# Patient Record
Sex: FEMALE | Race: WHITE | NOT HISPANIC OR LATINO | Employment: OTHER | URBAN - METROPOLITAN AREA
[De-identification: names, ages, dates, MRNs, and addresses within clinical notes are randomized per-mention and may not be internally consistent; named-entity substitution may affect disease eponyms.]

---

## 2017-05-22 ENCOUNTER — GENERIC CONVERSION - ENCOUNTER (OUTPATIENT)
Dept: OTHER | Facility: OTHER | Age: 67
End: 2017-05-22

## 2017-07-06 ENCOUNTER — ALLSCRIPTS OFFICE VISIT (OUTPATIENT)
Dept: OTHER | Facility: OTHER | Age: 67
End: 2017-07-06

## 2017-07-06 ENCOUNTER — TRANSCRIBE ORDERS (OUTPATIENT)
Dept: ADMINISTRATIVE | Facility: HOSPITAL | Age: 67
End: 2017-07-06

## 2017-07-06 DIAGNOSIS — R41.3 OTHER AMNESIA: ICD-10-CM

## 2017-07-06 DIAGNOSIS — R42 DIZZINESS AND GIDDINESS: ICD-10-CM

## 2017-07-06 DIAGNOSIS — I67.1 CEREBRAL ANEURYSM, NONRUPTURED: ICD-10-CM

## 2017-07-06 DIAGNOSIS — R42 DIZZINESS AND GIDDINESS: Primary | ICD-10-CM

## 2017-07-06 DIAGNOSIS — M54.2 CERVICALGIA: ICD-10-CM

## 2017-07-06 DIAGNOSIS — G43.019 COMMON MIGRAINE WITH INTRACTABLE MIGRAINE: Primary | ICD-10-CM

## 2017-07-06 DIAGNOSIS — R41.3 MEMORY LOSS: ICD-10-CM

## 2017-07-06 DIAGNOSIS — E55.9 VITAMIN D DEFICIENCY: ICD-10-CM

## 2017-07-06 DIAGNOSIS — G43.019 INTRACTABLE MIGRAINE WITHOUT AURA AND WITHOUT STATUS MIGRAINOSUS: ICD-10-CM

## 2017-07-11 ENCOUNTER — LAB CONVERSION - ENCOUNTER (OUTPATIENT)
Dept: OTHER | Facility: OTHER | Age: 67
End: 2017-07-11

## 2017-07-11 LAB
BUN SERPL-MCNC: 21 MG/DL (ref 7–25)
CREAT SERPL-MCNC: 0.82 MG/DL (ref 0.5–0.99)
EGFR AFRICAN AMERICAN (HISTORICAL): 88 ML/MIN/1.73M2
EGFR-AMERICAN CALC (HISTORICAL): 76 ML/MIN/1.73M2

## 2017-07-12 ENCOUNTER — LAB CONVERSION - ENCOUNTER (OUTPATIENT)
Dept: OTHER | Facility: OTHER | Age: 67
End: 2017-07-12

## 2017-07-12 LAB
25(OH)D3 SERPL-MCNC: 31 NG/ML (ref 30–100)
BUN SERPL-MCNC: 21 MG/DL (ref 7–25)
CREAT SERPL-MCNC: 0.82 MG/DL (ref 0.5–0.99)
EGFR AFRICAN AMERICAN (HISTORICAL): 88 ML/MIN/1.73M2
EGFR-AMERICAN CALC (HISTORICAL): 76 ML/MIN/1.73M2
HOMOCYST(E)INE, P/S (HISTORICAL): 10.4 UMOL/L
TSH SERPL DL<=0.05 MIU/L-ACNC: 3.18 MIU/L (ref 0.4–4.5)
VIT B12 SERPL-MCNC: 412 PG/ML (ref 200–1100)

## 2017-07-20 ENCOUNTER — HOSPITAL ENCOUNTER (OUTPATIENT)
Dept: MRI IMAGING | Facility: HOSPITAL | Age: 67
Discharge: HOME | End: 2017-07-20
Attending: PSYCHIATRY & NEUROLOGY
Payer: COMMERCIAL

## 2017-07-20 DIAGNOSIS — R41.3 OTHER AMNESIA: ICD-10-CM

## 2017-07-20 DIAGNOSIS — G43.019 INTRACTABLE MIGRAINE WITHOUT AURA AND WITHOUT STATUS MIGRAINOSUS: ICD-10-CM

## 2017-07-20 DIAGNOSIS — R42 DIZZINESS AND GIDDINESS: ICD-10-CM

## 2017-07-20 PROCEDURE — 70553 MRI BRAIN STEM W/O & W/DYE: CPT

## 2017-07-20 PROCEDURE — A9585 GADOBUTROL INJECTION: HCPCS | Performed by: PSYCHIATRY & NEUROLOGY

## 2017-07-20 RX ADMIN — GADOBUTROL 6 ML: 604.72 INJECTION INTRAVENOUS at 10:52

## 2017-07-31 ENCOUNTER — HOSPITAL ENCOUNTER (OUTPATIENT)
Dept: NEUROLOGY | Facility: AMBULATORY SURGERY CENTER | Age: 67
Discharge: HOME | End: 2017-07-31
Payer: COMMERCIAL

## 2017-07-31 DIAGNOSIS — R42 DIZZINESS AND GIDDINESS: ICD-10-CM

## 2017-07-31 PROCEDURE — 95816 EEG AWAKE AND DROWSY: CPT

## 2017-08-07 ENCOUNTER — HOSPITAL ENCOUNTER (OUTPATIENT)
Dept: CT IMAGING | Facility: HOSPITAL | Age: 67
Discharge: HOME | End: 2017-08-07
Attending: PSYCHIATRY & NEUROLOGY
Payer: COMMERCIAL

## 2017-08-07 DIAGNOSIS — I67.1 NONRUPTURED CEREBRAL ANEURYSM: ICD-10-CM

## 2017-08-07 PROCEDURE — 70496 CT ANGIOGRAPHY HEAD: CPT

## 2017-08-07 RX ADMIN — IODIXANOL 85 ML: 320 INJECTION, SOLUTION INTRAVASCULAR at 13:07

## 2017-09-26 ENCOUNTER — APPOINTMENT (OUTPATIENT)
Dept: PHYSICAL THERAPY | Facility: CLINIC | Age: 67
End: 2017-09-26
Payer: COMMERCIAL

## 2017-09-26 PROCEDURE — G8984 CARRY CURRENT STATUS: HCPCS

## 2017-09-26 PROCEDURE — 97110 THERAPEUTIC EXERCISES: CPT

## 2017-09-26 PROCEDURE — G8985 CARRY GOAL STATUS: HCPCS

## 2017-09-26 PROCEDURE — 97161 PT EVAL LOW COMPLEX 20 MIN: CPT

## 2017-09-28 ENCOUNTER — APPOINTMENT (OUTPATIENT)
Dept: PHYSICAL THERAPY | Facility: CLINIC | Age: 67
End: 2017-09-28
Payer: COMMERCIAL

## 2017-10-12 ENCOUNTER — TRANSCRIBE ORDERS (OUTPATIENT)
Dept: ADMINISTRATIVE | Facility: HOSPITAL | Age: 67
End: 2017-10-12

## 2017-10-12 ENCOUNTER — ALLSCRIPTS OFFICE VISIT (OUTPATIENT)
Dept: OTHER | Facility: OTHER | Age: 67
End: 2017-10-12

## 2017-10-12 DIAGNOSIS — M54.2 CERVICALGIA: ICD-10-CM

## 2017-10-12 DIAGNOSIS — M54.50 LOW BACK PAIN: ICD-10-CM

## 2017-10-12 DIAGNOSIS — M54.5 LOW BACK PAIN, UNSPECIFIED BACK PAIN LATERALITY, UNSPECIFIED CHRONICITY, WITH SCIATICA PRESENCE UNSPECIFIED: Primary | ICD-10-CM

## 2017-10-26 ENCOUNTER — HOSPITAL ENCOUNTER (OUTPATIENT)
Dept: MRI IMAGING | Facility: HOSPITAL | Age: 67
Discharge: HOME | End: 2017-10-26
Attending: PSYCHIATRY & NEUROLOGY
Payer: COMMERCIAL

## 2017-10-26 DIAGNOSIS — M54.2 CERVICALGIA: ICD-10-CM

## 2017-10-26 DIAGNOSIS — M54.5 LOW BACK PAIN, UNSPECIFIED BACK PAIN LATERALITY, UNSPECIFIED CHRONICITY, WITH SCIATICA PRESENCE UNSPECIFIED: ICD-10-CM

## 2017-10-26 PROCEDURE — 72148 MRI LUMBAR SPINE W/O DYE: CPT

## 2017-10-26 PROCEDURE — 72141 MRI NECK SPINE W/O DYE: CPT

## 2017-10-29 NOTE — PROGRESS NOTES
Assessment    1  Common migraine with intractable migraine (346 11) (G43 019)   2  Cervicalgia (723 1) (M54 2)   3  Lumbago (724 2) (M54 5)    Plan  Cervicalgia    · Divalproex Sodium  MG Oral Tablet Extended Release 24 Hour; TAKE 1TABLET AT BEDTIME   Rx By: Sabiha Akers; Dispense: 30 Days ; #:30 Tablet Extended Release 24 Hour; Refill: 1;For: Cervicalgia; MOSES = N; Verified Transmission to [facility] #[id number] Last Updated By: System, SureScripts; 10/12/2017 11:48:17 AM   · TiZANidine HCl - 2 MG Oral Tablet; Take 1 tablet at bedtime daily   Rx By: Sabiha Akers; Dispense: 30 Days ; #:30 Tablet; Refill: 1;Cervicalgia; MOSES = N; Verified Transmission to [facility]ARM[facility]Y #[id number] Last Updated By: System, SureScripts; 10/12/2017 11:48:18 AM   · * MRI CERVICAL SPINE WO CONTRAST; Status:Need Information - FinancialAuthorization; Requested for:12Oct2017;    Perform:[facility] Radiology; Order Comments:has completed pt with no benefit; Due:12Oct2018; Last Updated By:Leia Lema; 10/12/2017 12:04:42 PM;Ordered;Ordered By:Malik, Gerome Moritz;  Cervicalgia, Common migraine with intractable migraine    · Follow-up visit in 5 weeks Evaluation and Treatment  Follow-up with [facility] or blaze Status: Complete  Done: [id number]   Ordered;Cervicalgia, Common migraine with intractable migraine; Ordered By: Sabiha Akers Performed:  Due: 12Oct2018; Last Updated By: Angelyn Severs; 10/12/2017 12:10:17 PM  Common migraine with intractable migraine    · Rizatriptan Benzoate 5 MG Oral Tablet; TAKE 1 TABLET AT ONSET OF HEADACHE  MAY REPEAT EVERY 2 HOURS AS NEEDED  MAXIMUM 3 TABLETS IN 24 HOURS   Rx By: Sabiha Akers; Dispense: 0 Days ; #:9 Tablet; Refill: 1;Common migraine with intractable migraine; MOSES = N; Verified Transmission to [facility]/PHARM[facility]Y #[id number] Last Updated By: System, SureScripts; 10/12/2017 11:48:17 AM  Lumbago    · * MRI LUMBAR SPINE WO CONTRAST; Status:Need Information - FinancialAuthorization;  Requested for:12Oct2017;    Perform:[facility] Radiology; [id number]; Last Updated By:Leia Lema; 10/12/2017 12:09:05 PM;Ordered;Ordered By:Marquita Aguilar;    Discussion/Summary  Discussion Summary:   Preventive: Will have her take Depakote Er 250 mg at bedtime daily for her migraine headaches  consider increasing if needed  For her neck pain she is to take tizanidine 2mg at bedtime as needed and continue her neck exercises  onset she is to take Maxalt 5mg and if that fails then Compazine  is also to take b12 901GSV pre day and Folic acid 233OUE per day  get MRI of neck and lumbar region due to pain and diffuse hyperreflexia  Chief Complaint  Chief Complaint Free Text Note Form: Patient present for follow up for headaches      History of Present Illness  HPI: We had the pleasure of evaluating Sage Desir in neurological follow up today  As you know she is a 61year old right handed female  She is a  and retired in Dec of 2016  She is here today for evaluation of her headaches  She did go the PT and is doing exercises on her own which do not help  She did try the muscle relaxation and that did not help either  Continues to have neck pain and has decrease range of motion to the left  She has noted that when her back pain is severe, she will have more neck pain with this  She states she has not had this for a while but states it is more so when she gets up in the morning  She is able to function with this but it will last a day to half a day  Her main concern is that lately when she was standing, she gets this feeling that she is going to pass out  She has noted she will have tachycardia with this  Will last for seconds and no other symptoms related to this  She is getting this once a week or once every other week now      headaches: medications do you take or have you taken for your headaches?  Inderalfiorinal, Excedrin, Advil, Aspirin  Headache trigger: Fatigue, Stress/Tension, Weather change, red winenone  often do the headaches occur ? one severe migraine in july she took Imitrex and it helped but caused lethargy and palpitation  time of the day do the headaches start ? varieslong do the headaches last - it can last for 48 hoursare they located ? temporal and at time entire one side of her headis the intensity of pain ? 8-9/10your usual headache - Throbbing, pressure, Stabbingsymptoms: Decrease of appetite, nauseaPhotophobia, phonophobia, sensitivity to smell Problem with concentrationstiff or sore neck, prefer to be alone and in a dark room, unable to work      reviewed      Review of Systems  Neurological ROS:  Constitutional: recent weight loss-- and-- fatigue  HEENT: dryness of the eyes,-- hearing loss-- and-- tinnitus  Cardiovascular: palpitations present   Respiratory:  no unusual or persistant cough, no shortness of breath with or without exertion  Gastrointestinal: changes in bowel habits  Genitourinary:  no incontinence, no feelings of urinary urgency, no increase in frequency, no urinary hesitancy, no dysuria, no hematuria  Musculoskeletal: head/neck/back pain  Integumentary  no masses, no rash, no skin lesions, no livedo reticularis  Psychiatric: anxiety  Endocrine  no unusual weight loss or gain, no excessive urination, no excessive thirst, no hair loss or gain, no hot or cold intolerance, no menstrual period change or irregularity, no loss of sexual ability or drive, no erection difficulty, no nipple discharge  Hematologic/Lymphatic:  no unusual bleeding, no tendency for easy bruising, no clotting skin or lumps  Neurological General: headache-- and-- lightheadedness  Neurological Mental Status:  no confusion, no mood swings, no alteration or loss of consciousness, no difficulty expressing/understanding speech, no memory problems  Neurological Cranial Nerves: vertigo or dizziness  Neurological Motor findings include:  no tremor, no twitching, no cramping(pre/post exercise), no atrophy    Neurological Coordination:  no unsteadiness, no vertigo or dizziness, no clumsiness, no problems reaching for objects  Neurological Sensory:  no numbness, no pain, no tingling, does not fall when eyes closed or taking a shower  Neurological Gait:  no difficulty walking, not falling to one side, no sensation of being pushed, has not had falls  ROS Reviewed:   ROS reviewed  Active Problems    1  Acute right-sided low back pain without sciatica (724 2) (M54 5)   2  Aneurysm, cerebral (437 3) (I67 1)   3  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)   4  Cervicalgia (723 1) (M54 2)   5  Common migraine with intractable migraine (346 11) (G43 019)   6  Decreased visual acuity (369 9) (H54 7)   7  Dermatitis (692 9) (L30 9)   8  Dizzinesses (780 4) (R42)   9  Encounter for screening colonoscopy (V76 51) (Z12 11)   10  Encounter for screening mammogram for malignant neoplasm of breast (V76 12)  (Z12 31)   11  Hearing Loss (389 9)   12  Herniated Disc (T8 - T9) Bilateral (722 11)   13  History of allergy (V15 09) (Z88 9)   14  Hyperlipidemia (272 4) (E78 5)   15  Irritable bowel syndrome (564 1) (K58 9)   16  Left knee pain (719 46) (M25 562)   17  Lightheadedness (780 4) (R42)   18  Memory difficulties (780 93) (R41 3)   19  Migraine (346 90) (G43 909)   20  Onychomycosis (110 1) (B35 1)   21  Osteoarthritis of both hands, unspecified osteoarthritis type (715 94) (M19 041,M19 042)   22  Screening for cervical cancer (V76 2) (Z12 4)   23  Skin lesions (709 9) (L98 9)   24  URI, acute (465 9) (J06 9)   25  Well adult on routine health check (V70 0) (Z00 00)    Past Medical History  1  History of Abdominal cramping (789 00) (R10 9)   2  History of Acute bacterial conjunctivitis (372 03) (H10 30)   3  History of Carpal tunnel syndrome, unspecified laterality (354 0) (G56 00)   4  History of Cough (786 2) (R05)   5  Flu vaccine need (V04 81) (Z23)   6  History of acute bronchitis (V12 69) (Z87 09)   7   History of acute sinusitis (V12 69) (Z87 09)   8  History of chest pain (V13 89) (Z87 898)   9  History of hysterectomy (V88 01) (Z90 710)   10  History of upper respiratory infection (V12 09) (Z87 09)   11  History of Joint pain, knee (719 46) (M25 569)   12  History of Leg swelling (729 81) (M79 89)   13  History of Palpitations (785 1) (R00 2)   14  History of Rectal hemorrhage (569 3) (K62 5)   15  History of Sore throat (462) (J02 9)    Surgical History  1  History of Appendectomy   2  History of Complete Colonoscopy   3  History of Total Abdominal Hysterectomy With Removal Of Both Ovaries    Family History  Mother    1  Family history of Heart Disease (V17 49)  Father    2  Family history of Lung Cancer (V16 1)  Sibling    3  Family history of multiple sclerosis (V17 2) (Z82 0)    Social History     · Alcohol Use (History)   · Caffeine use (V49 89) (F15 90)   · Former smoker (D39 25) (T27 915)   ·    · Retired   · Three children    Current Meds   1  Atorvastatin Calcium 40 MG Oral Tablet; TAKE 1 TABLET DAILY; Therapy: 44PAY5736 to (455 14 549)  Requested for: 23PKY5556; Last Rx:65Vin7303 Ordered   2  Butalbital-Aspirin-Caffeine -40 MG Oral Capsule; TAKE 1 CAPSULE Daily PRN migraine; Therapy: 79WLM0571 to (Evaluate:97Hfs1814)  Requested for: 31RDS9066; Last Rx:39Zdy5067 Ordered   3  LORazepam 0 5 MG Oral Tablet; 1 tablet po 1 hour prior to MRI and 1 tablet at time of MRI if needed; Therapy: 94WNR0199 to (Last Rx:30Ljf7085) Ordered   4  Prochlorperazine Maleate 10 MG Oral Tablet; 1 PO Q 6H PRN NAUSEA/HEADACHE (MAX 3 DAYS/WK); Therapy: 48VBW1280 to (Last Rx:32Hrm5067)  Requested for: 04IHG4647 Ordered   5  Propranolol HCl  MG Oral Capsule Extended Release 24 Hour; TAKE 1 CAPSULE EVERY DAY; Therapy: 05HLN4138 to (01 84 17 61 18)  Requested for: 16YMI4428; Last Rx:70Amf5863 Ordered   6  SUMAtriptan Succinate 100 MG Oral Tablet; TAKE 1 TABLET AT ONSET OF MIGRAINE HEADACHE    MAY REPEAT IN 2 HOURS IF NEEDED; Therapy: 47YZY6165 to (Last Rx:76Qyy4619)  Requested for: 98UUU5146 Ordered    Allergies    1  No Known Drug Allergies    Vitals  Signs   Recorded: 87WBF4051 11:17AM   Heart Rate: 65  Systolic: 239  Diastolic: 66  Height: 5 ft 7 in  Weight: 147 lb 8 oz  BMI Calculated: 23 1  BSA Calculated: 1 78    Physical Exam   Constitutional  General appearance: No acute distress, well appearing and well nourished  Eyes  Ophthalmoscopic examination: Vision is grossly normal  Gross visual field testing by confrontation shows no abnormalities  EOMI in both eyes  Conjunctivae clear  Eyelids normal palpebral fissures equal  Orbits exhibit normal position  No discharge from the eyes  PERRL  Musculoskeletal  Gait and station: Normal gait, stance and balance  Muscle strength: Normal strength throughout  Muscle tone: No atrophy, abnormal movements, flaccidity, cogwheeling or spasticity  Neurologic  Orientation to person, place, and time: Normal    2nd cranial nerve: Normal    3rd, 4th, and 6th cranial nerves: Normal    5th cranial nerve: Normal    7th cranial nerve: Normal    8th cranial nerve: Normal    9th cranial nerve: Normal    11th cranial nerve: Normal    12th cranial nerve: Normal    Sensation: Normal    Reflexes: Abnormal   Deep tendon reflexes: 3+ right biceps,-- 3+ left biceps,-- 3+ right triceps,-- 3+ left triceps,-- 3+ right brachioradialis,-- 3+ left brachioradialis,-- 3+ right patella,-- 3+ left patella,-- 2 beats ankle clonus on the right-- and-- 2 beats ankle clonus on the left2+ right ankle jerk-- and-- 2+ left ankle jerk    Coordination: Normal    Mood and affect: Normal        Future Appointments    Date/Time Provider Specialty Site   11/15/2017 10:45 AM [facility] Neurology [facility] [place] [address]       Signatures   Electronically signed by : Valentino Anderson MD; Oct 12 2017  3:35PM EST                       (Author)

## 2017-11-15 ENCOUNTER — TRANSCRIBE ORDERS (OUTPATIENT)
Dept: ADMINISTRATIVE | Facility: HOSPITAL | Age: 67
End: 2017-11-15

## 2017-11-15 ENCOUNTER — HOSPITAL ENCOUNTER (OUTPATIENT)
Dept: NON INVASIVE DIAGNOSTICS | Facility: HOSPITAL | Age: 67
Discharge: HOME | End: 2017-11-15
Payer: COMMERCIAL

## 2017-11-15 ENCOUNTER — GENERIC CONVERSION - ENCOUNTER (OUTPATIENT)
Dept: OTHER | Facility: OTHER | Age: 67
End: 2017-11-15

## 2017-11-15 DIAGNOSIS — R00.2 PALPITATIONS: ICD-10-CM

## 2017-11-15 DIAGNOSIS — R00.2 PALPITATIONS: Primary | ICD-10-CM

## 2017-11-15 PROCEDURE — 93005 ELECTROCARDIOGRAM TRACING: CPT

## 2017-11-19 LAB
ATRIAL RATE: 52 BPM
P AXIS: 40 DEGREES
PR INTERVAL: 152 MS
QRS AXIS: 58 DEGREES
QRSD INTERVAL: 88 MS
QT INTERVAL: 424 MS
QTC INTERVAL: 394 MS
T WAVE AXIS: 38 DEGREES
VENTRICULAR RATE: 52 BPM

## 2017-12-01 ENCOUNTER — ALLSCRIPTS OFFICE VISIT (OUTPATIENT)
Dept: OTHER | Facility: OTHER | Age: 67
End: 2017-12-01

## 2017-12-01 DIAGNOSIS — R00.2 PALPITATIONS: ICD-10-CM

## 2017-12-01 DIAGNOSIS — R42 DIZZINESS AND GIDDINESS: ICD-10-CM

## 2017-12-01 DIAGNOSIS — R94.31 ABNORMAL ELECTROCARDIOGRAM: ICD-10-CM

## 2017-12-01 DIAGNOSIS — S29.019A STRAIN OF MUSCLE AND TENDON OF UNSPECIFIED WALL OF THORAX, INITIAL ENCOUNTER: ICD-10-CM

## 2017-12-05 NOTE — CONSULTS
Assessment  Assessed    1  Thoracic myofascial strain (847 1) (S29 019A)    Plan  Thoracic myofascial strain    · *1 - [facility]  *please eval and treat for thoracic myofascialpain 1-2x/wk for 4-6 weeks and progess to HEP  Status: Active  Requested for:06Dbo0451   Ordered; Thoracic myofascial strain; Ordered By: Sai Zhong Performed:  Due: 03LXE4181  Care Summary provided  : Yes   · Follow-up visit in 8 weeks Evaluation and Treatment  Follow-up with JE  Status: Hold For- Scheduling  Requested for: 58UFO6859   Ordered; Thoracic myofascial strain; Ordered By: Sai Zhong Performed:  Due: 49PYV4375    Discussion/Summary    1  Recommend increasing the dose of her tizanidine, she can take 2 tablets at night for the next 4-5 days to see if that improves her symptoms and then trial this during the daytime as well  She may contact my office for refills if necessary  We will initiate physical therapy for her  She is describing some myofascial pain follow-up standing for long periods of time  I believe she rehabs these muscles she will have some improvement in her symptoms  follow up in 8 weeks  Chief Complaint  Chief Complaints    1  Back Pain  thoracic myofascial pain      History of Present Illness  Ms Ayse Martins this is a very pleasant 79-year-old female sent from her Neurology team for further evaluation and management of her thoracic back pain complaints  She has been experiencing this for number of years which is worsening recently and rated as moderate to severe intensity pain score is a 69/10  This is constant without any typical pattern  She characterizes the pain as shooting, dull, aching, and cutting  factors include prior, standing, exercise  Alleviating factors include sitting and relaxation  She has had MRIs of the cervical and lumbar spine  She does have some significant scoliosis but no nerve root compression    has noted some moderate relief local heat or ice application  No relief with physical therapy for the neck in the past, no exercise   have personally reviewed and/or updated the patient's past medical history, past surgical history, family history, social history, current medications, allergies, and vital signs today  Referring physician is   [facility] physician is  DR Dhara Hassan presents with complaints of constant episodes of bilateral lower and bilateral mid back pain, described as dull and aching  On a scale of 1 to 10, the patient rates the pain as 4  Review of Systems   Constitutional: no fever,-- no recent weight gain-- and-- no recent weight loss  Eyes: no double vision-- and-- no blurry vision  Cardiovascular: palpitations, but-- no chest pain-- and-- no lower extremity edema  Respiratory: no complaints of shortness of breath-- and-- no wheezing  Musculoskeletal: muscle weakness,-- joint stiffness-- and-- decreased range of motion, but-- no difficulty walking,-- no joint swelling,-- no limb swelling-- and-- no pain in extremity  Neurological: dizziness, but-- no difficulty swallowing,-- no memory loss,-- no loss of consciousness-- and-- no seizures  Gastrointestinal: diarrhea, but-- no nausea-- and-- no vomiting  Genitourinary: no difficulty initiating urine stream,-- no genital pain-- and-- no frequent urination  Integumentary: no complaints of skin rash  Psychiatric: no depression  Endocrine: no excessive thirst,-- no adrenal disease,-- no hypothyroidism-- and-- no hyperthyroidism  Hematologic/Lymphatic: no tendency for easy bruising-- and-- no tendency for easy bleeding  Active Problems  Problems    1  Abnormal EKG (794 31) (R94 31)   2  Acute right-sided low back pain without sciatica (724 2) (M54 5)   3  Aneurysm, cerebral (437 3) (I67 1)   4  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)   5  Cervicalgia (723 1) (M54 2)   6  Chronic migraine (346 70) (G43 709)   7   Common migraine with intractable migraine (346 11) (G43 019)   8  Decreased visual acuity (369 9) (H54 7)   9  Dermatitis (692 9) (L30 9)   10  Dizzinesses (780 4) (R42)   11  Encounter for screening colonoscopy (V76 51) (Z12 11)   12  Encounter for screening mammogram for malignant neoplasm of breast (V76 12)  (Z12 31)   13  Hearing Loss (389 9)   14  Herniated Disc (T8 - T9) Bilateral (722 11)   15  History of allergy (V15 09) (Z88 9)   16  Hyperlipidemia (272 4) (E78 5)   17  Irritable bowel syndrome (564 1) (K58 9)   18  Left knee pain (719 46) (M25 562)   19  Levoscoliosis (737 39) (M41 80)   20  Lightheadedness (780 4) (R42)   21  Lumbago (724 2) (M54 5)   22  Memory difficulties (780 93) (R41 3)   23  Onychomycosis (110 1) (B35 1)   24  Osteoarthritis of both hands, unspecified osteoarthritis type (715 94) (M19 041,M19 042)   25  Palpitations (785 1) (R00 2)   26  Screening for cervical cancer (V76 2) (Z12 4)   27  Skin lesions (709 9) (L98 9)   28  URI, acute (465 9) (J06 9)   29  Well adult on routine health check (V70 0) (Z00 00)    Past Medical History  Problems    1  History of Abdominal cramping (789 00) (R10 9)   2  History of Acute bacterial conjunctivitis (372 03) (H10 30)   3  History of Carpal tunnel syndrome, unspecified laterality (354 0) (G56 00)   4  History of Cough (786 2) (R05)   5  Flu vaccine need (V04 81) (Z23)   6  History of acute bronchitis (V12 69) (Z87 09)   7  History of acute sinusitis (V12 69) (Z87 09)   8  History of chest pain (V13 89) (Z87 898)   9  History of hysterectomy (V88 01) (Z90 710)   10  History of upper respiratory infection (V12 09) (Z87 09)   11  History of Joint pain, knee (719 46) (M25 569)   12  History of Leg swelling (729 81) (M79 89)   13  History of Rectal hemorrhage (569 3) (K62 5)   14  History of Sore throat (462) (J02 9)    Surgical History  Problems    1  History of Appendectomy   2  History of Complete Colonoscopy   3   History of Total Abdominal Hysterectomy With Removal Of Both Ovaries    Family History  Mother    1  Family history of Heart Disease (V17 49)  Father    2  Family history of Lung Cancer (V16 1)  Sibling    3  Family history of multiple sclerosis (V17 2) (Z82 0)    Social History  Problems    · Alcohol Use (History)   · Caffeine use (V49 89) (F15 90)   · Former smoker (X53 07) (U88 329)   ·    · Retired   · Three children    Current Meds   1  Aspirin TABS; Therapy: (Recorded:35Xld5480) to Recorded   2  Atorvastatin Calcium 40 MG Oral Tablet; TAKE 1 TABLET DAILY; Therapy: 92XOJ6970 to (22 753677)  Requested for: 51ERA8450; Last Rx:31Ilt0284 Ordered   3  Propranolol HCl  MG Oral Capsule Extended Release 24 Hour; TAKE 1 CAPSULE EVERY DAY; Therapy: 09BOK3092 to (Bharati Curry)  Requested for: 05UUO3961; Last Rx:86Mdd8509 Ordered   4  SUMAtriptan Succinate 100 MG Oral Tablet; TAKE 1 TABLET AT ONSET OF MIGRAINE HEADACHE  MAY REPEAT IN 2 HOURS IF NEEDED; Therapy: 23QWY5006 to (Last Rx:78Evg2173)  Requested for: 56KWJ1372 Ordered   5  TiZANidine HCl - 2 MG Oral Tablet; Take 1 tablet at bedtime daily; Therapy: 97ULE3043 to (Evaluate:81Jbj9683)  Requested for: 60NVF0553; Last Rx:48Gre3288 Ordered    Allergies  Medication    1  No Known Drug Allergies    Vitals  Vital Signs    Recorded: 36IYL4429 10:05AM   Temperature 34 5 F   Systolic 951   Diastolic 60   Weight 196 lb    BMI Calculated 23 49   BSA Calculated 1 79   Pain Scale 4       Physical Exam   Constitutional  General appearance: Well developed, well nourished, alert, in no distress, non-toxic and no overt pain behavior  Eyes  Sclera: anicteric  HEENT  Hearing grossly intact  Pulmonary  Respiratory effort: Even and unlabored  Cardiovascular  Examination of extremities: No edema or pitting edema present  Skin  Skin and subcutaneous tissue: Normal without rashes or lesions, well hydrated  Psychiatric  Mood and affect: Mood and affect appropriate     Neurologic  Cranial nerves: Cranial nerves II-XII grossly intact  Musculoskeletal  Gait and station: Normal    Thoracic Spine examination demonstrates Thoracic Spine:  Tenderness:  thoracic spine tenderness-- and-- right paraspinal tenderness  Results/Data  Procedure Flowsheet 07REI1961 10:07AM      Test Name Result Flag Reference   Oswestry Score 52         Results    I personally reviewed the films/images in the office today  * MRI CERVICAL SPINE WO CONTRAST 26Oct2017 12:21PM Chad Harris     Test Name Result Flag Reference   MRI CERVICAL SPINE [address] (Report)       MRI CERVICAL SPINE WITHOUT CONTRAST   INDICATION: M54 2: Cervicalgia  History taken directly from the electronic ordering system  Worsening neck pain for years worsening over the last 6 months  History of physical therapy which did not alleviate symptoms  COMPARISON: None  TECHNIQUE: Sagittal T1, sagittal T2, sagittal inversion recovery, axial T2, axial 2D merge      IMAGE QUALITY: Diagnostic   FINDINGS:   ALIGNMENT: There is reversal of the cervical lordosis at the C5 segment  No subluxation  MARROW SIGNAL: Scattered degenerative endplate changes  No focally suspicious marrow lesions  No bone marrow edema or compression abnormality  CERVICAL AND VISUALIZED THORACIC CORD: Normal signal within the visualized cord  PREVERTEBRAL AND PARASPINAL SOFT TISSUES:  Normal        VISUALIZED POSTERIOR FOSSA: The visualized posterior fossa demonstrates no abnormal signal    CERVICAL DISC SPACES:      C2-C3: There is no focal disk herniation, central canal or neural foraminal narrowing  Bilateral facet hypertrophy noted  C3-C4: There is a diffuse disk bulge  No significant central canal or neural foraminal narrowing  Bilateral facet hypertrophy noted  C4-C5: There is a diffuse disk bulge  No significant central canal or neural foraminal narrowing  Bilateral facet hypertrophy noted      C5-C6: There is loss of disc height and signal  There is a disc osteophyte complex  There is mild tricompartmental narrowing  C6-C7: There is a disc osteophyte complex  There is mild bilateral neural foraminal narrowing  Central canal patent  C7-T1: Normal    UPPER THORACIC DISC SPACES: T1-T2, T2-T3 and T3-T4: Tiny central/left paracentral disc extrusions with minimal central canal narrowing  Neural foramina bilaterally patent    IMPRESSION:   Mild reversal of the cervical lordosis centered at the C5 level with mild spondylotic changes  No cord compression or cord signal abnormality  Workstation performed: [id number]   Signed by: Raeann Manzano MD  10/27/17     * MRI LUMBAR SPINE WO CONTRAST 26Oct2017 12:20PM Sin Downing     Test Name Result Flag Reference   MRI LUMBAR SPINE WO CONTRAST (Report)       MRI LUMBAR SPINE WITHOUT CONTRAST   INDICATION: M54 5: Low back pain  History taken directly from the electronic ordering system  Pain for years, increasing over the last 6 months  COMPARISON: 8/23/2007   TECHNIQUE: Sagittal T1, sagittal T2, sagittal inversion recovery, axial T1 and axial T2, coronal T2     IMAGE QUALITY: Diagnostic   FINDINGS:   ALIGNMENT: Severe levoscoliosis thoracolumbar junction  No subluxation  Spinal alignment is similar to the prior MRI  MARROW SIGNAL: Scattered degenerative endplate changes  No focally suspicious marrow lesions  No bone marrow edema or compression abnormality  DISTAL CORD AND CONUS: Normal size and signal within the distal cord and conus  The conus ends at the L1 level  PARASPINAL SOFT TISSUES: Paraspinal soft tissues are unremarkable  SACRUM: Normal signal within the sacrum  No evidence of insufficiency or stress fracture  LOWER THORACIC DISC SPACES: T10-T11, T11-T12: There is no focal disk herniation, central canal or neural foraminal narrowing  T12-L1: Small central disc herniation, protrusion type  Minimal central canal narrowing  Neural foramina bilaterally patent     LUMBAR DISC SPACES:      L1-L2: There is no focal disk herniation, central canal or neural foraminal narrowing  Bilateral facet hypertrophy noted  L2-L3: There is a right neural foraminal disc herniation, protrusion type  Moderate right neural foraminal narrowing  Bilateral facet hypertrophy noted  Severe right neural foraminal narrowing  Central canal and left neural foramen patent  L3-L4: There is bilateral facet hypertrophy  There is a diffuse disc bulge  There is mild tricompartmental narrowing  L4-L5: There is a left neural foraminal disc herniation, protrusion type  There is moderate to severe left neural foraminal narrowing  Central canal and right neural foramen patent  L5-S1: There is a diffuse disk bulge  No significant central canal or neural foraminal narrowing  Bilateral facet hypertrophy noted  IMPRESSION:   Severe levoscoliosis and scattered spondylosis most pronounced at L4-L5, eccentrically on the left  Spondylotic changes on the right at L2-3 are also relatively advanced  Workstation performed: [id number]   Signed by: Heather Ramey MD  10/27/17     Future Appointments    Date/Time Provider Specialty Site   12/06/2017 01:00 PM Deven Gaines DO Cardiology  [facility]   01/02/2018 11:30 AM Sabra Hairston [facility] Neurology [facility]  [place]   01/03/2018 01:20 PM LEONOR Chávez   Orthopedic Surgery [facility]       Signatures   Electronically signed by : Gerson Longoria DO; Dec  1 2017 10:25AM EST                       (Author)

## 2017-12-06 ENCOUNTER — ALLSCRIPTS OFFICE VISIT (OUTPATIENT)
Dept: OTHER | Facility: OTHER | Age: 67
End: 2017-12-06

## 2017-12-06 ENCOUNTER — TRANSCRIBE ORDERS (OUTPATIENT)
Dept: ADMINISTRATIVE | Facility: HOSPITAL | Age: 67
End: 2017-12-06

## 2017-12-06 DIAGNOSIS — E78.5 HYPERLIPIDEMIA, UNSPECIFIED HYPERLIPIDEMIA TYPE: ICD-10-CM

## 2017-12-06 DIAGNOSIS — R94.31 NONSPECIFIC ABNORMAL ELECTROCARDIOGRAM (ECG) (EKG): Primary | ICD-10-CM

## 2017-12-07 NOTE — CONSULTS
Assessment    1  Dizzinesses (780 4) (R42)   2  Palpitations (785 1) (R00 2)   3  Abnormal EKG (794 31) (R94 31)    Plan  Abnormal EKG, Dizzinesses, Palpitations    · ECHO COMPLETE WITH CONTRAST IF INDICATED; Status:Need Information - FinancialAuthorization; Requested for:29Apg5485 08:00AM;    Perform:[facility]; Due:26Dec2018; Last Updated By:Dania Nicolas; 12/6/2017 1:43:04 PM;Ordered;EKG, Dizzinesses, Palpitations; Ordered By:Josue Pham 60;   · HOLTER MONITOR - 48 HOUR; Status:Hold For - Scheduling; Requestedfor:06Dec2017;    Perform:[facility]; Due:06Dec2018; Ordered; For:Abnormal EKG, Dizzinesses, Palpitations; Ordered By:Jaron Pham;  Palpitations    · EKG/ECG- POC; Status:Complete;   Done: 60TED6798 01:15PM   Perform: In Office; Last Updated By:Luisa Romero; 12/6/2017 1:20:26 PM;Ordered;Ordered By:Josue Pham 60;    Discussion/Summary    1  palpitations at times worse with imitrex, dizziness fatigue  hyperlipidemia on atorvastatin (, HDL 88,  10/2016)    Will obtain 48h holter and 2d echocardiogram to correlate symptoms with arrhythmia or premature complexes  Will follow up with patient after studies  (has history of normal LVEF/structurally normal heart 2012)  History of Present Illness  Cardiology HPI Free Text Note Form [facility]: Patient presents for consultation regarding palpitations and reported âabnormal EKG  patient has been on chronic therapy for migraine headaches  She has been experiencing off and on palpitations which she thought was a bit worse after taking Imitrex  neurologist obtain an ECG and ultimately referred here due to the above  shows sinus bradycardia as she has been on propanolol now for a few decades for her migraine headaches  has had stress test in 2012 [facility] with the [facility] [address] [facility] [employer] group which showed normal LV function  She had false positive ECG finding for that consultation  has had no sexual chest discomfort  She is mostly bothered by her migraines and her chronic thoracic back pain due to scoliosis  is taking slew of medications for this including muscle relaxants which caused fatigue  denies any lightheadedness  She has had occasional dizziness  She states that she does get dizzy with prolonged standing at 1 point several years ago flush was going to pass out but she did not   has no history of high blood pressure  does not have a history of hyperlipidemia and is on atorvastatin 40 mg daily  No premature coronary artery disease in her family  She states her mother had [place] in her late 66's  Review of Systems     Cardiac: rhythm problems-- and-- palpitations present , but-- no chest pain  Skin: No complaints of nonhealing sores or skin rash  Genitourinary: No complaints of recurrent urinary tract infections, frequent urination at night, difficult urination, blood in urine, kidney stones, loss of bladder control, kidney problems, denies any birth control or hormone replacement, is not post menopausal, not currently pregnant  Psychological: depression,-- anxiety-- and-- palpitations present , but-- no panic attacks  General: lack of energy/fatigue, but-- no trouble sleeping  Respiratory: shortness of breath, but-- no cough/sputum  HEENT: No complaints of serious problems, hearing problems, nose problems, throat problems, or snoring  Gastrointestinal: heartburn-- and-- bloody stools, but-- no nausea,-- no vomiting,-- no diarrhea,-- no abdonimal pain-- and-- no rectal bleeding  Hematologic: No complaints of bleeding disorders, anemia, blood clots, or excessive brusing  Neurological: weakness-- and-- headaches, but-- no dizziness-- migraine headaches  Active Problems    1  Abnormal EKG (794 31) (R94 31)   2  Acute right-sided low back pain without sciatica (724 2) (M54 5)   3  Aneurysm, cerebral (437 3) (I67 1)   4  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)   5   Cervicalgia (723 1) (M54 2) 6  Chronic migraine (346 70) (G43 709)   7  Common migraine with intractable migraine (346 11) (G43 019)   8  Decreased visual acuity (369 9) (H54 7)   9  Dermatitis (692 9) (L30 9)   10  Dizzinesses (780 4) (R42)   11  Encounter for screening colonoscopy (V76 51) (Z12 11)   12  Encounter for screening mammogram for malignant neoplasm of breast (V76 12)  (Z12 31)   13  Hearing Loss (389 9)   14  Herniated Disc (T8 - T9) Bilateral (722 11)   15  History of allergy (V15 09) (Z88 9)   16  Hyperlipidemia (272 4) (E78 5)   17  Irritable bowel syndrome (564 1) (K58 9)   18  Left knee pain (719 46) (M25 562)   19  Levoscoliosis (737 39) (M41 80)   20  Lightheadedness (780 4) (R42)   21  Lumbago (724 2) (M54 5)   22  Memory difficulties (780 93) (R41 3)   23  Onychomycosis (110 1) (B35 1)   24  Osteoarthritis of both hands, unspecified osteoarthritis type (715 94) (M19 041,M19 042)   25  Palpitations (785 1) (R00 2)   26  Screening for cervical cancer (V76 2) (Z12 4)   27  Skin lesions (709 9) (L98 9)   28  Thoracic myofascial strain (847 1) (S29 019A)   29  URI, acute (465 9) (J06 9)   30   Well adult on routine health check (V70 0) (Z00 00)    Past Medical History   · History of Abdominal cramping (789 00) (R10 9)   · History of Acute bacterial conjunctivitis (372 03) (H10 30)   · History of Carpal tunnel syndrome, unspecified laterality (354 0) (G56 00)   · History of Cough (786 2) (R05)   · Flu vaccine need (V04 81) (Z23)   · History of acute bronchitis (V12 69) (Z87 09)   · History of acute sinusitis (V12 69) (Z87 09)   · History of chest pain (V13 89) (G40 966)   · History of hysterectomy (V88 01) (Z90 710)   · History of upper respiratory infection (V12 09) (Z87 09)   · History of Joint pain, knee (719 46) (M25 569)   · History of Leg swelling (729 81) (M79 89)   · History of Migraine (346 90) (G43 909)   · History of Rectal hemorrhage (569 3) (K62 5)   · History of Sore throat (462) (J02 9)    The active problems and past medical history were reviewed and updated today  Surgical History   · History of Appendectomy   · History of Complete Colonoscopy   · History of Total Abdominal Hysterectomy With Removal Of Both Ovaries    The surgical history was reviewed and updated today  Family History  Mother    · Family history of Heart Disease (V17 49)  Father    · Family history of Lung Cancer (V16 1)  Sibling    · Family history of multiple sclerosis (V17 2) (Z82 0)  Family History Reviewed: The family history was reviewed and updated today  Social History     · Alcohol Use (History)   · 2-3 BEERS WEEKLY   · Caffeine use (V49 89) (F15 90)   · Former smoker (O24 97) (T28 729)   · [address] [place] [address] - smoked about 1 ppd for about 20 years intermittently   ·    · Retired   · Three children  The social history was reviewed and updated today  Current Meds   1  Aspirin TABS; Therapy: (Recorded:07Xxr0867) to Recorded   2  Atorvastatin Calcium 40 MG Oral Tablet; TAKE 1 TABLET DAILY; Therapy: 08VHP7990 to (003 4763)  Requested for: 84MXX6163; Last Rx:34Rou5923 Ordered   3  Propranolol HCl  MG Oral Capsule Extended Release 24 Hour; TAKE 1 CAPSULE EVERY DAY; Therapy: 43LRF3992 to (467 20 767)  Requested for: 56UHO6439; Last Rx:69Ivc3877 Ordered   4  SUMAtriptan Succinate 100 MG Oral Tablet; TAKE 1 TABLET AT ONSET OF MIGRAINE HEADACHE  MAY REPEAT IN 2 HOURS IF NEEDED; Therapy: 28VEN9557 to (Last Rx:69Qkf3330)  Requested for: 53NRC2600 Ordered   5  TiZANidine HCl - 2 MG Oral Tablet; Take 1 tablet at bedtime daily; Therapy: 77DNV7205 to (Evaluate:47Udq1417)  Requested for: 11SGZ6080; Last Rx:51Upp4404 Ordered    The medication list was reviewed and updated today  Allergies  1   No Known Drug Allergies    Vitals  Signs     Heart Rate: 56  Respiration: 14  Systolic: 031  Diastolic: 64  Height: 5 ft 7 in  Weight: 146 lb   BMI Calculated: 22 87  BSA Calculated: 1 77    Physical Exam   Constitutional General appearance: No acute distress, well appearing and well nourished  Eyes  Conjunctiva and Sclera examination: Conjunctiva pink, sclera anicteric  Ears, Nose, Mouth, and Throat - Oropharynx: Clear, nares are clear, mucous membranes are moist   Neck  Neck and thyroid: Normal, supple, trachea midline, no thyromegaly  Pulmonary  Respiratory effort: No increased work of breathing or signs of respiratory distress  Auscultation of lungs: Clear to auscultation, no rales, no rhonchi, no wheezing, good air movement  Cardiovascular  Auscultation of heart: Normal rate and rhythm, normal S1 and S2, no murmurs  Carotid pulses: Normal, 2+ bilaterally  Peripheral vascular exam: Normal pulses throughout, no tenderness, erythema or swelling  Pedal pulses: Normal, 2+ bilaterally  Examination of extremities for edema and/or varicosities: Normal    Abdomen  Abdomen: Non-tender and no distention  Liver and spleen: No hepatomegaly or splenomegaly  Musculoskeletal Gait and station: Normal gait  -- Digits and nails: Normal without clubbing or cyanosis  -- Inspection/palpation of joints, bones, and muscles: Normal, ROM normal    Skin - Skin and subcutaneous tissue: Normal without rashes or lesions  Skin is warm and well perfused, normal turgor  Neurologic - Cranial nerves: II - XII intact  -- Speech: Normal    Psychiatric - Orientation to person, place, and time: Normal -- Mood and affect: Normal       Results/Data   Rhythm and rate:  normal sinus rhythm  T waves:   there are nonspecific ST-T wave changes  Future Appointments    Date/Time Provider Specialty Site   01/24/2018 02:15 PM HORACE Krishnamurthy Pain Management [facility] SPINE   01/02/2018 11:30 AM Sabrina Mora [facility]     End of Encounter Meds    1  TiZANidine HCl - 2 MG Oral Tablet; Take 1 tablet at bedtime daily;  Therapy: 98KRJ3429 to (Evaluate:55Hyv4902)  Requested for: 81UWQ8651; Last Rx:12Oct2017 Ordered    2  SUMAtriptan Succinate 100 MG Oral Tablet (Imitrex); TAKE 1 TABLET AT ONSET OF MIGRAINE HEADACHE  MAY REPEAT IN 2 HOURS IF NEEDED; Therapy: 73VDX0603 to (Last Rx:42Sly5378)  Requested for: 93PLU4870 Ordered    3  Atorvastatin Calcium 40 MG Oral Tablet (Lipitor); TAKE 1 TABLET DAILY; Therapy: 36GCG4359 to (22 032426)  Requested for: 77VWD8845; Last Rx:84Nfu8553 Ordered    4  Propranolol HCl  MG Oral Capsule Extended Release 24 Hour; TAKE 1 CAPSULE EVERY DAY; Therapy: 84DMB2587 to ([id number])  Requested for: 35ZXQ3485; Last Rx:56Gam5055 Ordered    5  Aspirin TABS;  Therapy: (Recorded:44Rjf0242) to Recorded    Signatures   Electronically signed by : Riccardo Tian DO; Dec  6 2017  1:59PM EST                       (Author)

## 2018-01-09 ENCOUNTER — ALLSCRIPTS OFFICE VISIT (OUTPATIENT)
Dept: OTHER | Facility: OTHER | Age: 68
End: 2018-01-09

## 2018-01-09 DIAGNOSIS — E78.5 HYPERLIPIDEMIA: ICD-10-CM

## 2018-01-09 DIAGNOSIS — Z78.0 ASYMPTOMATIC MENOPAUSAL STATE: ICD-10-CM

## 2018-01-09 DIAGNOSIS — N64.4 MASTODYNIA: ICD-10-CM

## 2018-01-09 DIAGNOSIS — Z11.59 ENCOUNTER FOR SCREENING FOR OTHER VIRAL DISEASES (CODE): ICD-10-CM

## 2018-01-09 DIAGNOSIS — Z00.00 ENCOUNTER FOR GENERAL ADULT MEDICAL EXAMINATION WITHOUT ABNORMAL FINDINGS: ICD-10-CM

## 2018-01-11 ENCOUNTER — HOSPITAL ENCOUNTER (OUTPATIENT)
Dept: NON INVASIVE DIAGNOSTICS | Facility: CLINIC | Age: 68
Discharge: HOME | End: 2018-01-11
Payer: COMMERCIAL

## 2018-01-11 ENCOUNTER — GENERIC CONVERSION - ENCOUNTER (OUTPATIENT)
Dept: OTHER | Facility: OTHER | Age: 68
End: 2018-01-11

## 2018-01-11 ENCOUNTER — GENERIC CONVERSION - ENCOUNTER (OUTPATIENT)
Dept: CARDIOLOGY CLINIC | Facility: CLINIC | Age: 68
End: 2018-01-11

## 2018-01-11 DIAGNOSIS — E78.5 HYPERLIPIDEMIA, UNSPECIFIED HYPERLIPIDEMIA TYPE: ICD-10-CM

## 2018-01-11 DIAGNOSIS — R42 DIZZINESS AND GIDDINESS: ICD-10-CM

## 2018-01-11 DIAGNOSIS — R00.2 PALPITATIONS: ICD-10-CM

## 2018-01-11 DIAGNOSIS — R94.31 NONSPECIFIC ABNORMAL ELECTROCARDIOGRAM (ECG) (EKG): ICD-10-CM

## 2018-01-11 DIAGNOSIS — R94.31 ABNORMAL ELECTROCARDIOGRAM: ICD-10-CM

## 2018-01-11 PROCEDURE — 93225 XTRNL ECG REC<48 HRS REC: CPT

## 2018-01-11 PROCEDURE — 93226 XTRNL ECG REC<48 HR SCAN A/R: CPT

## 2018-01-11 PROCEDURE — 93306 TTE W/DOPPLER COMPLETE: CPT

## 2018-01-11 NOTE — RESULT NOTES
Verified Results  (1) LIPID PANEL, FASTING 19Oct2016 10:39AM [employer]     Test Name Result Flag Reference   CHOLESTEROL, TOTAL 208 mg/dL H 125-200   HDL CHOLESTEROL 88 mg/dL  > OR = 46   TRIGLICERIDES 66 mg/dL  <157   LDL-CHOLESTEROL 107 mg/dL (calc)  <130   Desirable range <100 mg/dL for patients with CHD or  diabetes and <70 mg/dL for diabetic patients with  known heart disease  CHOL/HDLC RATIO 2 4 (calc)  < OR = 5 0   NON HDL CHOLESTEROL 120 mg/dL (calc)     Target for non-HDL cholesterol is 30 mg/dL higher than   LDL cholesterol target  (1) CBC/PLT/DIFF [id number] 10:39AM [facility]     Test Name Result Flag Reference   WHITE BLOOD CELL COUNT 4 8 Thousand/uL  3 8-10 8   RED BLOOD CELL COUNT 4 19 Million/uL  3 80-5 10   HEMOGLOBIN 12 6 g/dL  11 7-15 5   HEMATOCRIT 37 8 %  35 0-45 0   MCV 90 3 fL  80 0-100 0   MCH 30 0 pg  27 0-33 0   MCHC 33 2 g/dL  32 0-36 0   RDW 13 8 %  11 0-15 0   PLATELET COUNT 770 Thousand/uL  140-400   MPV 10 6 fL  7 5-11 5   ABSOLUTE NEUTROPHILS 2650 cells/uL  1554-6750   ABSOLUTE LYMPHOCYTES 1430 cells/uL  850-3900   ABSOLUTE MONOCYTES 533 cells/uL  200-950   ABSOLUTE EOSINOPHILS 168 cells/uL     ABSOLUTE BASOPHILS 19 cells/uL  0-200   NEUTROPHILS 55 2 %     LYMPHOCYTES 29 8 %     MONOCYTES 11 1 %     EOSINOPHILS 3 5 %     BASOPHILS 0 4 %       (1) COMPREHENSIVE METABOLIC PANEL [id number] 49:89KH [facility]     Test Name Result Flag Reference   GLUCOSE 92 mg/dL  65-99   Fasting reference interval   UREA NITROGEN (BUN) 20 mg/dL  7-25   CREATININE 0 85 mg/dL  0 50-0 99   For patients >52years of age, the reference limit  for Creatinine is approximately 13% higher for people  identified as -American  eGFR NON-AFR   AMERICAN 73 mL/min/1 73m2  > OR = 60   eGFR AFRICAN AMERICAN 85 mL/min/1 73m2  > OR = 60   BUN/CREATININE RATIO   5-17   NOT APPLICABLE (calc)   SODIUM 137 mmol/L  135-146   POTASSIUM 3 6 mmol/L  3 5-5 3   CHLORIDE 102 mmol/L     CARBON DIOXIDE 26 mmol/L  20-31   CALCIUM 9 3 mg/dL  8 6-10 4   PROTEIN, TOTAL 7 2 g/dL  6 1-8 1   ALBUMIN 4 0 g/dL  3 6-5 1   GLOBULIN 3 2 g/dL (calc)  1 9-3 7   ALBUMIN/GLOBULIN RATIO 1 3 (calc)  1 0-2 5   BILIRUBIN, TOTAL 0 6 mg/dL  0 2-1 2   ALKALINE PHOSPHATASE 74 U/L     AST 20 U/L  10-35   ALT 18 U/L  6-29     (Q) URINALYSIS MICROSCOPIC 19Oct2016 10:39AM [facility]   REPORT COMMENT:  PLUS REQ # [id number]  FASTING:YES     Test Name Result Flag Reference   WBC 0-5 /HPF  < OR = 5   RBC NONE SEEN /HPF  < OR = 2   SQUAMOUS EPITHELIAL CELLS 0-5 /HPF  < OR = 5   BACTERIA FEW /HPF A NONE SEEN   HYALINE CAST NONE SEEN /LPF  NONE SEEN

## 2018-01-13 VITALS
BODY MASS INDEX: 23.15 KG/M2 | DIASTOLIC BLOOD PRESSURE: 66 MMHG | HEIGHT: 67 IN | WEIGHT: 147.5 LBS | HEART RATE: 65 BPM | SYSTOLIC BLOOD PRESSURE: 135 MMHG

## 2018-01-13 VITALS
WEIGHT: 147.04 LBS | DIASTOLIC BLOOD PRESSURE: 72 MMHG | HEART RATE: 67 BPM | BODY MASS INDEX: 23.03 KG/M2 | SYSTOLIC BLOOD PRESSURE: 149 MMHG

## 2018-01-13 NOTE — PROGRESS NOTES
Assessment   1  Migraine (346 90) (G43 909)  2  Hyperlipidemia (272 4) (E78 5)  3  Former smoker (V15 82) (U84 223)   · [address] [place] [address] - smoked about 1 ppd for about 20 years intermittently  4  Encounter for preventive health examination (V70 0) (Z00 00)1   5  Well adult on routine health check (V70 0) (Z00 00)1      1 Amended By: Tana Cardona; Nov 19 2016 10:27 PM EST    Plan  Migraine    · Start: Ondansetron 4 MG Oral Tablet Dispersible (Zofran ODT); 1 po tid prn nausea  PMH: Flu vaccine need    · Temporarily Stop: Fluzone Quadrivalent Intramuscular Suspension    Discussion/Summary  health maintenance visit     Patient presents today for follow-up for chronic health issues  Her migraines have been relatively stable since going off of her Fiorinal chronically  She did have a very severe migraine last week which are not complete is surprised about as she had been basically on a suppression dose of Fiorinal chronically  If she has another migraine, I would suggest 2-3 Advil or ibuprofen in addition to a Fiorinal as soon as possible in the process  I also prescribed her Zofran to help prevent nausea and treat her migraine  She has a history of hyperlipidemia and remains on atorvastatin  She has a high HDL which is also helpful  She has a lesion on her left leg, right anterior leg and right forearm all which appear to be warts, but I'm going to remove them as they're bothersome to her  I will send a pathology  We'll schedule an appointment for that in the near future  Chief Complaint  Physical  Holding off on Flu shot      History of Present Illness  HM, Adult Female: The patient is being seen for a health maintenance evaluation  General Health: The patient's health since the last visit is described as good  Lifestyle:  She consumes a diverse and healthy diet  She does not have any weight concerns  Screening:   HPI: Patient presents today for follow-up for her chronic migraines   She has been doing well despite titrating back significant on her Fiorinal intake  She did have a significant headache about one week ago which started started with a severe frontal headache and rest to a migraine  She has history of hyperlipidemia  She tolerates atorvastatin without myalgias  She complains of several skin lesions, including Corey either leg as well as one on her right forearm  These have been present for several years seem to be enlarging gradually  Review of Systems    Constitutional: no fever, not feeling poorly, no recent weight gain, no chills, not feeling tired and no recent weight loss  Cardiovascular: the heart rate was not slow, no chest pain, the heart rate was not fast and no palpitations  Respiratory: no cough  Gastrointestinal: no abdominal pain, no nausea, no constipation and no diarrhea  Integumentary: no rashes  Neurological: headache  Active Problems   1  Acute right-sided low back pain without sciatica (724 2) (M54 5)  2  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)  3  Decreased visual acuity (369 9) (H54 7)  4  Dermatitis (692 9) (L30 9)  5  Encounter for screening colonoscopy (V76 51) (Z12 11)  6  Encounter for screening mammogram for malignant neoplasm of breast (V76 12)   (Z12 31)  7  Hearing Loss (389 9)  8  Herniated Disc (T8 - T9) Bilateral (722 11)  9  History of allergy (V15 09) (Z88 9)  10  Hyperlipidemia (272 4) (E78 5)  11  Irritable bowel syndrome (564 1) (K58 9)  12  Left knee pain (719 46) (M25 562)  13  Lightheadedness (780 4) (R42)  14  Migraine (346 90) (G43 909)  15  Onychomycosis (110 1) (B35 1)  16  Osteoarthritis of both hands, unspecified osteoarthritis type (715 94) (M19 041,M19 042)  17  Screening for cervical cancer (V76 2) (Z12 4)  18   URI, acute (465 9) (J06 9)    Past Medical History    · History of Abdominal cramping (789 00) (R10 9)   · History of Acute bacterial conjunctivitis (372 03) (H10 30)   · History of Carpal tunnel syndrome, unspecified laterality (354 0) (G56 00)   · History of Cough (786 2) (R05)   · History of acute bronchitis (V12 69) (Z87 09)   · History of acute sinusitis (V12 69) (Z87 09)   · History of chest pain (V13 89) (N06 305)   · History of hysterectomy (V88 01) (Z90 710)   · History of upper respiratory infection (V12 09) (Z87 09)   · History of Joint pain, knee (719 46) (M25 569)   · History of Leg swelling (729 81) (M79 89)   · History of Palpitations (785 1) (R00 2)   · History of Rectal hemorrhage (569 3) (K62 5)   · History of Sore throat (462) (J02 9)    Surgical History    · History of Appendectomy   · History of Complete Colonoscopy   · History of Total Abdominal Hysterectomy With Removal Of Both Ovaries    Family History  Mother    · Family history of Heart Disease (V17 49)  Father    · Family history of Lung Cancer (V16 1)    Social History    · Alcohol Use (History)   · 2-3 BEERS WEEKLY   · Former smoker (V15 82) (S13 485)   · [address] [place] [address] - smoked about 1 ppd for about 20 years intermittently    Current Meds  1  Atorvastatin Calcium 40 MG Oral Tablet; TAKE 1 TABLET DAILY; Therapy: 26SDJ3328 to (Evaluate:13Apr2017)  Requested for: 68Zom3697; Last   Rx:18Apr2016 Ordered  2  Butalbital-ASA-Caffeine -40 MG Oral Capsule; Take 1 daily as needed for   migraine; Therapy: 72CSV3962 to (Evaluate:72Vnz9401)  Requested for: 91QBV8567; Last   Rx:10Nov2016 Ordered  3  Propranolol HCl  MG Oral Capsule Extended Release 24 Hour; TAKE 1   CAPSULE EVERY DAY; Therapy: 64PHW8134 to (Evaluate:02Jan2017)  Requested for: 75SKL6915; Last   Rx:60Key0245 Ordered    Allergies   1  No Known Drug Allergies    Vitals   Recorded: 00WPY9547 11:02AM   Heart Rate 60   Respiration 14   Systolic 415   Diastolic 70   Height 5 ft 7 in   Weight 151 lb 8 0 oz   BMI Calculated 23 73   BSA Calculated 1 8     Physical Exam    Constitutional   General appearance: No acute distress, well appearing and well nourished      Head and Face   Head and face: Normal     Palpation of the face and sinuses: No sinus tenderness  Eyes   Conjunctiva and lids: No swelling, erythema or discharge  Ears, Nose, Mouth, and Throat   External inspection of ears and nose: Normal     Otoscopic examination: Tympanic membranes translucent with normal light reflex  Canals patent without erythema  Hearing: Normal     Nasal mucosa, septum, and turbinates: Normal without edema or erythema  Lips, teeth, and gums: Normal, good dentition  Oropharynx: Normal with no erythema, edema, exudate or lesions  Neck   Neck: Supple, symmetric, trachea midline, no masses  Pulmonary   Respiratory effort: No increased work of breathing or signs of respiratory distress  Auscultation of lungs: Clear to auscultation  Cardiovascular   Auscultation of heart: Normal rate and rhythm, normal S1 and S2, no murmurs  Peripheral vascular exam: Normal     Examination of extremities for edema and/or varicosities: Normal     Abdomen   Abdomen: Non-tender, no masses  Lymphatic   Palpation of lymph nodes in neck: Abnormal   bilateral anterior cervical node enlargement (patient states that these are chronic), but no posterior cervical node enlargement and no submandibular node enlargement  Skin   Palpation of skin and subcutaneous tissue: Abnormal   She has warty type lesions on her left posterior leg, right anterior shin as well as right forearm  Neurologic   Cranial nerves: Cranial nerves II-XII intact  Psychiatric   Judgment and insight: Normal     Mood and affect: Normal        Health Management  Encounter for screening mammogram for malignant neoplasm of breast   Digital Bilateral Screening Mammogram With CAD; every 1 year; Last [id number]; Next  Due: 59Pca5506; Overdue  Screening for cervical cancer   (1) THIN PREP PAP WITH IMAGING; every 3 years; Next Permanently Deferred;      Future Appointments    Date/Time Provider Specialty Site   12/09/2016 03:15 PM LEONOR Man  [facility] 876     Signatures   Electronically signed by : LEONOR Mike ; Nov 19 2016 10:27PM EST                       (Author)

## 2018-01-15 NOTE — RESULT NOTES
Verified Results  TISSUE, 3 SPECIMENS [id number] 12:00AM [facility]     Test Name Result Flag Reference   A SOURCE      RIGHT ARM   A PROCEDURE      Biopsy/ies   A GROSS DESCRIPTION      Received in formalin labeled with the patient's   name and R arm is a shave biopsy of skin   measuring 6 x 6 x 1 mm  The specimen is bisected   and entirely submitted in one cassette  A MICRO DESCRIPTION      There is hyperkeratosis above digitated epidermal   hyperplasia and the elongated rete ridges at the   periphery of the lesion curve inward forming a   sort of collarette  There is a moderately dense,   inflammatory infiltrate beneath this lesion and   many lymphocytes are present within the   hyperplastic epidermis associated with   spongiosis  A DIAGNOSIS      VERRUCA VULGARIS, INFLAMED   B SOURCE      RIGHT CHIN   B PROCEDURE      Biopsy/ies   B GROSS DESCRIPTION      Received in formalin labeled with the patient's   name and R chin is a shave biopsy of skin   measuring 7 x 6 x 1 mm  The specimen is bisected   and entirely submitted in one cassette  B MICRO DESCRIPTION      There is hyperkeratosis above digitated epidermal   hyperplasia and the elongated rete ridges at the   periphery of the lesion curve inward forming a   sort of collarette  There is a moderately dense,   inflammatory infiltrate beneath this lesion and   many lymphocytes are present within the   hyperplastic epidermis associated with   spongiosis  B DIAGNOSIS      VERRUCA VULGARIS, INFLAMED   C SOURCE      LEFT CALF   C PROCEDURE      Biopsy/ies   C GROSS DESCRIPTION      Received in formalin labeled with the patient's   name and L calf is a shave biopsy of skin   measuring 7 x 6 x 1 mm  The specimen is bisected   and entirely submitted in one cassette     C MICRO DESCRIPTION      There is hyperkeratosis above digitated epidermal   hyperplasia and the elongated rete ridges at the   periphery of the lesion curve inward forming a   sort of collarette  There is a moderately dense,   inflammatory infiltrate beneath this lesion and   many lymphocytes are present within the   hyperplastic epidermis associated with   spongiosis     C DIAGNOSIS      VERRUCA VULGARIS, INFLAMED     (Q) TISSUE PATHOLOGY [id number] 12:00AM Tay Noel     Test Name Result Flag Reference   CLINICAL INFORMATION      None given   PATHOLOGIST      Jose E Pettit MD (electronic signature)  [facility] in Dermatopathology and Anatomic Pathology  [phone]         12/14/16

## 2018-01-17 ENCOUNTER — GENERIC CONVERSION - ENCOUNTER (OUTPATIENT)
Dept: OTHER | Facility: OTHER | Age: 68
End: 2018-01-17

## 2018-01-17 ENCOUNTER — LAB CONVERSION - ENCOUNTER (OUTPATIENT)
Dept: OTHER | Facility: OTHER | Age: 68
End: 2018-01-17

## 2018-01-17 LAB
A/G RATIO (HISTORICAL): 1.2 (CALC) (ref 1–2.5)
ALBUMIN SERPL BCP-MCNC: 4 G/DL (ref 3.6–5.1)
ALP SERPL-CCNC: 79 U/L (ref 33–130)
ALT SERPL W P-5'-P-CCNC: 18 U/L (ref 6–29)
AST SERPL W P-5'-P-CCNC: 19 U/L (ref 10–35)
BILIRUB SERPL-MCNC: 0.4 MG/DL (ref 0.2–1.2)
BUN SERPL-MCNC: 19 MG/DL (ref 7–25)
BUN/CREA RATIO (HISTORICAL): NORMAL (CALC) (ref 6–22)
CALCIUM SERPL-MCNC: 9.5 MG/DL (ref 8.6–10.4)
CHLORIDE SERPL-SCNC: 106 MMOL/L (ref 98–110)
CHOLEST SERPL-MCNC: 224 MG/DL
CHOLEST/HDLC SERPL: 2.4 (CALC)
CO2 SERPL-SCNC: 29 MMOL/L (ref 20–31)
CREAT SERPL-MCNC: 0.95 MG/DL (ref 0.5–0.99)
EGFR AFRICAN AMERICAN (HISTORICAL): 73 ML/MIN/1.73M2
EGFR-AMERICAN CALC (HISTORICAL): 63 ML/MIN/1.73M2
GAMMA GLOBULIN (HISTORICAL): 3.4 G/DL (CALC) (ref 1.9–3.7)
GLUCOSE (HISTORICAL): 95 MG/DL (ref 65–99)
HDLC SERPL-MCNC: 94 MG/DL
HEPATITIS C ANTIBODY (HISTORICAL): NORMAL
LDL CHOLESTEROL (HISTORICAL): 113 MG/DL (CALC)
NON-HDL-CHOL (CHOL-HDL) (HISTORICAL): 130 MG/DL (CALC)
POTASSIUM SERPL-SCNC: 4.1 MMOL/L (ref 3.5–5.3)
SIGNAL TO CUT-OFF (HISTORICAL): 0.07
SODIUM SERPL-SCNC: 141 MMOL/L (ref 135–146)
TOTAL PROTEIN (HISTORICAL): 7.4 G/DL (ref 6.1–8.1)
TRIGL SERPL-MCNC: 78 MG/DL

## 2018-01-18 ENCOUNTER — GENERIC CONVERSION - ENCOUNTER (OUTPATIENT)
Dept: OTHER | Facility: OTHER | Age: 68
End: 2018-01-18

## 2018-01-22 VITALS
BODY MASS INDEX: 23.49 KG/M2 | WEIGHT: 150 LBS | SYSTOLIC BLOOD PRESSURE: 100 MMHG | TEMPERATURE: 98.5 F | DIASTOLIC BLOOD PRESSURE: 60 MMHG

## 2018-01-22 VITALS
SYSTOLIC BLOOD PRESSURE: 112 MMHG | DIASTOLIC BLOOD PRESSURE: 64 MMHG | WEIGHT: 146.31 LBS | HEART RATE: 60 BPM | BODY MASS INDEX: 22.97 KG/M2 | HEIGHT: 67 IN

## 2018-01-23 VITALS
SYSTOLIC BLOOD PRESSURE: 110 MMHG | BODY MASS INDEX: 22.91 KG/M2 | HEIGHT: 67 IN | DIASTOLIC BLOOD PRESSURE: 64 MMHG | HEART RATE: 56 BPM | WEIGHT: 146 LBS | RESPIRATION RATE: 14 BRPM

## 2018-01-23 NOTE — PROGRESS NOTES
Assessment    1  Breast tenderness in female (611 71) (N64 4)   2  Encounter for preventive health examination (V70 0) (Z00 00)   3  Hyperlipidemia (272 4) (E78 5)   4  Chronic migraine (346 70) (G43 709)   5  Skin lesions (709 9) (L98 9)   6  Thoracic myofascial strain (847 1) (S29 019A)   7  Flu vaccine need (V04 81) (Z23)   8  Former smoker (V15 82) (R45 787)   · [address] [place] [address] - smoked about 1 ppd for about 20 years intermittently   9  Palpitations (785 1) (R00 2)    Plan  Breast tenderness in female    · MAMMO DIAGNOSTIC LEFT W CAD; Status:Hold For - Scheduling; Requested  [id number];    · MAMMO DIAGNOSTIC RIGHT W CAD; Status:Hold For - Scheduling; Requested  LFS:[id number];   Chronic migraine    · Butalbital-APAP-Caffeine -40 MG Oral Capsule; Take 1 tablet daily as  needed for migraines  Flu vaccine need    · Fluzone High-Dose 0 5 ML Intramuscular Suspension Prefilled Syringe  Health Maintenance, Need for hepatitis C screening test    · (1) HEP C ANTIBODY; Status:Active; Requested EUR:[id number];   Hyperlipidemia    · (1) COMPREHENSIVE METABOLIC PANEL; Status:Active; Requested HYH:[id number];    · (1) LIPID PANEL FASTING W DIRECT LDL REFLEX; Status:Active; Requested  VXI:[id number];   Need for vaccination with 13-polyvalent pneumococcal conjugate vaccine    · Prevnar 13 Intramuscular Suspension  Postmenopausal    · * DXA BONE DENSITY SPINE HIP AND PELVIS; Status:Hold For - Scheduling;  Requested VBH:[id number];   Skin lesions    · 2 - [facility]  (Dermatology) Co-Management  *  Status: Hold For -  Scheduling  Requested for: [id number]  Care Summary provided  : Yes    Discussion/Summary  health maintenance visit healthy adult female Currently, she eats an adequate diet and has an adequate exercise regimen  cervical cancer screening is not indicated Breast cancer screening: mammogram has been ordered  Colorectal cancer screening: colorectal cancer screening is current   Osteoporosis screening: bone mineral density testing has been ordered  Screening lab work includes labs ordered as above  The immunizations will be given as outlined in the orders  She was advised to be evaluated by a dentist  Patient discussion: discussed with the patient  Hepatitis C Screening: the patient was counseled on Hepatitis C screening  The patient agrees to Hepatitis C screening  1  Breast tenderness -I reassured the patient that her breasts felt normal on exam  She did not have any masses palpable  She was sent for mammogram as above  2  Chronic migraines -recently improved -she will continue with her current medication including propranolol 120 mg extended release daily  She will continue with Anacin as needed 1st line  She was given a script for Fioricet to replace the Fiorinal that she was previously using to help decrease her aspirin use  She was reminded this is controlled substance and she should limit the use of this as much as possible  She reports that she is currently using it on approximately a once weekly basis only when she gets her migraines  A contract was signed PDMP website was checked/found to be appropriate  She will continue to use Imitrex only if needed due to failure to resolve with the previous medications  3  Palpitations -likely related to Imitrex -she will continue to follow up with Cardiology later this week as already scheduled  4  Hyperlipidemia -she will continue with the atorvastatin 40 mg daily  She will check her lipid panel as above  5  Skin lesion -on the right hand between the thumb and index finger -referred to Dermatology for further assessment as it is a non resolving lesion that she has had for years but does seem to be slowly increasing in size  6  Thoracic myofascial strain - she will try tizanidine 2 tablets at bedtime for at least 4-5 days  If that is helping, then she can trial adding 1 tablet in the daytime if helping and not too sedated   She was encouraged to try this since she will be returning to him for follow-up in 2 weeks  Possible side effects of new medications were reviewed with the patient/guardian today  The treatment plan was reviewed with the patient/guardian  The patient/guardian understands and agrees with the treatment plan      Chief Complaint  Here for med refills and physical      History of Present Illness  HM, Adult Female: The patient is being seen for a health maintenance evaluation  General Health: The patient's health since the last visit is described as fair   concerned about the breasts  She has regular dental visits  The patient no recent visits - but planning  She complains of vision problems  Vision care includes having eye examinations 1 times per year  She denies hearing loss  Immunizations status: not up to date  Lifestyle:  She consumes a diverse and healthy diet  (well-balanced - drinks water and tea)   She does not exercise regularly  She does not use tobacco  She consumes alcohol  She reports a few glasses a week  She typically drinks wine  She denies drug use  Reproductive health: the patient is postmenopausal   s/p hysterectomy around 50 y o    Screening: Breast cancer screening includes a mammogram performed 2016  Colorectal cancer screening includes a colonoscopy performed 2016 - repeat in 3-5 years  Metabolic screening includes lipid profile performed 10/2016, glucose screening performed 10/2016, thyroid function test performed 7/2017 and no previous DEXA  Safety elements used: seat belt, sunscreen, smoke detector and carbon monoxide detector  Risk findings: no domestic violence     HPI: She notes that she has concern about her breasts feeling heavy - has no lumps but has felt it for a few weeks - no discharge - no skin changes - alternates sides and comes and goes - no pain - not enough to take anything     The patient continues with headaches - sees neurology and PM - notes that her Imitrex worked to relieve migraines and then was prescribed Depakote and Effexor but did not take them due to concerns about SEs - notes that her HAs are a few times a month - has Fiorinal that she still takes less than daily - wants the one with Tylenol instead because trying to cut back on ASA - no Botox since getting her migraines weekly on average - notes this is a recent improvement though - takes Fiorinal first line since Imitrex gives her palpitations - wants to stop all specialists and just be seen here    went to PM and wanted her to triple her tizanidine - not helpful at single dose and too sedated when tried to triple it - notes that she is not doing PT yet since waiting for cardio clearance (seeing them Thursday for ECHO and Holter due to palpitations thought to be from Imitrex)      Review of Systems    Constitutional: no fever and no chills  Cardiovascular: no chest pain    The patient presents with complaints of palpitations (with Imitrex)  Respiratory: no shortness of breath, no cough and no wheezing  Gastrointestinal: no nausea and no vomiting    The patient presents with complaints of diarrhea (possibly after the Imitrex)  Genitourinary: no dysuria  The patient presents with complaints of arthralgias (mainly in the hands and back )  Integumentary: no rashes and no skin lesions  Neurological: headache, but no fainting    no dizziness  Psychiatric: no anxiety and no depression  Hematologic/Lymphatic: no tendency for easy bleeding and no tendency for easy bruising  Active Problems    1  Abnormal EKG (794 31) (R94 31)   2  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)   3  Cervicalgia (723 1) (M54 2)   4  Chronic migraine (346 70) (G43 709)   5  Common migraine with intractable migraine (346 11) (G43 019)   6  Decreased visual acuity (369 9) (H54 7)   7  Encounter for screening colonoscopy (V76 51) (Z12 11)   8  Encounter for screening mammogram for malignant neoplasm of breast (V76 12)   (Z12 31)   9   Hearing Loss (389 9)   10  Herniated Disc (T8 - T9) Bilateral (722 11)   11  History of allergy (V15 09) (Z88 9)   12  Hyperlipidemia (272 4) (E78 5)   13  Irritable bowel syndrome (564 1) (K58 9)   14  Levoscoliosis (737 39) (M41 80)   15  Lumbago (724 2) (M54 5)   16  Memory difficulties (780 93) (R41 3)   17  Osteoarthritis of both hands, unspecified osteoarthritis type (715 94) (M19 041,M19 042)   18  Palpitations (785 1) (R00 2)   19  Screening for cervical cancer (V76 2) (Z12 4)   20  Skin lesions (709 9) (L98 9)   21  Thoracic myofascial strain (847 1) (S29 019A)   22   Well adult on routine health check (V70 0) (Z00 00)    Past Medical History    · History of Abdominal cramping (789 00) (R10 9)   · History of Acute bacterial conjunctivitis (372 03) (H10 30)   · History of Carpal tunnel syndrome, unspecified laterality (354 0) (G56 00)   · History of Cough (786 2) (R05)   · Flu vaccine need (V04 81) (Z23)   · History of acute bronchitis (V12 69) (Z87 09)   · History of acute sinusitis (V12 69) (Z87 09)   · History of chest pain (V13 89) (V00 025)   · History of dermatitis (V13 3) (Z87 2)   · History of hysterectomy (V88 01) (Z98 890,Z90 710)   · History of upper respiratory infection (V12 09) (Z87 09)   · History of Joint pain, knee (719 46) (M25 569)   · History of Leg swelling (729 81) (M79 89)   · History of Migraine (346 90) (G43 909)   · History of Rectal hemorrhage (569 3) (K62 5)   · History of Sore throat (462) (J02 9)    Surgical History    · History of Appendectomy   · History of Complete Colonoscopy   · History of Total Abdominal Hysterectomy With Removal Of Both Ovaries    Family History  Mother    · Family history of Heart Disease (V17 49)  Father    · Family history of Lung Cancer (V16 1)  Sibling    · Family history of multiple sclerosis (V17 2) (Z82 0)    Social History    · Alcohol Use (History)   · 2-3 BEERS WEEKLY   · Caffeine use (V49 89) (F15 90)   · Former smoker (P67 80) (S22 019)   · [address] [place] [address] - smoked about 1 ppd for about 20 years intermittently   ·    · Retired   · Three children    Current Meds   1  Aspirin TABS; Therapy: (Recorded:34Xle5989) to Recorded   2  Atorvastatin Calcium 40 MG Oral Tablet; TAKE 1 TABLET DAILY; Therapy: 76PKZ3046 to (467 20 767)  Requested for: 00WXA1410; Last   Rx:02Jan2018 Ordered   3  Propranolol HCl  MG Oral Capsule Extended Release 24 Hour; TAKE 1   CAPSULE EVERY DAY; Therapy: 76VHQ9861 to (467 20 767)  Requested for: 38SSZ3327; Last   Rx:[id number] Ordered   4  SUMAtriptan Succinate 100 MG Oral Tablet; TAKE 1 TABLET AT ONSET OF MIGRAINE   HEADACHE  MAY REPEAT IN 2 HOURS IF NEEDED; Therapy: 17JOA5895 to (Last Rx:82Avs3163)  Requested for: 21LDX3084 Ordered   5  TiZANidine HCl - 2 MG Oral Tablet; Take 1 tablet at bedtime daily; Therapy: 93KAB3674 to (Evaluate:25Bkt2587)  Requested for: 12KSW9324; Last   Rx:[id number] Ordered    Allergies    1  No Known Drug Allergies    Vitals   Recorded: 70BWZ4799 03:45PM   Heart Rate 60   Systolic 776   Diastolic 78   Height 5 ft 6 9 in   Weight 145 lb 2 oz   BMI Calculated 22 8   BSA Calculated 1 76     Physical Exam    Constitutional   General appearance: No acute distress, well appearing and well nourished  Head and Face   Head and face: Normal     Eyes   Conjunctiva and lids: No swelling, erythema or discharge  Pupils and irises: Equal, round, reactive to light  Ears, Nose, Mouth, and Throat   External inspection of ears and nose: Normal     Otoscopic examination: Tympanic membranes translucent with normal light reflex  Canals patent without erythema  Hearing: Normal     Nasal mucosa, septum, and turbinates: Abnormal   mild crusting in nose  Lips, teeth, and gums: Normal, good dentition  Oropharynx: Normal with no erythema, edema, exudate or lesions  Neck   Neck: Supple, symmetric, trachea midline, no masses  Thyroid: Normal, no thyromegaly      Pulmonary   Respiratory effort: No increased work of breathing or signs of respiratory distress  Auscultation of lungs: Clear to auscultation  Cardiovascular   Auscultation of heart: Normal rate and rhythm, normal S1 and S2, no murmurs  Peripheral vascular exam: Normal   Carotid: no bruit on the right and no bruit on the left  Radial: right 2+ and left 2+  Posterior tibialis: right 2+ and left 2+  Examination of extremities for edema and/or varicosities: Normal   no edema  Chest   Breasts: Normal, no dimpling or skin changes appreciated  Palpation of breasts and axillae: Normal, no masses palpated  Abdomen   Abdomen: Non-tender, no masses  Liver and spleen: No hepatomegaly or splenomegaly  Lymphatic   Palpation of lymph nodes in neck: No lymphadenopathy  Musculoskeletal   Gait and station: Normal     Muscle strength/tone: Normal   Motor Strength Findings: normal upper extremity strength and normal lower extremity strength  Skin   Examination of the skin for lesions: Abnormal   1 5 cm x 1 2 cm erythematous macule with slight overlying scaling noted along the radial aspect of the right hand just proximal to the 2nd MCP joint  Neurologic   Sensation: No sensory loss  Sensory exam: intact to light touch  Psychiatric   Mood and affect: Normal        Health Management  Encounter for screening mammogram for malignant neoplasm of breast   Digital Bilateral Screening Mammogram With CAD; every 1 year; Last 22QXT0211; Next  Due: 87Whj5775; Overdue  Screening for cervical cancer   (1) THIN PREP PAP WITH IMAGING; every 3 years; Next Permanently Deferred;      Future Appointments    Date/Time Provider Specialty Site   01/24/2018 02:15 PM [facility] [place] [address]     Signatures   Electronically signed by : Kvng Wong, [facility]; Jan 9 2018  5:42PM EST                       (Author)    Electronically signed by : LEONOR Ledezma ; Cezar 10 2018  5:20PM EST

## 2018-01-23 NOTE — RESULT NOTES
Verified Results  HOLTER MONITOR - Gay Kruger 115 [id number] 09:05AM Van Clarke Order Number: [id number]    - Patient Instructions: To schedule this appointment, please contact Central Scheduling at [phone]  Test Name Result Flag Reference   HOLTER MONITOR - 48 HOUR (Report)     PT NAME: Yovana Desir   : 1950 AGE: 74 y o  GENDER: female   MRN: [id number]  PROCEDURE: Holter monitor - 48 hour         INDICATIONS: Palpitations       FINDINGS:     Holter monitoring revealed predominant sinus rhythm with an average heart rate of 64 BPM, a minimum heart rate of 49 BPM, and a maximum heart rate of 111 BPM      There were about 2 ventricular ectopic beats, occurring as single PVC's with no evidence of ventricular tachycardia  There were about 287 supraventricular ectopic beats, mostly occurring as single PAC's with several short atrial runs (longest= 11 beats, no symptoms reported), with no evidence of sustained supraventricular tachycardia, or any atrial fibrillation, or    atrial flutter  There was no evidence of significant bradyarrhythmia or advanced heart block  The longest R-R interval was 1 4 seconds  The patient's symptom diary reported multiple symptoms of fluttering and palpitations correlating with sinus rhythm without ectopy or dysrhythmia

## 2018-01-23 NOTE — RESULT NOTES
Verified Results  (1) COMPREHENSIVE METABOLIC PANEL 67ANB9583 74:90DH Ary Thakur     Test Name Result Flag Reference   GLUCOSE 95 mg/dL  65-99   Fasting reference interval   UREA NITROGEN (BUN) 19 mg/dL  7-25   CREATININE 0 95 mg/dL  0 50-0 99   For patients >52years of age, the reference limit  for Creatinine is approximately 13% higher for people  identified as -American  eGFR NON-AFR  AMERICAN 63 mL/min/1 73m2  > OR = 60   eGFR AFRICAN AMERICAN 73 mL/min/1 73m2  > OR = 60   BUN/CREATININE RATIO   8-60   NOT APPLICABLE (calc)   SODIUM 141 mmol/L  135-146   POTASSIUM 4 1 mmol/L  3 5-5 3   CHLORIDE 106 mmol/L     CARBON DIOXIDE 29 mmol/L  20-31   CALCIUM 9 5 mg/dL  8 6-10 4   PROTEIN, TOTAL 7 4 g/dL  6 1-8 1   ALBUMIN 4 0 g/dL  3 6-5 1   GLOBULIN 3 4 g/dL (calc)  1 9-3 7   ALBUMIN/GLOBULIN RATIO 1 2 (calc)  1 0-2 5   BILIRUBIN, TOTAL 0 4 mg/dL  0 2-1 2   ALKALINE PHOSPHATASE 79 U/L     AST 19 U/L  10-35   ALT 18 U/L  6-29     (Q) LIPID PANEL WITH REFLEX TO DIRECT LDL [id number] 11:35AM Ary Thakur     Test Name Result Flag Reference   CHOLESTEROL, TOTAL 224 mg/dL H <200   HDL CHOLESTEROL 94 mg/dL  >40   TRIGLICERIDES 78 mg/dL  <119   LDL-CHOLESTEROL 113 mg/dL (calc) H    Reference range: <100     Desirable range <100 mg/dL for patients with CHD or  diabetes and <70 mg/dL for diabetic patients with  known heart disease  LDL-C is now calculated using the Andrew-Irwin   calculation, which is a validated novel method providing   better accuracy than the Friedewald equation in the   estimation of LDL-C  Vielka Moseley al  [facility] Batch  9540;640(14): 4246-6134   (http://Viki/faq/XDD987)   CHOL/HDLC RATIO 2 4 (calc)  <5 0   NON HDL CHOLESTEROL 130 mg/dL (calc) H <130   For patients with diabetes plus 1 major ASCVD risk   factor, treating to a non-HDL-C goal of <100 mg/dL   (LDL-C of <70 mg/dL) is considered a therapeutic   option       (Q) HEPATITIS C ANTIBODY 68AZB4552 11: 35AM Jj Bradley   REPORT COMMENT:  FASTING:YES     Test Name Result Flag Reference   HEPATITIS C ANTIBODY NON-REACTIVE  NON-REACTIVE   SIGNAL TO CUT-OFF 0 07  <1 00

## 2018-01-24 VITALS
BODY MASS INDEX: 22.78 KG/M2 | HEIGHT: 67 IN | SYSTOLIC BLOOD PRESSURE: 122 MMHG | DIASTOLIC BLOOD PRESSURE: 78 MMHG | HEART RATE: 60 BPM | WEIGHT: 145.13 LBS

## 2018-02-26 NOTE — RESULT NOTES
Verified Results  ECHO COMPLETE WITH CONTRAST IF INDICATED [id number] 09:04AM August Ray     Test Name Result Flag Reference   ECHO COMPLETE WITH CONTRAST IF INDICATED (Report)     [address] [facility]   [facility]  [facility], [address]   [phone]     Transthoracic Echocardiogram   2D, M-mode, Doppler, and Color Doppler     Study date: 2018     Patient: Madhav Durham   MR number: [id number]   Account number: [de-identified]   : 1950   Age: 74 years   Gender: Female   Status: Outpatient   Location: [address] [place] [address] [facility] [place]   Height: 67 in   Weight: 145 6 lb   BP: 110/ 64 mmHg     Indications: Palpitations  Diagnoses: R94 31 - Abnormal electrocardiogram [ECG] [EKG]     Sonographer: MATILDA Rm   Primary Physician: Myles Nichols MD   Referring Physician: Isabella Bacon DO   Group: [facility]   Interpreting Physician: Tang Gaxiola DO     SUMMARY     LEFT VENTRICLE:   Systolic function was normal  Ejection fraction was estimated to be 65 %  There were no regional wall motion abnormalities  Wall thickness was mildly increased  Doppler parameters were consistent with abnormal left ventricular relaxation (grade 1 diastolic dysfunction)  VENTRICULAR SEPTUM:   There was mild turbulence adjacent to the aortic valve (LVOT side), with a possible small perimembraneous septal aneurysm  No shunt visualized, but cannot be completely ruled out  AORTA:   The root exhibited mild dilatation, measuring upto 3 7 cm (2 1 cm/m2) at the proximal ascending aorta  HISTORY: PRIOR HISTORY: Abnormal EKG; Hyperlipidemia; Former smoker     PROCEDURE: The study was performed in the [address] [place] [address]  This was a routine study  The transthoracic approach was used  The study included complete 2D imaging, M-mode, complete spectral Doppler, and color Doppler  The   heart rate was 64 bpm, at the start of the study  Images were obtained from the parasternal, apical, subcostal, and suprasternal notch acoustic windows  Image quality was adequate  LEFT VENTRICLE: Size was normal  Systolic function was normal  Ejection fraction was estimated to be 65 %  There were no regional wall motion abnormalities  Wall thickness was mildly increased  DOPPLER: Doppler parameters were consistent   with abnormal left ventricular relaxation (grade 1 diastolic dysfunction)  VENTRICULAR SEPTUM: There was mild turbulence adjacent to the aortic valve (LVOT side), with a possible small perimembraneous septal aneurysm  No shunt visualized, but cannot be completely ruled out  RIGHT VENTRICLE: The size was normal  Systolic function was normal  Wall thickness was normal      LEFT ATRIUM: Size was normal      RIGHT ATRIUM: Size was normal      MITRAL VALVE: Valve structure was normal  There was normal leaflet separation  DOPPLER: The transmitral velocity was within the normal range  There was no evidence for stenosis  There was no regurgitation  AORTIC VALVE: The valve was trileaflet  Leaflets exhibited normal thickness and normal cuspal separation  DOPPLER: Transaortic velocity was within the normal range  There was no evidence for stenosis  There was no regurgitation  TRICUSPID VALVE: The valve structure was normal  There was normal leaflet separation  DOPPLER: The transtricuspid velocity was within the normal range  There was no evidence for stenosis  There was no regurgitation  PULMONIC VALVE: Leaflets exhibited normal thickness, no calcification, and normal cuspal separation  DOPPLER: The transpulmonic velocity was within the normal range  There was no regurgitation  PERICARDIUM: There was no pericardial effusion  The pericardium was normal in appearance  AORTA: The root exhibited mild dilatation, measuring upto 3 7 cm (2 1 cm/m2) at the proximal ascending aorta       SYSTEMIC VEINS: IVC: The inferior vena cava was normal in size and course  Respirophasic changes were normal      PULMONARY ARTERY: DOPPLER: Systolic pressure was within the normal range  Estimated peak pressure was 20 mmHg       SYSTEM MEASUREMENT TABLES     2D   %FS: 31 8 %   Ao Diam: 3 4 cm   EDV(Teich): 97 6 ml   EF(Teich): 59 9 %   ESV(Teich): 39 1 ml   IVSd: 1 1 cm   LA Area: 14 cm2   LA Diam: 3 2 cm   LVEDV MOD A4C: 88 8 ml   LVEF MOD A4C: 59 2 %   LVESV MOD A4C: 36 2 ml   LVIDd: 4 6 cm   LVIDs: 3 1 cm   LVLd A4C: 6 5 cm   LVLs A4C: 5 2 cm   LVPWd: 0 8 cm   RA Area: 14 cm2   RVIDd: 3 4 cm   SV MOD A4C: 52 6 ml   SV(Teich): 58 5 ml     CW   TR Vmax: 1 7 m/s   TR maxP 3 mmHg     MM   TAPSE: 1 8 cm     PW   E': 0 1 m/s   E/E': 9 5   MV A Sebas: 0 5 m/s   MV Dec Weston: 2 1 m/s2   MV DecT: 271 8 ms   MV E Sebas: 0 6 m/s   MV E/A Ratio: 1 2   MV PHT: 78 8 ms   MVA By PHT: 2 8 cm2     [employer] Accredited Echocardiography Laboratory     Prepared and electronically signed by     Zac Black DO   Signed 2018 11:52:02

## 2018-05-29 ENCOUNTER — OFFICE VISIT (OUTPATIENT)
Dept: FAMILY MEDICINE CLINIC | Facility: CLINIC | Age: 68
End: 2018-05-29
Payer: COMMERCIAL

## 2018-05-29 VITALS
HEART RATE: 66 BPM | TEMPERATURE: 98 F | HEIGHT: 67 IN | SYSTOLIC BLOOD PRESSURE: 110 MMHG | OXYGEN SATURATION: 98 % | BODY MASS INDEX: 22.96 KG/M2 | WEIGHT: 146.25 LBS | DIASTOLIC BLOOD PRESSURE: 68 MMHG

## 2018-05-29 DIAGNOSIS — R10.11 RIGHT UPPER QUADRANT PAIN: Primary | ICD-10-CM

## 2018-05-29 PROBLEM — I67.1 ANEURYSM, CEREBRAL: Status: ACTIVE | Noted: 2017-07-21

## 2018-05-29 PROBLEM — G43.019 COMMON MIGRAINE WITH INTRACTABLE MIGRAINE: Status: ACTIVE | Noted: 2017-07-06

## 2018-05-29 PROBLEM — N64.4 BREAST TENDERNESS IN FEMALE: Status: ACTIVE | Noted: 2018-01-09

## 2018-05-29 PROBLEM — S29.019A THORACIC MYOFASCIAL STRAIN: Status: ACTIVE | Noted: 2017-12-01

## 2018-05-29 PROBLEM — R42 DIZZINESSES: Status: ACTIVE | Noted: 2017-07-06

## 2018-05-29 PROBLEM — M41.80 LEVOSCOLIOSIS: Status: ACTIVE | Noted: 2017-11-15

## 2018-05-29 PROBLEM — R94.31 ABNORMAL EKG: Status: ACTIVE | Noted: 2017-11-20

## 2018-05-29 PROBLEM — M54.50 LOW BACK PAIN: Status: ACTIVE | Noted: 2017-10-12

## 2018-05-29 PROBLEM — M54.2 NECK PAIN: Status: ACTIVE | Noted: 2017-07-06

## 2018-05-29 PROBLEM — R41.3 MEMORY DIFFICULTIES: Status: ACTIVE | Noted: 2017-07-06

## 2018-05-29 PROBLEM — IMO0002 CHRONIC MIGRAINE: Status: ACTIVE | Noted: 2017-11-15

## 2018-05-29 PROCEDURE — 1160F RVW MEDS BY RX/DR IN RCRD: CPT | Performed by: PHYSICIAN ASSISTANT

## 2018-05-29 PROCEDURE — 1036F TOBACCO NON-USER: CPT | Performed by: PHYSICIAN ASSISTANT

## 2018-05-29 PROCEDURE — 3008F BODY MASS INDEX DOCD: CPT | Performed by: PHYSICIAN ASSISTANT

## 2018-05-29 PROCEDURE — 99213 OFFICE O/P EST LOW 20 MIN: CPT | Performed by: PHYSICIAN ASSISTANT

## 2018-05-29 RX ORDER — RIZATRIPTAN BENZOATE 5 MG/1
1 TABLET ORAL
COMMUNITY
Start: 2017-10-12 | End: 2018-05-29

## 2018-05-29 RX ORDER — SUMATRIPTAN 100 MG/1
100 TABLET, FILM COATED ORAL AS NEEDED
COMMUNITY
Start: 2017-07-06 | End: 2019-03-25 | Stop reason: SDUPTHER

## 2018-05-29 RX ORDER — PROPRANOLOL HYDROCHLORIDE 120 MG/1
1 CAPSULE, EXTENDED RELEASE ORAL DAILY
COMMUNITY
Start: 2011-07-25 | End: 2018-07-13 | Stop reason: SDUPTHER

## 2018-05-29 RX ORDER — ATORVASTATIN CALCIUM 40 MG/1
1 TABLET, FILM COATED ORAL DAILY
COMMUNITY
Start: 2011-07-25 | End: 2018-06-25 | Stop reason: SDUPTHER

## 2018-05-29 RX ORDER — BUTALBITAL, ACETAMINOPHEN AND CAFFEINE 50; 325; 40 MG/1; MG/1; MG/1
CAPSULE ORAL
COMMUNITY
Start: 2018-01-09 | End: 2018-05-29

## 2018-05-29 RX ORDER — ASPIRIN 325 MG
750 TABLET ORAL DAILY
COMMUNITY
Start: 2010-09-24 | End: 2019-07-30

## 2018-05-29 RX ORDER — VENLAFAXINE 37.5 MG/1
TABLET ORAL
COMMUNITY
Start: 2017-11-15 | End: 2019-01-29

## 2018-05-29 RX ORDER — ESTRADIOL 1 MG/1
1 TABLET ORAL
COMMUNITY
Start: 2011-12-30 | End: 2018-05-29

## 2018-05-29 RX ORDER — TIZANIDINE 2 MG/1
1 TABLET ORAL
COMMUNITY
Start: 2017-10-12 | End: 2019-03-25

## 2018-05-29 RX ORDER — VENLAFAXINE 75 MG/1
TABLET ORAL DAILY
COMMUNITY
Start: 2017-11-15 | End: 2019-01-29

## 2018-05-29 NOTE — PATIENT INSTRUCTIONS
The patient was sent for an ultrasound of her right upper quadrant as well as for a CMP to assess her RUQ discomfort  We discussed some possible causes such as gallbladder dysfunction  She was encouraged to start monitoring if foods were affecting her discomfort  We reviewed that normally gallbladder issues will be triggered by eating fatty food whereas GERD will be triggered more by tomato products, spicy foods, and citrus  She will continue for now with her Zantac and IB Guard  She does also have a follow-up appointment with Dr Candice Joyce for her chronic GI problems in about 2 weeks  She should call with any worsening symptoms or failure to improve

## 2018-05-29 NOTE — PROGRESS NOTES
[place] [facility] [place] [facility]  FAMILY PRACTICE ACUTE OFFICE VISIT       NAME: You Desir  AGE: 74 y o  SEX: female       : 1950        MRN: [id number]    DATE: 2018  TIME: 3:48 PM    Assessment and Plan     Problem List Items Addressed This Visit     None      Visit Diagnoses     Right upper quadrant pain    -  Primary    Relevant Orders    US abdomen limited    Comprehensive metabolic panel        Patient Instructions   The patient was sent for an ultrasound of her right upper quadrant as well as for a CMP to assess her RUQ discomfort  We discussed some possible causes such as gallbladder dysfunction  She was encouraged to start monitoring if foods were affecting her discomfort  We reviewed that normally gallbladder issues will be triggered by eating fatty food whereas GERD will be triggered more by tomato products, spicy foods, and citrus  She will continue for now with her Zantac and IB Guard  She does also have a follow-up appointment with Dr Burgess Soler for her chronic GI problems in about 2 weeks  She should call with any worsening symptoms or failure to improve  Chief Complaint     Chief Complaint   Patient presents with    Abdominal Pain     right lower abdominal pain for almost 2 weeks        History of Present Illness   You Desir is a 74y o -year-old female who presents for RUQ pain  Abdominal Pain   This is a new problem  Episode onset: 2 weeks ago  The onset quality is gradual  The problem occurs intermittently (has more often than not - not waking from sleep)  The pain is located in the RUQ  The pain is at a severity of 4/10  The quality of the pain is aching  Associated symptoms include constipation and diarrhea  Pertinent negatives include no anorexia, belching, dysuria, fever, frequency, headaches, hematochezia, hematuria, melena, nausea or vomiting  Associated symptoms comments: No rash           Review of Systems   Review of Systems   Constitutional: Negative for fever  Gastrointestinal: Positive for abdominal pain, constipation and diarrhea  Negative for anorexia, hematochezia, melena, nausea and vomiting  Heartburn - chronic - and IBS symptoms - better with Zantac and IBGuard   Genitourinary: Negative for dysuria, frequency and hematuria  Skin: Negative for rash  Neurological: Negative for dizziness and headaches  Active Problem List     Patient Active Problem List   Diagnosis    Abnormal EKG    Acute right-sided low back pain without sciatica    Aneurysm, cerebral    Benign neoplasm of colon    Breast tenderness in female    Neck pain    Chronic migraine    Common migraine with intractable migraine    Decreased visual acuity    Dermatitis    Dizzinesses    Hearing loss    Displacement of thoracic intervertebral disc without myelopathy    Hyperlipidemia    Irritable bowel syndrome    Left knee pain    Levoscoliosis    Lightheadedness    Low back pain    Memory difficulties    Migraine    Onychomycosis    Osteoarthritis of both hands    Palpitations    Skin lesions    Thoracic myofascial strain         Social History:  Social History     Social History    Marital status: /Civil Union     Spouse name: N/A    Number of children: N/A    Years of education: N/A     Occupational History    Not on file  Social History Main Topics    Smoking status: Former Smoker     Quit date: 1998    Smokeless tobacco: Never Used    Alcohol use Yes    Drug use: No    Sexual activity: Not on file     Other Topics Concern    Not on file     Social History Narrative    No narrative on file       Objective     Vitals:    05/29/18 1506   BP: 110/68   Pulse: 66   Temp: 98 °F (36 7 °C)   SpO2: 98%     Wt Readings from Last 3 Encounters:   05/29/18 66 3 kg (146 lb 4 oz)   01/09/18 65 8 kg (145 lb 2 1 oz)   12/06/17 66 2 kg (146 lb)       Physical Exam   Constitutional: She appears well-developed and well-nourished   No distress  HENT:   Head: Normocephalic and atraumatic  Right Ear: External ear normal    Left Ear: External ear normal    Nose: Nose normal    Mouth/Throat: Oropharynx is clear and moist    Neck: Neck supple  No thyromegaly present  Cardiovascular: Normal rate, regular rhythm, normal heart sounds and intact distal pulses  No murmur heard  Pulmonary/Chest: Effort normal and breath sounds normal  She has no wheezes  She has no rales  She exhibits no tenderness (no tenderness over the ribs)  Abdominal: Soft  Bowel sounds are normal  She exhibits no distension and no mass  There is no tenderness  There is no rebound  Lymphadenopathy:     She has no cervical adenopathy  Skin: Skin is warm and dry  No rash noted  Psychiatric: She has a normal mood and affect  Her behavior is normal    Vitals reviewed        Pertinent Laboratory/Diagnostic Studies:  Results for orders placed or performed in visit on 01/17/18   HEPATITIS C ANTIBODY (HISTORICAL)   Result Value Ref Range    HEPATITIS C ANTIBODY NON-REACTIVE NON-REACTIVE    SIGNAL TO CUT-OFF (HISTORICAL) 0 07 <1 00   LIPID PANEL WITH DIRECT LDL REFLEX (HISTORICAL)   Result Value Ref Range    Cholesterol 224 (H) <200 mg/dL    HDL 94 >50 mg/dL    Triglycerides 78 <150 mg/dL    LDL CHOLESTEROL 113 (H) mg/dL (calc)    Chol/HDL Ratio 2 4 <5 0 (calc)    NON-HDL-CHOL (CHOL-HDL) 130 (H) <130 mg/dL (calc)       Orders Placed This Encounter   Procedures    Hm Mammography    US abdomen limited    Comprehensive metabolic panel    Hm Colonoscopy       ALLERGIES:  No Known Allergies    Current Medications     Current Outpatient Prescriptions   Medication Sig Dispense Refill    aspirin 325 mg tablet Take 325 mg by mouth daily        atorvastatin (LIPITOR) 40 mg tablet Take 1 tablet by mouth daily      propranolol (INDERAL LA) 120 mg 24 hr capsule Take 1 capsule by mouth daily      SUMAtriptan (IMITREX) 100 mg tablet Take 100 mg by mouth as needed        venlafaxine [facility]) 37 5 mg tablet Take by mouth      venlafaxine (EFFEXOR) 75 mg tablet Take by mouth Daily      tiZANidine (ZANAFLEX) 2 mg tablet Take 1 tablet by mouth       No current facility-administered medications for this visit            Maddy Hill PA-C  5/29/2018 3:48 PM  [facility]AND[facility]AL [facility]

## 2018-05-31 LAB
ALBUMIN SERPL-MCNC: 3.9 G/DL (ref 3.6–5.1)
ALBUMIN/GLOB SERPL: 1.2 (CALC) (ref 1–2.5)
ALP SERPL-CCNC: 64 U/L (ref 33–130)
ALT SERPL-CCNC: 15 U/L (ref 6–29)
AST SERPL-CCNC: 18 U/L (ref 10–35)
BILIRUB SERPL-MCNC: 0.5 MG/DL (ref 0.2–1.2)
BUN SERPL-MCNC: 21 MG/DL (ref 7–25)
BUN/CREAT SERPL: NORMAL (CALC) (ref 6–22)
CALCIUM SERPL-MCNC: 9.3 MG/DL (ref 8.6–10.4)
CHLORIDE SERPL-SCNC: 108 MMOL/L (ref 98–110)
CO2 SERPL-SCNC: 26 MMOL/L (ref 20–31)
CREAT SERPL-MCNC: 0.88 MG/DL (ref 0.5–0.99)
GLOBULIN SER CALC-MCNC: 3.2 G/DL (CALC) (ref 1.9–3.7)
GLUCOSE SERPL-MCNC: 95 MG/DL (ref 65–99)
POTASSIUM SERPL-SCNC: 4.2 MMOL/L (ref 3.5–5.3)
PROT SERPL-MCNC: 7.1 G/DL (ref 6.1–8.1)
SL AMB EGFR AFRICAN AMERICAN: 80 ML/MIN/1.73M2
SL AMB EGFR NON AFRICAN AMERICAN: 69 ML/MIN/1.73M2
SODIUM SERPL-SCNC: 141 MMOL/L (ref 135–146)

## 2018-06-06 ENCOUNTER — HOSPITAL ENCOUNTER (OUTPATIENT)
Dept: ULTRASOUND IMAGING | Facility: MEDICAL CENTER | Age: 68
Discharge: HOME | End: 2018-06-06
Payer: COMMERCIAL

## 2018-06-06 DIAGNOSIS — R10.11 RIGHT UPPER QUADRANT PAIN: ICD-10-CM

## 2018-06-06 PROCEDURE — 76705 ECHO EXAM OF ABDOMEN: CPT

## 2018-06-25 DIAGNOSIS — E78.5 HYPERLIPIDEMIA, UNSPECIFIED HYPERLIPIDEMIA TYPE: Primary | ICD-10-CM

## 2018-06-25 RX ORDER — ATORVASTATIN CALCIUM 40 MG/1
TABLET, FILM COATED ORAL
Qty: 90 TABLET | Refills: 1 | Status: SHIPPED | OUTPATIENT
Start: 2018-06-25 | End: 2018-12-21 | Stop reason: SDUPTHER

## 2018-07-13 DIAGNOSIS — I10 ESSENTIAL HYPERTENSION: Primary | ICD-10-CM

## 2018-07-13 RX ORDER — PROPRANOLOL HYDROCHLORIDE 120 MG/1
CAPSULE, EXTENDED RELEASE ORAL
Qty: 90 CAPSULE | Refills: 3 | Status: SHIPPED | OUTPATIENT
Start: 2018-07-13 | End: 2019-07-10 | Stop reason: SDUPTHER

## 2018-09-11 DIAGNOSIS — IMO0002 CHRONIC MIGRAINE: Primary | ICD-10-CM

## 2018-09-11 RX ORDER — BUTALBITAL, ACETAMINOPHEN AND CAFFEINE 50; 325; 40 MG/1; MG/1; MG/1
1 TABLET ORAL EVERY 4 HOURS PRN
Qty: 90 TABLET | Refills: 0 | Status: SHIPPED | OUTPATIENT
Start: 2018-09-11 | End: 2019-01-29 | Stop reason: ALTCHOICE

## 2018-12-21 DIAGNOSIS — E78.5 HYPERLIPIDEMIA, UNSPECIFIED HYPERLIPIDEMIA TYPE: ICD-10-CM

## 2018-12-21 RX ORDER — ATORVASTATIN CALCIUM 40 MG/1
TABLET, FILM COATED ORAL
Qty: 90 TABLET | Refills: 1 | Status: SHIPPED | OUTPATIENT
Start: 2018-12-21 | End: 2019-06-19 | Stop reason: SDUPTHER

## 2019-01-29 ENCOUNTER — OFFICE VISIT (OUTPATIENT)
Dept: FAMILY MEDICINE CLINIC | Facility: CLINIC | Age: 69
End: 2019-01-29
Payer: COMMERCIAL

## 2019-01-29 VITALS
BODY MASS INDEX: 22.6 KG/M2 | HEART RATE: 63 BPM | WEIGHT: 144 LBS | OXYGEN SATURATION: 98 % | HEIGHT: 67 IN | TEMPERATURE: 97.5 F | DIASTOLIC BLOOD PRESSURE: 66 MMHG | SYSTOLIC BLOOD PRESSURE: 120 MMHG

## 2019-01-29 DIAGNOSIS — Z79.82 ASPIRIN LONG-TERM USE: ICD-10-CM

## 2019-01-29 DIAGNOSIS — E78.5 HYPERLIPIDEMIA, UNSPECIFIED HYPERLIPIDEMIA TYPE: ICD-10-CM

## 2019-01-29 DIAGNOSIS — L30.9 HAND DERMATITIS: ICD-10-CM

## 2019-01-29 DIAGNOSIS — R10.13 ABDOMINAL DISCOMFORT, EPIGASTRIC: Primary | ICD-10-CM

## 2019-01-29 DIAGNOSIS — K29.00 ACUTE GASTRITIS WITHOUT HEMORRHAGE, UNSPECIFIED GASTRITIS TYPE: ICD-10-CM

## 2019-01-29 PROBLEM — R42 DIZZINESSES: Status: RESOLVED | Noted: 2017-07-06 | Resolved: 2019-01-29

## 2019-01-29 PROBLEM — G89.29 CHRONIC LOW BACK PAIN: Status: ACTIVE | Noted: 2017-10-12

## 2019-01-29 PROBLEM — G43.019 COMMON MIGRAINE WITH INTRACTABLE MIGRAINE: Status: RESOLVED | Noted: 2017-07-06 | Resolved: 2019-01-29

## 2019-01-29 PROBLEM — S29.019A THORACIC MYOFASCIAL STRAIN: Status: RESOLVED | Noted: 2017-12-01 | Resolved: 2019-01-29

## 2019-01-29 PROCEDURE — 99214 OFFICE O/P EST MOD 30 MIN: CPT | Performed by: FAMILY MEDICINE

## 2019-01-29 PROCEDURE — 1036F TOBACCO NON-USER: CPT | Performed by: FAMILY MEDICINE

## 2019-01-29 PROCEDURE — 1160F RVW MEDS BY RX/DR IN RCRD: CPT | Performed by: FAMILY MEDICINE

## 2019-01-29 PROCEDURE — 3008F BODY MASS INDEX DOCD: CPT | Performed by: FAMILY MEDICINE

## 2019-01-29 RX ORDER — TRIAMCINOLONE ACETONIDE 1 MG/G
CREAM TOPICAL 2 TIMES DAILY
Qty: 15 G | Refills: 0 | Status: SHIPPED | OUTPATIENT
Start: 2019-01-29 | End: 2020-10-21

## 2019-01-29 RX ORDER — PANTOPRAZOLE SODIUM 40 MG/1
40 TABLET, DELAYED RELEASE ORAL DAILY
Qty: 30 TABLET | Refills: 0 | Status: SHIPPED | OUTPATIENT
Start: 2019-01-29 | End: 2019-02-24 | Stop reason: SDUPTHER

## 2019-01-29 NOTE — PATIENT INSTRUCTIONS
Clinically has acute gastritis, does have chronic aspirin use  I would like her to avoid aspirin, use pantoprazole 40 mg daily, do blood work along with urinalysis in re-evaluate with us/full physical in 3 weeks  Call sooner if needed  If problem worsens or persists we will plan EGD  Back in June ultrasound right upper quadrant was negative  Include lipid panel with blood work as she is on statin  Also has inflamed hyperkeratotic area left lateral middle finger which may be related to reaction with ring  Can use triamcinolone twice daily x2 weeks

## 2019-01-29 NOTE — PROGRESS NOTES
FAMILY PRACTICE OFFICE VISIT  [id number]   [address]  [place], [place], [facility]      NAME: Ayush Currie  AGE: 79 y o  SEX: female  : 1950   MRN: [id number]    DATE: 2019  TIME: 12:29 PM    Assessment and Plan     Problem List Items Addressed This Visit        Unprioritized    Hyperlipidemia    Relevant Orders    Comprehensive metabolic panel    Lipid panel      Other Visit Diagnoses     Abdominal discomfort, epigastric    -  Primary    Relevant Orders    CBC    Comprehensive metabolic panel    Urinalysis with microscopic    Acute gastritis without hemorrhage, unspecified gastritis type        Relevant Medications    pantoprazole (PROTONIX) 40 mg tablet    Other Relevant Orders    CBC    Comprehensive metabolic panel    Aspirin long-term use        Hand dermatitis        Relevant Medications    triamcinolone (KENALOG) 0 1 % cream          Patient Instructions   Clinically has acute gastritis, does have chronic aspirin use  I would like her to avoid aspirin, use pantoprazole 40 mg daily, do blood work along with urinalysis in re-evaluate with us/full physical in 3 weeks  Call sooner if needed  If problem worsens or persists we will plan EGD  Back in  ultrasound right upper quadrant was negative  Include lipid panel with blood work as she is on statin  Also has inflamed hyperkeratotic area left lateral middle finger which may be related to reaction with ring  Can use triamcinolone twice daily x2 weeks  Chief Complaint     Chief Complaint   Patient presents with    abdominal discomfort     x 4 days        History of Present Illness   You Kirby is a 79y o -year-old female who I had met once back in 2016, we reviewed medical history  She has been noting 4 days discomfort epigastric, is improved to a degree today    Does note increased symptoms postprandial, some nausea with decreased appetite  Fortunately her weight is only down 2 lb versus last visit in May  At that time she had lateral right upper quadrant pain, that resolved  Ultrasound was negative right upper quadrant then  She does use aspirin daily, 2 to 3 pills ( back pain/ headaches)  No prior history gastritis, no melena  Review of Systems   Review of Systems   Constitutional: Positive for appetite change (dec few days)  Negative for fatigue, fever and unexpected weight change  HENT: Negative for sore throat and trouble swallowing  Respiratory: Negative for cough, chest tightness and shortness of breath  Cardiovascular: Negative for chest pain, palpitations and leg swelling  Gastrointestinal: Positive for nausea  Negative for abdominal pain, blood in stool and vomiting  No acid reflux   sees Dr Ernesto Olmstead, up-to-date with colonoscopy  No change in bowel   Genitourinary: Negative for dysuria and hematuria  Musculoskeletal: Positive for back pain  Neurological: Positive for headaches (Has not been requiring Imitrex  Is off Fioricet  Never used Effexor  Past evaluation with Neurology  )  Negative for dizziness and light-headedness  Hematological: Does not bruise/bleed easily  Psychiatric/Behavioral: Positive for behavioral problems (increased stress recently -   pet  ) and sleep disturbance (long term eber due to back pain)          Minimal social wine       Active Problem List     Patient Active Problem List   Diagnosis    Abnormal EKG    Aneurysm, cerebral    Benign neoplasm of colon    Breast tenderness in female    Neck pain    Chronic migraine    Hearing loss    Displacement of thoracic intervertebral disc without myelopathy    Hyperlipidemia    Irritable bowel syndrome    Levoscoliosis    Lightheadedness    Chronic low back pain    Memory difficulties    Migraine    Onychomycosis    Osteoarthritis of both hands    Palpitations    Skin lesions       Past Medical History:  No past medical history on file  Past Surgical History:  No past surgical history on file  Family History:  No family history on file  Social History:  Social History     Social History    Marital status: /Civil Union     Spouse name: N/A    Number of children: N/A    Years of education: N/A     Occupational History    Not on file  Social History Main Topics    Smoking status: Former Smoker     Quit date: 1998    Smokeless tobacco: Never Used    Alcohol use Yes    Drug use: No    Sexual activity: Not on file     Other Topics Concern    Not on file     Social History Narrative    No narrative on file       Objective     Vitals:    01/29/19 1149   BP: 120/66   BP Location: Left arm   Patient Position: Sitting   Cuff Size: Standard   Pulse: 63   Temp: 97 5 °F (36 4 °C)   SpO2: 98%   Weight: 65 3 kg (144 lb)   Height: 5' 7" (1 702 m)     Body mass index is 22 55 kg/m²  BP Readings from Last 3 Encounters:   01/29/19 120/66   05/29/18 110/68   01/09/18 122/78       Wt Readings from Last 3 Encounters:   01/29/19 65 3 kg (144 lb)   05/29/18 66 3 kg (146 lb 4 oz)   01/09/18 65 8 kg (145 lb 2 1 oz)       Physical Exam   Constitutional: She is oriented to person, place, and time  She appears well-developed and well-nourished  No distress  Eyes: Conjunctivae are normal  No scleral icterus  Cardiovascular: Normal rate, regular rhythm and normal heart sounds  No murmur heard  Pulmonary/Chest: Effort normal and breath sounds normal  No respiratory distress  She has no wheezes  She has no rales  Abdominal:   Localizes area discomfort epigastric, very minimal tenderness upon exam, no right upper quadrant tenderness, no guarding or rebound, bowel sounds present x4  No mass  No enlarged liver spleen   Musculoskeletal: She exhibits no edema  Lymphadenopathy:     She has no cervical adenopathy  Neurological: She is alert and oriented to person, place, and time     Psychiatric: She has a normal mood and affect  ALLERGIES:  No Known Allergies    Current Medications     Current Outpatient Prescriptions   Medication Sig Dispense Refill    atorvastatin (LIPITOR) 40 mg tablet TAKE 1 TABLET BY MOUTH EVERY DAY 90 tablet 1    propranolol (INDERAL LA) 120 mg 24 hr capsule TAKE 1 CAPSULE EVERY DAY 90 capsule 3    aspirin 325 mg tablet Take 325 mg by mouth daily        pantoprazole (PROTONIX) 40 mg tablet Take 1 tablet (40 mg total) by mouth daily 30 tablet 0    SUMAtriptan (IMITREX) 100 mg tablet Take 100 mg by mouth as needed        tiZANidine (ZANAFLEX) 2 mg tablet Take 1 tablet by mouth      triamcinolone (KENALOG) 0 1 % cream Apply topically 2 (two) times a day X 14 days 15 g 0     No current facility-administered medications for this visit                Most recent labs available from [address] [place] [address]   ( others may be available in [facility] / Media sections)  Lab Results   Component Value Date    WBC 0-5 10/19/2016    WBC 4 8 10/19/2016    HGB 12 6 10/19/2016    HCT 37 8 10/19/2016     10/19/2016    CHOL 224 (H) 01/15/2018    TRIG 78 01/15/2018    HDL 94 01/15/2018    ALT 18 01/15/2018    AST 19 01/15/2018     01/15/2018    K 4 2 05/30/2018     05/30/2018    CREATININE 0 95 01/15/2018    BUN 21 05/30/2018    CO2 26 05/30/2018     No results found for: [address]  Lab Results   Component Value Date    GFX3HBCBMHXZ 3 18 07/11/2017         Orders Placed This Encounter   Procedures    CBC    Comprehensive metabolic panel    Urinalysis with microscopic    Lipid panel         Abhianv Schilling DO

## 2019-01-31 LAB
ALBUMIN SERPL-MCNC: 4.1 G/DL (ref 3.6–5.1)
ALBUMIN/GLOB SERPL: 1.3 (CALC) (ref 1–2.5)
ALP SERPL-CCNC: 69 U/L (ref 33–130)
ALT SERPL-CCNC: 17 U/L (ref 6–29)
APPEARANCE UR: CLEAR
AST SERPL-CCNC: 20 U/L (ref 10–35)
BACTERIA UR QL AUTO: ABNORMAL /HPF
BILIRUB SERPL-MCNC: 0.6 MG/DL (ref 0.2–1.2)
BILIRUB UR QL STRIP: NEGATIVE
BUN SERPL-MCNC: 21 MG/DL (ref 7–25)
BUN/CREAT SERPL: NORMAL (CALC) (ref 6–22)
CALCIUM SERPL-MCNC: 9.6 MG/DL (ref 8.6–10.4)
CHLORIDE SERPL-SCNC: 106 MMOL/L (ref 98–110)
CHOLEST SERPL-MCNC: 207 MG/DL
CHOLEST/HDLC SERPL: 2.4 (CALC)
CO2 SERPL-SCNC: 29 MMOL/L (ref 20–32)
COLOR UR: YELLOW
CREAT SERPL-MCNC: 0.87 MG/DL (ref 0.5–0.99)
ERYTHROCYTE [DISTWIDTH] IN BLOOD BY AUTOMATED COUNT: 12.4 % (ref 11–15)
GLOBULIN SER CALC-MCNC: 3.1 G/DL (CALC) (ref 1.9–3.7)
GLUCOSE SERPL-MCNC: 94 MG/DL (ref 65–99)
GLUCOSE UR QL STRIP: NEGATIVE
HCT VFR BLD AUTO: 38.4 % (ref 35–45)
HDLC SERPL-MCNC: 86 MG/DL
HGB BLD-MCNC: 12.9 G/DL (ref 11.7–15.5)
HGB UR QL STRIP: NEGATIVE
HYALINE CASTS #/AREA URNS LPF: ABNORMAL /LPF
KETONES UR QL STRIP: NEGATIVE
LDLC SERPL CALC-MCNC: 105 MG/DL (CALC)
LEUKOCYTE ESTERASE UR QL STRIP: ABNORMAL
MCH RBC QN AUTO: 30.4 PG (ref 27–33)
MCHC RBC AUTO-ENTMCNC: 33.6 G/DL (ref 32–36)
MCV RBC AUTO: 90.6 FL (ref 80–100)
NITRITE UR QL STRIP: NEGATIVE
NONHDLC SERPL-MCNC: 121 MG/DL (CALC)
PH UR STRIP: ABNORMAL [PH] (ref 5–8)
PLATELET # BLD AUTO: 190 THOUSAND/UL (ref 140–400)
PMV BLD REES-ECKER: 13.3 FL (ref 7.5–12.5)
POTASSIUM SERPL-SCNC: 4.1 MMOL/L (ref 3.5–5.3)
PROT SERPL-MCNC: 7.2 G/DL (ref 6.1–8.1)
PROT UR QL STRIP: NEGATIVE
RBC # BLD AUTO: 4.24 MILLION/UL (ref 3.8–5.1)
RBC #/AREA URNS HPF: ABNORMAL /HPF
SL AMB EGFR AFRICAN AMERICAN: 80 ML/MIN/1.73M2
SL AMB EGFR NON AFRICAN AMERICAN: 69 ML/MIN/1.73M2
SODIUM SERPL-SCNC: 140 MMOL/L (ref 135–146)
SP GR UR STRIP: 1.02 (ref 1–1.03)
SQUAMOUS #/AREA URNS HPF: ABNORMAL /HPF
TRIGL SERPL-MCNC: 75 MG/DL
WBC # BLD AUTO: 4 THOUSAND/UL (ref 3.8–10.8)
WBC #/AREA URNS HPF: ABNORMAL /HPF

## 2019-02-24 DIAGNOSIS — K29.00 ACUTE GASTRITIS WITHOUT HEMORRHAGE, UNSPECIFIED GASTRITIS TYPE: ICD-10-CM

## 2019-02-25 RX ORDER — PANTOPRAZOLE SODIUM 40 MG/1
40 TABLET, DELAYED RELEASE ORAL DAILY PRN
Qty: 30 TABLET | Refills: 3 | Status: SHIPPED | OUTPATIENT
Start: 2019-02-25 | End: 2019-04-29 | Stop reason: SDUPTHER

## 2019-02-26 ENCOUNTER — TELEPHONE (OUTPATIENT)
Dept: INTERNAL MEDICINE CLINIC | Facility: CLINIC | Age: 69
End: 2019-02-26

## 2019-03-23 NOTE — PROGRESS NOTES
[facility]  FAMILY PRACTICE OFFICE VISIT       NAME: You Desir  AGE: 79 y o  SEX: female       : 1950        MRN: [id number]    DATE: 3/25/2019  TIME: 8:10 PM    Assessment and Plan     Problem List Items Addressed This Visit        Digestive    Gastritis     Patient has recurrence of symptoms of gastritis, but she is having some difficulty with anxiety recently as well as is still taking 2 tablets of aspirin on a daily basis  She was directed to stop taking the aspirin and instead use to extra Strength Tylenol up to 3 times daily as needed for her back pain  Cardiovascular and Mediastinum    Migraine     Improved  Continue with propranolol 120 mg daily and Imitrex 25 mg as needed  Relevant Medications    escitalopram (LEXAPRO) 5 mg tablet    SUMAtriptan (IMITREX) 25 mg tablet       Nervous and Auditory    Hearing loss     Referred to ENT  Relevant Orders    Ambulatory Referral to Otolaryngology       Other    Hyperlipidemia     Controlled besides minimal LDL elevation of 105  Continue Lipitor 40 mg daily  Encouraged to decrease intake of fatty food such as red meat, fried food, butter and cheese  Will continue to monitor  Anxiety     Appears to be having a lot of anxiety and possibly mild depression over the last few months  Could be contributing to her gastritis symptoms  Will start Lexapro 5 mg daily and follow-up in 1 month  Call if any concerns in the interim            Relevant Medications    escitalopram (LEXAPRO) 5 mg tablet      Other Visit Diagnoses     Well adult exam    -  Primary    Need for pneumococcal vaccine        Relevant Orders    PNEUMOCOCCAL POLYSACCHARIDE VACCINE 23-VALENT =>3YO SQ IM (Pneumovax) (Completed)    Screening for osteoporosis        Relevant Orders    DXA bone density spine hip and pelvis    Encounter for immunization              Gastritis  Patient has recurrence of symptoms of gastritis, but she is having some difficulty with anxiety recently as well as is still taking 2 tablets of aspirin on a daily basis  She was directed to stop taking the aspirin and instead use to extra Strength Tylenol up to 3 times daily as needed for her back pain  Migraine  Improved  Continue with propranolol 120 mg daily and Imitrex 25 mg as needed  Hearing loss  Referred to ENT  Anxiety  Appears to be having a lot of anxiety and possibly mild depression over the last few months  Could be contributing to her gastritis symptoms  Will start Lexapro 5 mg daily and follow-up in 1 month  Call if any concerns in the interim  Hyperlipidemia  Controlled besides minimal LDL elevation of 105  Continue Lipitor 40 mg daily  Encouraged to decrease intake of fatty food such as red meat, fried food, butter and cheese  Will continue to monitor  Chief Complaint     Chief Complaint   Patient presents with    Annual Exam    Anxiety       History of Present Illness   You Ness is a 79y o -year-old female who presents for her annual physical and   Patient presented for evaluation about 2 months ago with Dr Theodore Omer was felt to have evidence of gastritis due to chronic aspirin use  She was directed stop using aspirin and to start Protonix 40 mg daily  She was to follow up after about 3 weeks to reassess and to do physical   She notes that over the last 2 months, her pain has been better until about 2 weeks ago  She did not stop the aspirin but did decrease from 3 pills a day to 2 a day for her back  She notes that she has been anxious the last few months - not sad  She has had some things happen like her dog , etc  Little things bother her like going to a birthday party  The patient reports that migraines have been improved  Migraines occur rarely - improved since stopped Fioricet  Propranolol 120 mg daily is used daily for prophylaxis and Imitrex 100 mg is used for treatment       The patient continues with the Lipitor 40 milligrams daily  Last LDL was 105 in June 2019  The patient denies myalgias  She also notes trouble sleeping  She had trouble falling asleep and then trouble waking up  Diet - lots of fruits and veggies  Exercise - none  Sees ophtho regularly  Has trouble with hearing - not worsening - wants testing in the future  Last dentist visit was a long time ago - notes that she had a traumatizing event in her 25s - notes that she was to get 1 tooth removed but all upper teeth were removed while asleep            Review of Systems   Review of Systems   Constitutional: Negative for chills and fever  HENT: Negative for rhinorrhea (allergies starting) and sore throat  Eyes: Positive for discharge (from dry eye)  Negative for visual disturbance  Respiratory: Negative for cough, shortness of breath and wheezing  Cardiovascular: Positive for palpitations (not more than usual)  Negative for chest pain and leg swelling  Gastrointestinal: Positive for abdominal pain (upper abdomen)  Negative for constipation, diarrhea, nausea and vomiting  Endocrine: Negative for polydipsia and polyuria  Genitourinary: Negative for dysuria and frequency  Musculoskeletal: Positive for back pain  Negative for arthralgias and myalgias  Skin: Negative for rash  Neurological: Negative for dizziness, syncope and headaches  Hematological: Does not bruise/bleed easily  Psychiatric/Behavioral: Negative for dysphoric mood  The patient is not nervous/anxious          Active Problem List     Patient Active Problem List   Diagnosis    Abnormal EKG    Aneurysm, cerebral    Benign neoplasm of colon    Breast tenderness in female    Neck pain    Chronic migraine    Hearing loss    Displacement of thoracic intervertebral disc without myelopathy    Hyperlipidemia    Irritable bowel syndrome    Levoscoliosis    Lightheadedness    Chronic low back pain    Memory difficulties    Migraine    Onychomycosis    Osteoarthritis of both hands    Palpitations    Skin lesions    Anxiety    Gastritis         Past Medical History:  History reviewed  No pertinent past medical history  Past Surgical History:  History reviewed  No pertinent surgical history  Family History:  History reviewed  No pertinent family history      Social History:  Social History     Socioeconomic History    Marital status: /Civil Union     Spouse name: Not on file    Number of children: Not on file    Years of education: Not on file    Highest education level: Not on file   Occupational History    Not on file   Social Needs    Financial resource strain: Not on file    Food insecurity:     Worry: Not on file     Inability: Not on file    Transportation needs:     Medical: Not on file     Non-medical: Not on file   Tobacco Use    Smoking status: Former Smoker     Last attempt to quit:      Years since quittin 2    Smokeless tobacco: Never Used   Substance and Sexual Activity    Alcohol use: Yes     Frequency: Monthly or less     Drinks per session: 1 or 2    Drug use: No    Sexual activity: Not on file   Lifestyle    Physical activity:     Days per week: Not on file     Minutes per session: Not on file    Stress: Not on file   Relationships    Social connections:     Talks on phone: Not on file     Gets together: Not on file     Attends [employer] service: Not on file     Active member of club or organization: Not on file     Attends meetings of clubs or organizations: Not on file     Relationship status: Not on file    Intimate partner violence:     Fear of current or ex partner: Not on file     Emotionally abused: Not on file     Physically abused: Not on file     Forced sexual activity: Not on file   Other Topics Concern    Not on file   Social History Narrative    Not on file       Objective     Vitals:    19 1049   BP: 116/80   BP Location: Left arm   Patient Position: Sitting   Cuff Size: Large   Pulse: 60   SpO2: 99%   Weight: 66 3 kg (146 lb 2 oz)   Height: 5' 6 9" (1 699 m)     Wt Readings from Last 3 Encounters:   03/25/19 66 3 kg (146 lb 2 oz)   01/29/19 65 3 kg (144 lb)   05/29/18 66 3 kg (146 lb 4 oz)       Physical Exam   Constitutional: She appears well-developed and well-nourished  No distress  HENT:   Head: Normocephalic and atraumatic  Right Ear: Hearing, tympanic membrane, external ear and ear canal normal    Left Ear: Hearing, tympanic membrane, external ear and ear canal normal    Nose: Nose normal    Mouth/Throat: Oropharynx is clear and moist and mucous membranes are normal    Eyes: Pupils are equal, round, and reactive to light  Conjunctivae and lids are normal    Neck: Trachea normal and normal range of motion  Neck supple  Carotid bruit is not present  No thyroid mass and no thyromegaly present  Cardiovascular: Normal rate, regular rhythm, S1 normal, S2 normal, normal heart sounds and intact distal pulses  No murmur heard  Pulses:       Radial pulses are 2+ on the right side, and 2+ on the left side  Posterior tibial pulses are 2+ on the right side, and 2+ on the left side  Pulmonary/Chest: Effort normal and breath sounds normal  She has no decreased breath sounds  She has no wheezes  She has no rhonchi  She has no rales  Abdominal: Soft  Normal appearance and bowel sounds are normal  She exhibits no distension and no mass  There is no hepatosplenomegaly  There is no tenderness  No hernia  Musculoskeletal: Normal range of motion  Lymphadenopathy:     She has no cervical adenopathy  Neurological: She is alert  She has normal strength  No sensory deficit (light touch sensation intact and equal in UE and LE bilaterally)  Skin: Skin is warm and dry  No rash noted  Psychiatric: She has a normal mood and affect  Her behavior is normal    Vitals reviewed        Pertinent Laboratory/Diagnostic Studies:  Lab Results   Component Value Date    BUN 21 01/30/2019    CREATININE 0 95 01/15/2018    CALCIUM 9 6 01/30/2019     01/15/2018    K 4 1 01/30/2019    CO2 29 01/30/2019     01/30/2019     Lab Results   Component Value Date    ALT 17 01/30/2019    AST 20 01/30/2019    ALKPHOS 69 01/30/2019    BILITOT 0 4 01/15/2018       Lab Results   Component Value Date    WBC 4 0 01/30/2019    HGB 12 9 01/30/2019    HCT 38 4 01/30/2019    MCV 90 6 01/30/2019     01/30/2019     Lab Results   Component Value Date    CHOL 224 (H) 01/15/2018     Lab Results   Component Value Date    TRIG 75 01/30/2019     Lab Results   Component Value Date    HDL 86 01/30/2019     Results for orders placed or performed in visit on 01/29/19   CBC   Result Value Ref Range    White Blood Cell Count 4 0 3 8 - 10 8 Thousand/uL    Red Blood Cell Count 4 24 3 80 - 5 10 Million/uL    Hemoglobin 12 9 11 7 - 15 5 g/dL    HCT 38 4 35 0 - 45 0 %    MCV 90 6 80 0 - 100 0 fL    MCH 30 4 27 0 - 33 0 pg    MCHC 33 6 32 0 - 36 0 g/dL    RDW 12 4 11 0 - 15 0 %    Platelet Count 288 728 - 400 Thousand/uL    SL AMB MPV 13 3 (H) 7 5 - 12 5 fL   Comprehensive metabolic panel   Result Value Ref Range    Glucose, Random 94 65 - 99 mg/dL    BUN 21 7 - 25 mg/dL    Creatinine 0 87 0 50 - 0 99 mg/dL    eGFR Non  69 > OR = 60 mL/min/1 73m2    eGFR  80 > OR = 60 mL/min/1 73m2    SL AMB BUN/CREATININE RATIO NOT APPLICABLE 6 - 22 (calc)    Sodium 140 135 - 146 mmol/L    Potassium 4 1 3 5 - 5 3 mmol/L    Chloride 106 98 - 110 mmol/L    CO2 29 20 - 32 mmol/L    SL AMB CALCIUM 9 6 8 6 - 10 4 mg/dL    Protein, Total 7 2 6 1 - 8 1 g/dL    Albumin 4 1 3 6 - 5 1 g/dL    Globulin 3 1 1 9 - 3 7 g/dL (calc)    Albumin/Globulin Ratio 1 3 1 0 - 2 5 (calc)    TOTAL BILIRUBIN 0 6 0 2 - 1 2 mg/dL    Alkaline Phosphatase 69 33 - 130 U/L    AST 20 10 - 35 U/L    ALT 17 6 - 29 U/L   Urinalysis with microscopic   Result Value Ref Range    Color UA YELLOW YELLOW    Urine Appearance CLEAR CLEAR Specific Gravity 1 021 1 001 - 1 035    Ph < OR = 5 0 5 0 - 8 0    Glucose, Urine NEGATIVE NEGATIVE    Bilirubin, Urine NEGATIVE NEGATIVE    Ketone, Urine NEGATIVE NEGATIVE    Blood, Urine NEGATIVE NEGATIVE    Protein, Urine NEGATIVE NEGATIVE    SL AMB NITRITES URINE, QUAL  NEGATIVE NEGATIVE    Leukocyte Esterase 2+ (A) NEGATIVE    SL AMB WBC, URINE 20-40 (A) < OR = 5 /HPF    RBC, Urine 0-2 < OR = 2 /HPF    Squamous Epithelial Cells 0-5 < OR = 5 /HPF    Bacteria, UA FEW (A) NONE SEEN /HPF    Hyaline Casts NONE SEEN NONE SEEN /LPF   Lipid panel   Result Value Ref Range    Total Cholesterol 207 (H) <200 mg/dL    HDL 86 >50 mg/dL    Triglycerides 75 <150 mg/dL    LDL Direct 105 (H) mg/dL (calc)    Chol HDLC Ratio 2 4 <5 0 (calc)    Non-HDL Cholesterol 121 <130 mg/dL (calc)       Orders Placed This Encounter   Procedures    DXA bone density spine hip and pelvis    PNEUMOCOCCAL POLYSACCHARIDE VACCINE 23-VALENT =>1YO SQ IM (Pneumovax)    Ambulatory Referral to Otolaryngology       ALLERGIES:  No Known Allergies    Current Medications     Current Outpatient Medications   Medication Sig Dispense Refill    aspirin 325 mg tablet Take 750 mg by mouth daily       atorvastatin (LIPITOR) 40 mg tablet TAKE 1 TABLET BY MOUTH EVERY DAY 90 tablet 1    pantoprazole (PROTONIX) 40 mg tablet Take 1 tablet (40 mg total) by mouth daily as needed (heartburn) 30 tablet 3    propranolol (INDERAL LA) 120 mg 24 hr capsule TAKE 1 CAPSULE EVERY DAY 90 capsule 3    SUMAtriptan (IMITREX) 25 mg tablet Take 1 tablet (25 mg total) by mouth once as needed for migraine for up to 1 dose 9 tablet 3    triamcinolone (KENALOG) 0 1 % cream Apply topically 2 (two) times a day X 14 days 15 g 0    vitamin B-12 (CYANOCOBALAMIN) 100 MCG tablet Take by mouth daily      escitalopram (LEXAPRO) 5 mg tablet Take 1 tablet (5 mg total) by mouth daily 30 tablet 5     No current facility-administered medications for this visit            Health Maintenance Health Maintenance   Topic Date Due    DXA SCAN  1950    Pneumococcal PPSV23/PCV13 65+ Years / Low and Medium Risk (2 of 2 - PPSV23) 01/09/2019    INFLUENZA VACCINE  03/25/2020 (Originally 7/1/2018)    CRC Screening: Colonoscopy  10/07/2019    MAMMOGRAM  01/30/2020    Fall Risk  03/25/2020    Depression Screening PHQ  03/25/2020    Urinary Incontinence Screening  03/25/2020    BMI: Adult  03/25/2020    DTaP,Tdap,and Td Vaccines (2 - Td) 01/31/2024    Hepatitis C Screening  Completed    HEPATITIS B VACCINES  Aged Out     Immunization History   Administered Date(s) Administered    INFLUENZA 01/01/2006    Influenza Quadrivalent Preservative Free 3 years and older IM 10/17/2014, 10/01/2015    Influenza Split High Dose Preservative Free IM 01/09/2018    Influenza TIV (IM) 10/18/2011, 12/06/2013    Pneumococcal Conjugate 13-Valent 01/09/2018    Pneumococcal Polysaccharide PPV23 03/25/2019    Tdap 01/31/2014       Shawna Arzola PA-C  3/25/2019 8:10 PM  [facility]

## 2019-03-25 ENCOUNTER — OFFICE VISIT (OUTPATIENT)
Dept: FAMILY MEDICINE CLINIC | Facility: CLINIC | Age: 69
End: 2019-03-25
Payer: COMMERCIAL

## 2019-03-25 VITALS
WEIGHT: 146.13 LBS | BODY MASS INDEX: 22.93 KG/M2 | HEART RATE: 60 BPM | DIASTOLIC BLOOD PRESSURE: 80 MMHG | OXYGEN SATURATION: 99 % | SYSTOLIC BLOOD PRESSURE: 116 MMHG | HEIGHT: 67 IN

## 2019-03-25 DIAGNOSIS — Z23 NEED FOR PNEUMOCOCCAL VACCINE: ICD-10-CM

## 2019-03-25 DIAGNOSIS — Z13.820 SCREENING FOR OSTEOPOROSIS: ICD-10-CM

## 2019-03-25 DIAGNOSIS — K29.70 GASTRITIS, PRESENCE OF BLEEDING UNSPECIFIED, UNSPECIFIED CHRONICITY, UNSPECIFIED GASTRITIS TYPE: ICD-10-CM

## 2019-03-25 DIAGNOSIS — G43.701 CHRONIC MIGRAINE WITHOUT AURA WITH STATUS MIGRAINOSUS, NOT INTRACTABLE: ICD-10-CM

## 2019-03-25 DIAGNOSIS — Z00.00 WELL ADULT EXAM: Primary | ICD-10-CM

## 2019-03-25 DIAGNOSIS — F41.9 ANXIETY: ICD-10-CM

## 2019-03-25 DIAGNOSIS — H91.93 BILATERAL HEARING LOSS, UNSPECIFIED HEARING LOSS TYPE: ICD-10-CM

## 2019-03-25 DIAGNOSIS — E78.5 HYPERLIPIDEMIA, UNSPECIFIED HYPERLIPIDEMIA TYPE: ICD-10-CM

## 2019-03-25 DIAGNOSIS — Z23 ENCOUNTER FOR IMMUNIZATION: ICD-10-CM

## 2019-03-25 PROCEDURE — 99397 PER PM REEVAL EST PAT 65+ YR: CPT | Performed by: PHYSICIAN ASSISTANT

## 2019-03-25 PROCEDURE — 1160F RVW MEDS BY RX/DR IN RCRD: CPT

## 2019-03-25 PROCEDURE — 90732 PPSV23 VACC 2 YRS+ SUBQ/IM: CPT

## 2019-03-25 PROCEDURE — 90471 IMMUNIZATION ADMIN: CPT

## 2019-03-25 RX ORDER — ESCITALOPRAM OXALATE 5 MG/1
5 TABLET ORAL DAILY
Qty: 30 TABLET | Refills: 5 | Status: SHIPPED | OUTPATIENT
Start: 2019-03-25 | End: 2019-07-30 | Stop reason: SDUPTHER

## 2019-03-25 RX ORDER — SUMATRIPTAN 25 MG/1
25 TABLET, FILM COATED ORAL ONCE AS NEEDED
Qty: 9 TABLET | Refills: 3 | Status: SHIPPED | OUTPATIENT
Start: 2019-03-25 | End: 2020-06-03 | Stop reason: SDUPTHER

## 2019-03-25 RX ORDER — UREA 10 %
LOTION (ML) TOPICAL DAILY
COMMUNITY
End: 2019-07-30

## 2019-03-25 NOTE — PATIENT INSTRUCTIONS
Take 2 Extra Strength Tylenol up to 3 times a day in place of aspirin      Wellness Visit for Adults   AMBULATORY CARE:   A wellness visit  is when you see your healthcare provider to get screened for health problems  You can also get advice on how to stay healthy  Write down your questions so you remember to ask them  Ask your healthcare provider how often you should have a wellness visit  What happens at a wellness visit:  Your healthcare provider will ask about your health, and your family history of health problems  This includes high blood pressure, heart disease, and cancer  He or she will ask if you have symptoms that concern you, if you smoke, and about your mood  You may also be asked about your intake of medicines, supplements, food, and alcohol  Any of the following may be done:  · Your weight  will be checked  Your height may also be checked so your body mass index (BMI) can be calculated  Your BMI shows if you are at a healthy weight  · Your blood pressure  and heart rate will be checked  Your temperature may also be checked  · Blood and urine tests  may be done  Blood tests may be done to check your cholesterol levels  Abnormal cholesterol levels increase your risk for heart disease and stroke  You may also need a blood or urine test to check for diabetes if you are at increased risk  Urine tests may be done to look for signs of an infection or kidney disease  · A physical exam  includes checking your heartbeat and lungs with a stethoscope  Your healthcare provider may also check your skin to look for sun damage  · Screening tests  may be recommended  A screening test is done to check for diseases that may not cause symptoms  The screening tests you may need depend on your age, gender, family history, and lifestyle habits  For example, colorectal screening may be recommended if you are 48years old or older    Screening tests you need if you are a woman:   · A Pap smear  is used to screen for cervical cancer  Pap smears are usually done every 3 to 5 years depending on your age  You may need them more often if you have had abnormal Pap smear test results in the past  Ask your healthcare provider how often you should have a Pap smear  · A mammogram  is an x-ray of your breasts to screen for breast cancer  Experts recommend mammograms every 2 years starting at age 48 years  You may need a mammogram at age 52 years or younger if you have an increased risk for breast cancer  Talk to your healthcare provider about when you should start having mammograms and how often you need them  Vaccines you may need:   · Get an influenza vaccine  every year  The influenza vaccine protects you from the flu  Several types of viruses cause the flu  The viruses change over time, so new vaccines are made each year  · Get a tetanus-diphtheria (Td) booster vaccine  every 10 years  This vaccine protects you against tetanus and diphtheria  Tetanus is a severe infection that may cause painful muscle spasms and lockjaw  Diphtheria is a severe bacterial infection that causes a thick covering in the back of your mouth and throat  · Get a human papillomavirus (HPV) vaccine  if you are female and aged 23 to 32 or male 23 to 24 and never received it  This vaccine protects you from HPV infection  HPV is the most common infection spread by sexual contact  HPV may also cause vaginal, penile, and anal cancers  · Get a pneumococcal vaccine  if you are aged 72 years or older  The pneumococcal vaccine is an injection given to protect you from pneumococcal disease  Pneumococcal disease is an infection caused by pneumococcal bacteria  The infection may cause pneumonia, meningitis, or an ear infection  · Get a shingles vaccine  if you are aged 61 or older, even if you have had shingles before  The shingles vaccine is an injection to protect you from the varicella-zoster virus   This is the same virus that causes chickenpox  Shingles is a painful rash that develops in people who had chickenpox or have been exposed to the virus  How to eat healthy:  My Plate is a model for planning healthy meals  It shows the types and amounts of foods that should go on your plate  Fruits and vegetables make up about half of your plate, and grains and protein make up the other half  A serving of dairy is included on the side of your plate  The amount of calories and serving sizes you need depends on your age, gender, weight, and height  Examples of healthy foods are listed below:  · Eat a variety of vegetables  such as dark green, red, and orange vegetables  You can also include canned vegetables low in sodium (salt) and frozen vegetables without added butter or sauces  · Eat a variety of fresh fruits , canned fruit in 100% juice, frozen fruit, and dried fruit  · Include whole grains  At least half of the grains you eat should be whole grains  Examples include whole-wheat bread, wheat pasta, brown rice, and whole-grain cereals such as oatmeal     · Eat a variety of protein foods such as seafood (fish and shellfish), lean meat, and poultry without skin (turkey and chicken)  Examples of lean meats include pork leg, shoulder, or tenderloin, and beef round, sirloin, tenderloin, and extra lean ground beef  Other protein foods include eggs and egg substitutes, beans, peas, soy products, nuts, and seeds  · Choose low-fat dairy products such as skim or 1% milk or low-fat yogurt, cheese, and cottage cheese  · Limit unhealthy fats  such as butter, hard margarine, and shortening  Exercise:  Exercise at least 30 minutes per day on most days of the week  Some examples of exercise include walking, biking, dancing, and swimming  You can also fit in more physical activity by taking the stairs instead of the elevator or parking farther away from stores  Include muscle strengthening activities 2 days each week   Regular exercise provides many health benefits  It helps you manage your weight, and decreases your risk for type 2 diabetes, heart disease, stroke, and high blood pressure  Exercise can also help improve your mood  Ask your healthcare provider about the best exercise plan for you  General health and safety guidelines:   · Do not smoke  Nicotine and other chemicals in cigarettes and cigars can cause lung damage  Ask your healthcare provider for information if you currently smoke and need help to quit  E-cigarettes or smokeless tobacco still contain nicotine  Talk to your healthcare provider before you use these products  · Limit alcohol  A drink of alcohol is 12 ounces of beer, 5 ounces of wine, or 1½ ounces of liquor  · Lose weight, if needed  Being overweight increases your risk of certain health conditions  These include heart disease, high blood pressure, type 2 diabetes, and certain types of cancer  · Protect your skin  Do not sunbathe or use tanning beds  Use sunscreen with a SPF 15 or higher  Apply sunscreen at least 15 minutes before you go outside  Reapply sunscreen every 2 hours  Wear protective clothing, hats, and sunglasses when you are outside  · Drive safely  Always wear your seatbelt  Make sure everyone in your car wears a seatbelt  A seatbelt can save your life if you are in an accident  Do not use your cell phone when you are driving  This could distract you and cause an accident  Pull over if you need to make a call or send a text message  · Practice safe sex  Use latex condoms if are sexually active and have more than one partner  Your healthcare provider may recommend screening tests for sexually transmitted infections (STIs)  · Wear helmets, lifejackets, and protective gear  Always wear a helmet when you ride a bike or motorcycle, go skiing, or play sports that could cause a head injury  Wear protective equipment when you play sports   Wear a lifejacket when you are on a boat or doing water sports  © 2017 [employer] Information is for End User's use only and may not be sold, redistributed or otherwise used for commercial purposes  All illustrations and images included in CareNotes® are the copyrighted property of A D [employer] M [employer] Inc  or [employer]  The above information is an  only  It is not intended as medical advice for individual conditions or treatments  Talk to your doctor, nurse or pharmacist before following any medical regimen to see if it is safe and effective for you

## 2019-03-25 NOTE — ASSESSMENT & PLAN NOTE
Patient has recurrence of symptoms of gastritis, but she is having some difficulty with anxiety recently as well as is still taking 2 tablets of aspirin on a daily basis  She was directed to stop taking the aspirin and instead use to extra Strength Tylenol up to 3 times daily as needed for her back pain

## 2019-03-26 NOTE — ASSESSMENT & PLAN NOTE
Controlled besides minimal LDL elevation of 105  Continue Lipitor 40 mg daily  Encouraged to decrease intake of fatty food such as red meat, fried food, butter and cheese  Will continue to monitor

## 2019-03-26 NOTE — ASSESSMENT & PLAN NOTE
Appears to be having a lot of anxiety and possibly mild depression over the last few months  Could be contributing to her gastritis symptoms  Will start Lexapro 5 mg daily and follow-up in 1 month  Call if any concerns in the interim

## 2019-04-29 ENCOUNTER — OFFICE VISIT (OUTPATIENT)
Dept: FAMILY MEDICINE CLINIC | Facility: CLINIC | Age: 69
End: 2019-04-29
Payer: COMMERCIAL

## 2019-04-29 VITALS
OXYGEN SATURATION: 96 % | HEIGHT: 67 IN | BODY MASS INDEX: 22.91 KG/M2 | DIASTOLIC BLOOD PRESSURE: 70 MMHG | HEART RATE: 56 BPM | WEIGHT: 146 LBS | SYSTOLIC BLOOD PRESSURE: 102 MMHG

## 2019-04-29 DIAGNOSIS — K29.70 GASTRITIS, PRESENCE OF BLEEDING UNSPECIFIED, UNSPECIFIED CHRONICITY, UNSPECIFIED GASTRITIS TYPE: ICD-10-CM

## 2019-04-29 DIAGNOSIS — M54.2 NECK PAIN: ICD-10-CM

## 2019-04-29 DIAGNOSIS — F41.9 ANXIETY: Primary | ICD-10-CM

## 2019-04-29 DIAGNOSIS — K29.00 ACUTE GASTRITIS WITHOUT HEMORRHAGE, UNSPECIFIED GASTRITIS TYPE: ICD-10-CM

## 2019-04-29 PROCEDURE — 99213 OFFICE O/P EST LOW 20 MIN: CPT | Performed by: PHYSICIAN ASSISTANT

## 2019-04-29 PROCEDURE — 1036F TOBACCO NON-USER: CPT | Performed by: PHYSICIAN ASSISTANT

## 2019-04-29 PROCEDURE — 1160F RVW MEDS BY RX/DR IN RCRD: CPT | Performed by: PHYSICIAN ASSISTANT

## 2019-04-29 PROCEDURE — 3008F BODY MASS INDEX DOCD: CPT | Performed by: PHYSICIAN ASSISTANT

## 2019-04-29 RX ORDER — PANTOPRAZOLE SODIUM 20 MG/1
20 TABLET, DELAYED RELEASE ORAL DAILY
Qty: 30 TABLET | Refills: 1 | Status: SHIPPED | OUTPATIENT
Start: 2019-04-29 | End: 2019-05-25 | Stop reason: SDUPTHER

## 2019-05-18 DIAGNOSIS — K29.00 ACUTE GASTRITIS WITHOUT HEMORRHAGE, UNSPECIFIED GASTRITIS TYPE: ICD-10-CM

## 2019-05-19 RX ORDER — PANTOPRAZOLE SODIUM 40 MG/1
40 TABLET, DELAYED RELEASE ORAL DAILY PRN
Qty: 30 TABLET | Refills: 2 | OUTPATIENT
Start: 2019-05-19

## 2019-05-25 DIAGNOSIS — K29.00 ACUTE GASTRITIS WITHOUT HEMORRHAGE, UNSPECIFIED GASTRITIS TYPE: ICD-10-CM

## 2019-05-30 RX ORDER — PANTOPRAZOLE SODIUM 40 MG/1
40 TABLET, DELAYED RELEASE ORAL DAILY
Qty: 30 TABLET | Refills: 5 | Status: SHIPPED | OUTPATIENT
Start: 2019-05-30 | End: 2019-10-03 | Stop reason: SDUPTHER

## 2019-06-19 DIAGNOSIS — E78.5 HYPERLIPIDEMIA, UNSPECIFIED HYPERLIPIDEMIA TYPE: ICD-10-CM

## 2019-06-20 RX ORDER — ATORVASTATIN CALCIUM 40 MG/1
TABLET, FILM COATED ORAL
Qty: 90 TABLET | Refills: 1 | Status: SHIPPED | OUTPATIENT
Start: 2019-06-20 | End: 2019-12-10 | Stop reason: SDUPTHER

## 2019-07-10 DIAGNOSIS — I10 ESSENTIAL HYPERTENSION: ICD-10-CM

## 2019-07-10 RX ORDER — PROPRANOLOL HYDROCHLORIDE 120 MG/1
CAPSULE, EXTENDED RELEASE ORAL
Qty: 90 CAPSULE | Refills: 3 | Status: SHIPPED | OUTPATIENT
Start: 2019-07-10 | End: 2020-07-06

## 2019-07-28 NOTE — PROGRESS NOTES
[place] [facility]  FAMILY PRACTICE OFFICE VISIT       NAME: You Desir  AGE: 79 y o  SEX: female       : 1950        MRN: [id number]    DATE: 2019  TIME: 1:45 PM    Assessment and Plan     Problem List Items Addressed This Visit        Digestive    Irritable bowel syndrome     Patient will continue with IB guard as well as Metamucil which she finds to be helpful in regulating her bowel movements  Gastritis     Improved  Will plan on trying to wean off of pantoprazole after her next visit when she returns following her daughter's wedding  She will continue for pantoprazole 40 mg daily  Cardiovascular and Mediastinum    Migraine - Primary     Recently worsened with weekly migraines  She was given a script for Fioricet to restart using occasionally as needed for migraines  She feels that the Imitrex causes her to have some palpitations even when cutting it into half pill  Tylenol and ibuprofen have not been helpful with her migraines  Contract was signed and PDMP was checked/appropriate  Relevant Medications    escitalopram (LEXAPRO) 10 mg tablet    butalbital-acetaminophen-caffeine (FIORICET,ESGIC) -40 mg per tablet       Other    Anxiety     No fully controlled  Will increase Lexapro to 10 mg daily  Call if any problems or concerns  Otherwise will follow-up in 2 months  Relevant Medications    escitalopram (LEXAPRO) 10 mg tablet          Irritable bowel syndrome  Patient will continue with IB guard as well as Metamucil which she finds to be helpful in regulating her bowel movements  Gastritis  Improved  Will plan on trying to wean off of pantoprazole after her next visit when she returns following her daughter's wedding  She will continue for pantoprazole 40 mg daily  Migraine  Recently worsened with weekly migraines  She was given a script for Fioricet to restart using occasionally as needed for migraines    She feels that the Imitrex causes her to have some palpitations even when cutting it into half pill  Tylenol and ibuprofen have not been helpful with her migraines  Contract was signed and PDMP was checked/appropriate  Anxiety  No fully controlled  Will increase Lexapro to 10 mg daily  Call if any problems or concerns  Otherwise will follow-up in 2 months  Chief Complaint     Chief Complaint   Patient presents with    Follow-up     Anxiety       History of Present Illness   You Jeffries is a 79y o -year-old female who presents for 3 month follow-up on anxiety and gastritis  The patient reports that recently anxiety level has been improved since starting Lexapro but feels like the significance of the improvement is somewhat less profound lately  Daily medication includes Lexapro 5 mg daily  The patient denies panic attacks  The patient denies SI/HI  The patient reports that recently her gastritis has been significantly improved  We discussed weaning her Protonix to 20 mg daily and then trying to wean further  She is currently taking Protonix 40 mg - no attempt to wean was made  We had also discussed her neck pain at her last visit  I recommended increasing Tylenol to to extra Strength Tylenol 3 times daily as needed  Since her last visit, the neck pain has been stable but she does stretches rather than meds  The patient reports that migraines have been worse  Migraines occur about 1 times per week  Propranolol 120 mg daily is used daily for prophylaxis and Imitrex 25 mg is used for treatment (but notes that it causes her to have palpitations)  She wants to restart Fioricet instead  Tylenol and Advil do nothing  Review of Systems   Review of Systems   Respiratory: Negative for shortness of breath  Cardiovascular: Positive for palpitations (when takes Imitrex)  Negative for chest pain  Musculoskeletal: Positive for neck pain  Neurological: Positive for headaches  Psychiatric/Behavioral: Negative for dysphoric mood  The patient is nervous/anxious  Active Problem List     Patient Active Problem List   Diagnosis    Abnormal EKG    Aneurysm, cerebral    Benign neoplasm of colon    Breast tenderness in female    Neck pain    Chronic migraine    Hearing loss    Displacement of thoracic intervertebral disc without myelopathy    Hyperlipidemia    Irritable bowel syndrome    Levoscoliosis    Lightheadedness    Chronic low back pain    Memory difficulties    Migraine    Onychomycosis    Osteoarthritis of both hands    Palpitations    Skin lesions    Anxiety    Gastritis         Past Medical History:  History reviewed  No pertinent past medical history  Past Surgical History:  History reviewed  No pertinent surgical history  Family History:  History reviewed  No pertinent family history      Social History:  Social History     Socioeconomic History    Marital status: /Civil Union     Spouse name: Not on file    Number of children: Not on file    Years of education: Not on file    Highest education level: Not on file   Occupational History    Not on file   Social Needs    Financial resource strain: Not on file    Food insecurity:     Worry: Not on file     Inability: Not on file    Transportation needs:     Medical: Not on file     Non-medical: Not on file   Tobacco Use    Smoking status: Former Smoker     Last attempt to quit:      Years since quittin 5    Smokeless tobacco: Never Used   Substance and Sexual Activity    Alcohol use: Yes     Frequency: Monthly or less     Drinks per session: 1 or 2    Drug use: No    Sexual activity: Not on file   Lifestyle    Physical activity:     Days per week: Not on file     Minutes per session: Not on file    Stress: Not on file   Relationships    Social connections:     Talks on phone: Not on file     Gets together: Not on file     Attends Denominational service: Not on file     Active member of club or organization: Not on file     Attends meetings of clubs or organizations: Not on file     Relationship status: Not on file    Intimate partner violence:     Fear of current or ex partner: Not on file     Emotionally abused: Not on file     Physically abused: Not on file     Forced sexual activity: Not on file   Other Topics Concern    Not on file   Social History Narrative    Not on file       Objective     Vitals:    07/30/19 1300   BP: 104/74   BP Location: Left arm   Patient Position: Sitting   Cuff Size: Standard   Pulse: 56   SpO2: 99%   Weight: 64 6 kg (142 lb 8 oz)   Height: 5' 6 9" (1 699 m)     Wt Readings from Last 3 Encounters:   07/30/19 64 6 kg (142 lb 8 oz)   04/29/19 66 2 kg (146 lb)   03/25/19 66 3 kg (146 lb 2 oz)       Physical Exam   Constitutional: She appears well-developed and well-nourished  No distress  Neck: Normal range of motion  Neck supple  No thyromegaly present  Cardiovascular: Normal rate, regular rhythm, normal heart sounds and intact distal pulses  No murmur heard  Pulmonary/Chest: Effort normal and breath sounds normal  She has no wheezes  She has no rales  Abdominal: Soft  Bowel sounds are normal  She exhibits no distension and no mass  There is no tenderness  There is no guarding  Musculoskeletal: She exhibits no edema  Lymphadenopathy:     She has no cervical adenopathy  Skin: Skin is warm and dry  Psychiatric:   Mildly anxious   Vitals reviewed        Pertinent Laboratory/Diagnostic Studies:  Lab Results   Component Value Date    BUN 21 01/30/2019    CREATININE 0 87 01/30/2019    CALCIUM 9 6 01/30/2019     01/15/2018    K 4 1 01/30/2019    CO2 29 01/30/2019     01/30/2019     Lab Results   Component Value Date    ALT 17 01/30/2019    AST 20 01/30/2019    ALKPHOS 69 01/30/2019    BILITOT 0 4 01/15/2018       Lab Results   Component Value Date    WBC 4 0 01/30/2019    HGB 12 9 01/30/2019    HCT 38 4 01/30/2019    MCV 90 6 01/30/2019     01/30/2019     Lab Results   Component Value Date    CHOL 224 (H) 01/15/2018     Lab Results   Component Value Date    TRIG 75 01/30/2019     Lab Results   Component Value Date    HDL 86 01/30/2019     Results for orders placed or performed in visit on 01/29/19   CBC   Result Value Ref Range    White Blood Cell Count 4 0 3 8 - 10 8 Thousand/uL    Red Blood Cell Count 4 24 3 80 - 5 10 Million/uL    Hemoglobin 12 9 11 7 - 15 5 g/dL    HCT 38 4 35 0 - 45 0 %    MCV 90 6 80 0 - 100 0 fL    MCH 30 4 27 0 - 33 0 pg    MCHC 33 6 32 0 - 36 0 g/dL    RDW 12 4 11 0 - 15 0 %    Platelet Count 582 419 - 400 Thousand/uL    SL AMB MPV 13 3 (H) 7 5 - 12 5 fL   Comprehensive metabolic panel   Result Value Ref Range    Glucose, Random 94 65 - 99 mg/dL    BUN 21 7 - 25 mg/dL    Creatinine 0 87 0 50 - 0 99 mg/dL    eGFR Non  69 > OR = 60 mL/min/1 73m2    eGFR  80 > OR = 60 mL/min/1 73m2    SL AMB BUN/CREATININE RATIO NOT APPLICABLE 6 - 22 (calc)    Sodium 140 135 - 146 mmol/L    Potassium 4 1 3 5 - 5 3 mmol/L    Chloride 106 98 - 110 mmol/L    CO2 29 20 - 32 mmol/L    SL AMB CALCIUM 9 6 8 6 - 10 4 mg/dL    Protein, Total 7 2 6 1 - 8 1 g/dL    Albumin 4 1 3 6 - 5 1 g/dL    Globulin 3 1 1 9 - 3 7 g/dL (calc)    Albumin/Globulin Ratio 1 3 1 0 - 2 5 (calc)    TOTAL BILIRUBIN 0 6 0 2 - 1 2 mg/dL    Alkaline Phosphatase 69 33 - 130 U/L    AST 20 10 - 35 U/L    ALT 17 6 - 29 U/L   Urinalysis with microscopic   Result Value Ref Range    Color UA YELLOW YELLOW    Urine Appearance CLEAR CLEAR    Specific Gravity 1 021 1 001 - 1 035    Ph < OR = 5 0 5 0 - 8 0    Glucose, Urine NEGATIVE NEGATIVE    Bilirubin, Urine NEGATIVE NEGATIVE    Ketone, Urine NEGATIVE NEGATIVE    Blood, Urine NEGATIVE NEGATIVE    Protein, Urine NEGATIVE NEGATIVE    SL AMB NITRITES URINE, QUAL   NEGATIVE NEGATIVE    Leukocyte Esterase 2+ (A) NEGATIVE    SL AMB WBC, URINE 20-40 (A) < OR = 5 /HPF    RBC, Urine 0-2 < OR = 2 /HPF    Squamous Epithelial Cells 0-5 < OR = 5 /HPF    Bacteria, UA FEW (A) NONE SEEN /HPF    Hyaline Casts NONE SEEN NONE SEEN /LPF   Lipid panel   Result Value Ref Range    Total Cholesterol 207 (H) <200 mg/dL    HDL 86 >50 mg/dL    Triglycerides 75 <150 mg/dL    LDL Direct 105 (H) mg/dL (calc)    Chol HDLC Ratio 2 4 <5 0 (calc)    Non-HDL Cholesterol 121 <130 mg/dL (calc)         ALLERGIES:  No Known Allergies    Current Medications     Current Outpatient Medications   Medication Sig Dispense Refill    atorvastatin (LIPITOR) 40 mg tablet TAKE 1 TABLET BY MOUTH EVERY DAY 90 tablet 1    escitalopram (LEXAPRO) 10 mg tablet Take 1 tablet (10 mg total) by mouth daily 30 tablet 1    Multiple Vitamin (MULTI VITAMIN PO) Take by mouth daily      Multiple Vitamins-Minerals (MULTIVITAL PO) Take 1 tablet by mouth daily      pantoprazole (PROTONIX) 40 mg tablet Take 1 tablet (40 mg total) by mouth daily 30 tablet 5    propranolol (INDERAL LA) 120 mg 24 hr capsule TAKE 1 CAPSULE BY MOUTH EVERY DAY 90 capsule 3    SUMAtriptan (IMITREX) 25 mg tablet Take 1 tablet (25 mg total) by mouth once as needed for migraine for up to 1 dose 9 tablet 3    butalbital-acetaminophen-caffeine (FIORICET,ESGIC) -40 mg per tablet Take 1 tablet by mouth every 4 (four) hours as needed for headaches 20 tablet 0    triamcinolone (KENALOG) 0 1 % cream Apply topically 2 (two) times a day X 14 days (Patient not taking: Reported on 7/30/2019) 15 g 0     No current facility-administered medications for this visit            Health Maintenance     Health Maintenance   Topic Date Due    DXA SCAN  1950    CRC Screening: Colonoscopy  10/07/2019    INFLUENZA VACCINE  03/25/2020 (Originally 7/1/2019)    MAMMOGRAM  01/30/2020    Fall Risk  03/25/2020    Depression Screening PHQ  03/25/2020    Urinary Incontinence Screening  03/25/2020    BMI: Adult  04/29/2020    DTaP,Tdap,and Td Vaccines (2 - Td) 01/31/2024    Hepatitis C Screening Completed    Pneumococcal Vaccine: 65+ Years  Completed    Pneumococcal Vaccine: Pediatrics (0 to 5 Years) and At-Risk Patients (6 to 59 Years)  Aged Out    HEPATITIS B VACCINES  Aged [employer] History   Administered Date(s) Administered    INFLUENZA 01/01/2006    Influenza Quadrivalent Preservative Free 3 years and older IM 10/17/2014, 10/01/2015    Influenza Split High Dose Preservative Free IM 01/09/2018    Influenza TIV (IM) 10/18/2011, 12/06/2013    Pneumococcal Conjugate 13-Valent 01/09/2018    Pneumococcal Polysaccharide PPV23 03/25/2019    Tdap 01/31/2014       Mariana Davalos PA-C  7/30/2019 1:44 PM  [facility]

## 2019-07-30 ENCOUNTER — OFFICE VISIT (OUTPATIENT)
Dept: FAMILY MEDICINE CLINIC | Facility: CLINIC | Age: 69
End: 2019-07-30
Payer: COMMERCIAL

## 2019-07-30 VITALS
OXYGEN SATURATION: 99 % | DIASTOLIC BLOOD PRESSURE: 74 MMHG | HEART RATE: 56 BPM | HEIGHT: 67 IN | BODY MASS INDEX: 22.37 KG/M2 | SYSTOLIC BLOOD PRESSURE: 104 MMHG | WEIGHT: 142.5 LBS

## 2019-07-30 DIAGNOSIS — G43.909 MIGRAINE WITHOUT STATUS MIGRAINOSUS, NOT INTRACTABLE, UNSPECIFIED MIGRAINE TYPE: Primary | ICD-10-CM

## 2019-07-30 DIAGNOSIS — K29.70 GASTRITIS, PRESENCE OF BLEEDING UNSPECIFIED, UNSPECIFIED CHRONICITY, UNSPECIFIED GASTRITIS TYPE: ICD-10-CM

## 2019-07-30 DIAGNOSIS — K58.2 IRRITABLE BOWEL SYNDROME WITH BOTH CONSTIPATION AND DIARRHEA: ICD-10-CM

## 2019-07-30 DIAGNOSIS — F41.9 ANXIETY: ICD-10-CM

## 2019-07-30 PROCEDURE — 3008F BODY MASS INDEX DOCD: CPT | Performed by: PHYSICIAN ASSISTANT

## 2019-07-30 PROCEDURE — 99214 OFFICE O/P EST MOD 30 MIN: CPT | Performed by: PHYSICIAN ASSISTANT

## 2019-07-30 PROCEDURE — 1036F TOBACCO NON-USER: CPT | Performed by: PHYSICIAN ASSISTANT

## 2019-07-30 PROCEDURE — 1160F RVW MEDS BY RX/DR IN RCRD: CPT | Performed by: PHYSICIAN ASSISTANT

## 2019-07-30 RX ORDER — BUTALBITAL, ACETAMINOPHEN AND CAFFEINE 50; 325; 40 MG/1; MG/1; MG/1
1 TABLET ORAL EVERY 4 HOURS PRN
Qty: 20 TABLET | Refills: 0 | Status: SHIPPED | OUTPATIENT
Start: 2019-07-30 | End: 2019-11-15 | Stop reason: SDUPTHER

## 2019-07-30 RX ORDER — ESCITALOPRAM OXALATE 10 MG/1
10 TABLET ORAL DAILY
Qty: 30 TABLET | Refills: 1 | Status: SHIPPED | OUTPATIENT
Start: 2019-07-30 | End: 2019-08-21 | Stop reason: SDUPTHER

## 2019-07-30 NOTE — ASSESSMENT & PLAN NOTE
Improved  Will plan on trying to wean off of pantoprazole after her next visit when she returns following her daughter's wedding  She will continue for pantoprazole 40 mg daily

## 2019-07-30 NOTE — ASSESSMENT & PLAN NOTE
Recently worsened with weekly migraines  She was given a script for Fioricet to restart using occasionally as needed for migraines  She feels that the Imitrex causes her to have some palpitations even when cutting it into half pill  Tylenol and ibuprofen have not been helpful with her migraines  Contract was signed and PDMP was checked/appropriate

## 2019-07-30 NOTE — ASSESSMENT & PLAN NOTE
No fully controlled  Will increase Lexapro to 10 mg daily  Call if any problems or concerns  Otherwise will follow-up in 2 months

## 2019-07-30 NOTE — ASSESSMENT & PLAN NOTE
Patient will continue with IB guard as well as Metamucil which she finds to be helpful in regulating her bowel movements

## 2019-08-21 DIAGNOSIS — F41.9 ANXIETY: ICD-10-CM

## 2019-08-22 RX ORDER — ESCITALOPRAM OXALATE 10 MG/1
TABLET ORAL
Qty: 30 TABLET | Refills: 1 | Status: SHIPPED | OUTPATIENT
Start: 2019-08-22 | End: 2019-09-13 | Stop reason: SDUPTHER

## 2019-09-13 DIAGNOSIS — F41.9 ANXIETY: ICD-10-CM

## 2019-09-13 RX ORDER — ESCITALOPRAM OXALATE 10 MG/1
TABLET ORAL
Qty: 30 TABLET | Refills: 1 | Status: SHIPPED | OUTPATIENT
Start: 2019-09-13 | End: 2019-10-03 | Stop reason: SDUPTHER

## 2019-09-14 DIAGNOSIS — F41.9 ANXIETY: ICD-10-CM

## 2019-09-15 RX ORDER — ESCITALOPRAM OXALATE 5 MG/1
TABLET ORAL
Qty: 90 TABLET | Refills: 1 | OUTPATIENT
Start: 2019-09-15

## 2019-10-03 ENCOUNTER — OFFICE VISIT (OUTPATIENT)
Dept: FAMILY MEDICINE CLINIC | Facility: CLINIC | Age: 69
End: 2019-10-03
Payer: COMMERCIAL

## 2019-10-03 VITALS
SYSTOLIC BLOOD PRESSURE: 112 MMHG | HEART RATE: 60 BPM | DIASTOLIC BLOOD PRESSURE: 80 MMHG | WEIGHT: 143 LBS | RESPIRATION RATE: 16 BRPM | HEIGHT: 67 IN | OXYGEN SATURATION: 94 % | BODY MASS INDEX: 22.44 KG/M2

## 2019-10-03 DIAGNOSIS — G43.909 MIGRAINE WITHOUT STATUS MIGRAINOSUS, NOT INTRACTABLE, UNSPECIFIED MIGRAINE TYPE: ICD-10-CM

## 2019-10-03 DIAGNOSIS — K29.00 ACUTE GASTRITIS WITHOUT HEMORRHAGE, UNSPECIFIED GASTRITIS TYPE: ICD-10-CM

## 2019-10-03 DIAGNOSIS — K58.2 IRRITABLE BOWEL SYNDROME WITH BOTH CONSTIPATION AND DIARRHEA: ICD-10-CM

## 2019-10-03 DIAGNOSIS — F41.9 ANXIETY: Primary | ICD-10-CM

## 2019-10-03 PROCEDURE — 3008F BODY MASS INDEX DOCD: CPT | Performed by: PHYSICIAN ASSISTANT

## 2019-10-03 PROCEDURE — 99214 OFFICE O/P EST MOD 30 MIN: CPT | Performed by: PHYSICIAN ASSISTANT

## 2019-10-03 RX ORDER — ESCITALOPRAM OXALATE 10 MG/1
10 TABLET ORAL DAILY
Qty: 30 TABLET | Refills: 3 | Status: SHIPPED | OUTPATIENT
Start: 2019-10-03 | End: 2020-01-24 | Stop reason: SDUPTHER

## 2019-10-03 RX ORDER — PANTOPRAZOLE SODIUM 20 MG/1
20 TABLET, DELAYED RELEASE ORAL DAILY
Qty: 30 TABLET | Refills: 3 | Status: SHIPPED | OUTPATIENT
Start: 2019-10-03 | End: 2019-11-22 | Stop reason: SDUPTHER

## 2019-10-03 NOTE — ASSESSMENT & PLAN NOTE
Improved  Continue Lexapro 10 mg daily  She will continue with this dose for the next few months until she reaches at least 6 months duration and then will try to wean if desired still at that point  Call with any problems or concerns in the interim

## 2019-10-03 NOTE — PROGRESS NOTES
FAMILY PRACTICE OFFICE VISIT  [facility]IMARY [facility]       NAME: You Desir  AGE: 79 y o  SEX: female       : 1950        MRN: [id number]    DATE: 10/3/2019  TIME: 6:34 PM    Assessment and Plan     Problem List Items Addressed This Visit        Digestive    Irritable bowel syndrome     Controlled with IB Guard and Metamucil  Will monitor  Gastritis     Improved  Try to decrease to Protonix 20 mg daily  Call with any problems or concerns  Relevant Medications    pantoprazole (PROTONIX) 20 mg tablet       Cardiovascular and Mediastinum    Migraine     Improved with Lexapro  Continue with Fioricet or Imitrex occasionally a few times a month as needed  Relevant Medications    escitalopram (LEXAPRO) 10 mg tablet       Other    Anxiety - Primary     Improved  Continue Lexapro 10 mg daily  She will continue with this dose for the next few months until she reaches at least 6 months duration and then will try to wean if desired still at that point  Call with any problems or concerns in the interim  Relevant Medications    escitalopram (LEXAPRO) 10 mg tablet          Gastritis  Improved  Try to decrease to Protonix 20 mg daily  Call with any problems or concerns  Anxiety  Improved  Continue Lexapro 10 mg daily  She will continue with this dose for the next few months until she reaches at least 6 months duration and then will try to wean if desired still at that point  Call with any problems or concerns in the interim  Migraine  Improved with Lexapro  Continue with Fioricet or Imitrex occasionally a few times a month as needed  Irritable bowel syndrome  Controlled with IB Guard and Metamucil  Will monitor            Chief Complaint     Chief Complaint   Patient presents with    Follow-up     2month    Decline flu shot       History of Present Illness   You Cardenas is a 79y o -year-old female who presents for 2 month chronic problem follow-up  The patient reports that recently anxiety level has been well-controlled and improved  Daily medication includes Lexapro 10 mg daily (increased last visit)  Today's PHQ2 score was 0  The patient reports that migraines have been stable  Migraines occur about 3-4 times per month  Nothing is used daily for prophylaxis and Fioricet (given last visit since Imitrex caused palpitations) is used for treatment  Still reports that she uses Imitrex if Fioricet not helpful  The patient reports that GERD has been well-controlled with Protonix 40 mg  daily  She no longer has the stomach pain that she had when started on it and thought to have possible ulcer  IBS has been controlled with IB Guard and Metamucil  Review of Systems   Review of Systems   Respiratory: Negative for shortness of breath  Cardiovascular: Positive for palpitations (with Imitrex)  Negative for chest pain  Gastrointestinal: Negative for abdominal pain  Neurological: Positive for headaches  Psychiatric/Behavioral: Negative for dysphoric mood  The patient is not nervous/anxious  Active Problem List     Patient Active Problem List   Diagnosis    Abnormal EKG    Aneurysm, cerebral    Benign neoplasm of colon    Breast tenderness in female    Neck pain    Chronic migraine    Hearing loss    Displacement of thoracic intervertebral disc without myelopathy    Hyperlipidemia    Irritable bowel syndrome    Levoscoliosis    Lightheadedness    Chronic low back pain    Memory difficulties    Migraine    Onychomycosis    Osteoarthritis of both hands    Palpitations    Skin lesions    Anxiety    Gastritis         Past Medical History:  History reviewed  No pertinent past medical history  Past Surgical History:  History reviewed  No pertinent surgical history  Family History:  History reviewed  No pertinent family history      Social History:  Social History Socioeconomic History    Marital status: /Civil Union     Spouse name: Not on file    Number of children: Not on file    Years of education: Not on file    Highest education level: Not on file   Occupational History    Not on file   Social Needs    Financial resource strain: Not on file    Food insecurity:     Worry: Not on file     Inability: Not on file    Transportation needs:     Medical: Not on file     Non-medical: Not on file   Tobacco Use    Smoking status: Former Smoker     Last attempt to quit:      Years since quittin 7    Smokeless tobacco: Never Used   Substance and Sexual Activity    Alcohol use: Yes     Frequency: Monthly or less     Drinks per session: 1 or 2    Drug use: No    Sexual activity: Not on file   Lifestyle    Physical activity:     Days per week: Not on file     Minutes per session: Not on file    Stress: Not on file   Relationships    Social connections:     Talks on phone: Not on file     Gets together: Not on file     Attends [employer] service: Not on file     Active member of club or organization: Not on file     Attends meetings of clubs or organizations: Not on file     Relationship status: Not on file    Intimate partner violence:     Fear of current or ex partner: Not on file     Emotionally abused: Not on file     Physically abused: Not on file     Forced sexual activity: Not on file   Other Topics Concern    Not on file   Social History Narrative    Not on file       Objective     Vitals:    10/03/19 1109   BP: 112/80   Pulse: 60   Resp: 16   SpO2: 94%   Weight: 64 9 kg (143 lb)   Height: 5' 6 9" (1 699 m)     Wt Readings from Last 3 Encounters:   10/03/19 64 9 kg (143 lb)   19 64 6 kg (142 lb 8 oz)   19 66 2 kg (146 lb)       Physical Exam   Constitutional: She appears well-developed and well-nourished  No distress  HENT:   Head: Normocephalic and atraumatic  Neck: Neck supple  No thyromegaly present     Cardiovascular: Normal rate, regular rhythm, normal heart sounds and intact distal pulses  No murmur heard  No carotid bruits noted   Pulmonary/Chest: Effort normal and breath sounds normal  She has no wheezes  She has no rales  Musculoskeletal: She exhibits no edema  Lymphadenopathy:     She has cervical adenopathy (right anterior large adenopathy - chronic per patient since teenager and not changing)  Neurological: She is alert  Psychiatric: She has a normal mood and affect  Vitals reviewed        Pertinent Laboratory/Diagnostic Studies:  Lab Results   Component Value Date    BUN 21 01/30/2019    CREATININE 0 87 01/30/2019    CALCIUM 9 6 01/30/2019     01/15/2018    K 4 1 01/30/2019    CO2 29 01/30/2019     01/30/2019     Lab Results   Component Value Date    ALT 17 01/30/2019    AST 20 01/30/2019    ALKPHOS 69 01/30/2019    BILITOT 0 4 01/15/2018       Lab Results   Component Value Date    WBC 4 0 01/30/2019    HGB 12 9 01/30/2019    HCT 38 4 01/30/2019    MCV 90 6 01/30/2019     01/30/2019     Lab Results   Component Value Date    CHOL 224 (H) 01/15/2018     Lab Results   Component Value Date    TRIG 75 01/30/2019     Lab Results   Component Value Date    HDL 86 01/30/2019     Results for orders placed or performed in visit on 01/29/19   CBC   Result Value Ref Range    White Blood Cell Count 4 0 3 8 - 10 8 Thousand/uL    Red Blood Cell Count 4 24 3 80 - 5 10 Million/uL    Hemoglobin 12 9 11 7 - 15 5 g/dL    HCT 38 4 35 0 - 45 0 %    MCV 90 6 80 0 - 100 0 fL    MCH 30 4 27 0 - 33 0 pg    MCHC 33 6 32 0 - 36 0 g/dL    RDW 12 4 11 0 - 15 0 %    Platelet Count 450 709 - 400 Thousand/uL    SL AMB MPV 13 3 (H) 7 5 - 12 5 fL   Comprehensive metabolic panel   Result Value Ref Range    Glucose, Random 94 65 - 99 mg/dL    BUN 21 7 - 25 mg/dL    Creatinine 0 87 0 50 - 0 99 mg/dL    eGFR Non  69 > OR = 60 mL/min/1 73m2    eGFR  80 > OR = 60 mL/min/1 73m2    SL AMB BUN/CREATININE RATIO NOT APPLICABLE 6 - 22 (calc)    Sodium 140 135 - 146 mmol/L    Potassium 4 1 3 5 - 5 3 mmol/L    Chloride 106 98 - 110 mmol/L    CO2 29 20 - 32 mmol/L    SL AMB CALCIUM 9 6 8 6 - 10 4 mg/dL    Protein, Total 7 2 6 1 - 8 1 g/dL    Albumin 4 1 3 6 - 5 1 g/dL    Globulin 3 1 1 9 - 3 7 g/dL (calc)    Albumin/Globulin Ratio 1 3 1 0 - 2 5 (calc)    TOTAL BILIRUBIN 0 6 0 2 - 1 2 mg/dL    Alkaline Phosphatase 69 33 - 130 U/L    AST 20 10 - 35 U/L    ALT 17 6 - 29 U/L   Urinalysis with microscopic   Result Value Ref Range    Color UA YELLOW YELLOW    Urine Appearance CLEAR CLEAR    Specific Gravity 1 021 1 001 - 1 035    Ph < OR = 5 0 5 0 - 8 0    Glucose, Urine NEGATIVE NEGATIVE    Bilirubin, Urine NEGATIVE NEGATIVE    Ketone, Urine NEGATIVE NEGATIVE    Blood, Urine NEGATIVE NEGATIVE    Protein, Urine NEGATIVE NEGATIVE    SL AMB NITRITES URINE, QUAL   NEGATIVE NEGATIVE    Leukocyte Esterase 2+ (A) NEGATIVE    SL AMB WBC, URINE 20-40 (A) < OR = 5 /HPF    RBC, Urine 0-2 < OR = 2 /HPF    Squamous Epithelial Cells 0-5 < OR = 5 /HPF    Bacteria, UA FEW (A) NONE SEEN /HPF    Hyaline Casts NONE SEEN NONE SEEN /LPF   Lipid panel   Result Value Ref Range    Total Cholesterol 207 (H) <200 mg/dL    HDL 86 >50 mg/dL    Triglycerides 75 <150 mg/dL    LDL Direct 105 (H) mg/dL (calc)    Chol HDLC Ratio 2 4 <5 0 (calc)    Non-HDL Cholesterol 121 <130 mg/dL (calc)         ALLERGIES:  No Known Allergies    Current Medications     Current Outpatient Medications   Medication Sig Dispense Refill    atorvastatin (LIPITOR) 40 mg tablet TAKE 1 TABLET BY MOUTH EVERY DAY 90 tablet 1    butalbital-acetaminophen-caffeine (FIORICET,ESGIC) -40 mg per tablet Take 1 tablet by mouth every 4 (four) hours as needed for headaches 20 tablet 0    escitalopram (LEXAPRO) 10 mg tablet Take 1 tablet (10 mg total) by mouth daily 30 tablet 3    Multiple Vitamins-Minerals (MULTIVITAL PO) Take 1 tablet by mouth daily      pantoprazole (PROTONIX) 20 mg tablet Take 1 tablet (20 mg total) by mouth daily 30 tablet 3    propranolol (INDERAL LA) 120 mg 24 hr capsule TAKE 1 CAPSULE BY MOUTH EVERY DAY 90 capsule 3    SUMAtriptan (IMITREX) 25 mg tablet Take 1 tablet (25 mg total) by mouth once as needed for migraine for up to 1 dose 9 tablet 3    triamcinolone (KENALOG) 0 1 % cream Apply topically 2 (two) times a day X 14 days 15 g 0     No current facility-administered medications for this visit            Health Maintenance     Health Maintenance   Topic Date Due    DXA SCAN  1950    CRC Screening: Colonoscopy  10/07/2019    INFLUENZA VACCINE  03/25/2020 (Originally 7/1/2019)    MAMMOGRAM  01/30/2020    Fall Risk  03/25/2020    Urinary Incontinence Screening  03/25/2020    Depression Screening PHQ  10/03/2020    BMI: Adult  10/03/2020    DTaP,Tdap,and Td Vaccines (2 - Td) 01/31/2024    Hepatitis C Screening  Completed    Pneumococcal Vaccine: 65+ Years  Completed    Pneumococcal Vaccine: Pediatrics (0 to 5 Years) and At-Risk Patients (6 to 59 Years)  Aged Out    HEPATITIS B VACCINES  Aged [employer] History   Administered Date(s) Administered    INFLUENZA 01/01/2006    Influenza Quadrivalent Preservative Free 3 years and older IM 10/17/2014, 10/01/2015    Influenza Split High Dose Preservative Free IM 01/09/2018    Influenza TIV (IM) 10/18/2011, 12/06/2013    Pneumococcal Conjugate 13-Valent 01/09/2018    Pneumococcal Polysaccharide PPV23 03/25/2019    Tdap 01/31/2014       Margarete Barthel, PA-C  10/3/2019 6:34 PM  [facility]

## 2019-10-03 NOTE — ASSESSMENT & PLAN NOTE
Improved with Lexapro  Continue with Fioricet or Imitrex occasionally a few times a month as needed

## 2019-10-23 ENCOUNTER — IMMUNIZATIONS (OUTPATIENT)
Dept: FAMILY MEDICINE CLINIC | Facility: CLINIC | Age: 69
End: 2019-10-23
Payer: COMMERCIAL

## 2019-10-23 DIAGNOSIS — Z23 FLU VACCINE NEED: Primary | ICD-10-CM

## 2019-10-23 PROCEDURE — 90662 IIV NO PRSV INCREASED AG IM: CPT

## 2019-10-23 PROCEDURE — 90471 IMMUNIZATION ADMIN: CPT

## 2019-11-15 DIAGNOSIS — G43.909 MIGRAINE WITHOUT STATUS MIGRAINOSUS, NOT INTRACTABLE, UNSPECIFIED MIGRAINE TYPE: ICD-10-CM

## 2019-11-15 RX ORDER — BUTALBITAL, ACETAMINOPHEN AND CAFFEINE 50; 325; 40 MG/1; MG/1; MG/1
1 TABLET ORAL EVERY 4 HOURS PRN
Qty: 20 TABLET | Refills: 0 | Status: SHIPPED | OUTPATIENT
Start: 2019-11-15 | End: 2020-03-06 | Stop reason: SDUPTHER

## 2019-11-20 DIAGNOSIS — K29.00 ACUTE GASTRITIS WITHOUT HEMORRHAGE, UNSPECIFIED GASTRITIS TYPE: ICD-10-CM

## 2019-11-20 NOTE — TELEPHONE ENCOUNTER
Patient was decreased to 20 mg daily last month  Please confirm that is working and she does not need the 40 mg that was requested by the pharmacy

## 2019-11-21 NOTE — TELEPHONE ENCOUNTER
Pt claims to be taking pantoprazole 40 mg 1-2x/week, and 20 mg the rest of the days  Pt will take 40 mg if she knows she's going out to dinner as well

## 2019-11-21 NOTE — TELEPHONE ENCOUNTER
Insurance is not going to pay for her to get both strengths  She could try taking a Pepcid prior to going out to eat if she thinks she needs a little extra help on those days and then stick with the 20 mg daily  Please see if she is okay with this

## 2019-11-22 RX ORDER — PANTOPRAZOLE SODIUM 40 MG/1
TABLET, DELAYED RELEASE ORAL
Qty: 90 TABLET | Refills: 1 | OUTPATIENT
Start: 2019-11-22

## 2019-11-22 RX ORDER — PANTOPRAZOLE SODIUM 20 MG/1
20 TABLET, DELAYED RELEASE ORAL DAILY
Qty: 90 TABLET | Refills: 3 | Status: SHIPPED | OUTPATIENT
Start: 2019-11-22 | End: 2021-02-03

## 2019-11-22 NOTE — TELEPHONE ENCOUNTER
Pt informed of recommendations and will try this until upcoming appt   Can you please discard protonix 40 mg?

## 2019-12-10 DIAGNOSIS — E78.5 HYPERLIPIDEMIA, UNSPECIFIED HYPERLIPIDEMIA TYPE: ICD-10-CM

## 2019-12-10 RX ORDER — ATORVASTATIN CALCIUM 40 MG/1
TABLET, FILM COATED ORAL
Qty: 90 TABLET | Refills: 1 | Status: SHIPPED | OUTPATIENT
Start: 2019-12-10 | End: 2020-06-12

## 2020-01-24 DIAGNOSIS — F41.9 ANXIETY: ICD-10-CM

## 2020-01-25 RX ORDER — ESCITALOPRAM OXALATE 10 MG/1
10 TABLET ORAL DAILY
Qty: 30 TABLET | Refills: 5 | Status: SHIPPED | OUTPATIENT
Start: 2020-01-25 | End: 2020-07-10

## 2020-02-06 ENCOUNTER — APPOINTMENT (OUTPATIENT)
Dept: RADIOLOGY | Facility: MEDICAL CENTER | Age: 70
End: 2020-02-06
Payer: COMMERCIAL

## 2020-02-06 ENCOUNTER — OFFICE VISIT (OUTPATIENT)
Dept: FAMILY MEDICINE CLINIC | Facility: CLINIC | Age: 70
End: 2020-02-06
Payer: COMMERCIAL

## 2020-02-06 VITALS
HEIGHT: 67 IN | SYSTOLIC BLOOD PRESSURE: 108 MMHG | WEIGHT: 149 LBS | OXYGEN SATURATION: 99 % | HEART RATE: 60 BPM | DIASTOLIC BLOOD PRESSURE: 70 MMHG | BODY MASS INDEX: 23.39 KG/M2

## 2020-02-06 DIAGNOSIS — M25.562 ACUTE PAIN OF LEFT KNEE: Primary | ICD-10-CM

## 2020-02-06 DIAGNOSIS — Z86.010 HX OF COLONIC POLYP: ICD-10-CM

## 2020-02-06 DIAGNOSIS — M25.562 ACUTE PAIN OF LEFT KNEE: ICD-10-CM

## 2020-02-06 PROCEDURE — 3078F DIAST BP <80 MM HG: CPT | Performed by: PHYSICIAN ASSISTANT

## 2020-02-06 PROCEDURE — 4040F PNEUMOC VAC/ADMIN/RCVD: CPT | Performed by: PHYSICIAN ASSISTANT

## 2020-02-06 PROCEDURE — 1160F RVW MEDS BY RX/DR IN RCRD: CPT | Performed by: PHYSICIAN ASSISTANT

## 2020-02-06 PROCEDURE — 1036F TOBACCO NON-USER: CPT | Performed by: PHYSICIAN ASSISTANT

## 2020-02-06 PROCEDURE — 3008F BODY MASS INDEX DOCD: CPT | Performed by: PHYSICIAN ASSISTANT

## 2020-02-06 PROCEDURE — 99213 OFFICE O/P EST LOW 20 MIN: CPT | Performed by: PHYSICIAN ASSISTANT

## 2020-02-06 PROCEDURE — 73564 X-RAY EXAM KNEE 4 OR MORE: CPT

## 2020-02-06 PROCEDURE — 3074F SYST BP LT 130 MM HG: CPT | Performed by: PHYSICIAN ASSISTANT

## 2020-02-06 NOTE — PATIENT INSTRUCTIONS
RICE (rest, ice 15 min 3 times a day, compression/ACE wrap, elevation - recliner or pillows) - use Tylenol as needed   Check xray of the knee, physical therapy if xray okay

## 2020-02-06 NOTE — PROGRESS NOTES
FAMILY PRACTICE ACUTE OFFICE VISIT  [facility]       NAME: You Desir  AGE: 81 y o  SEX: female       : 1950        MRN: [id number]    DATE: 2020  TIME: 3:37 PM    Assessment and Plan     Problem List Items Addressed This Visit     None      Visit Diagnoses     Acute pain of left knee    -  Primary    Likely ligament strain  Try RICE  Check x-ray  ACE wrap given - should help mild effusion  Tylenol for pain as needed  PT if x-ray okay and pain persists  Relevant Orders    XR knee 4+ vw left injury    Ambulatory referral to Physical Therapy    Hx of colonic polyp        Relevant Orders    Ambulatory referral to Gastroenterology        Patient Instructions   RICE (rest, ice 15 min 3 times a day, compression/ACE wrap, elevation - recliner or pillows) - use Tylenol as needed  Check xray of the knee, physical therapy if xray okay          Chief Complaint     Chief Complaint   Patient presents with    Knee Pain     left knee pain for a week       History of Present Illness   You Lay is a 81y o -year-old female who presents for left knee pain  Knee Pain    Injury mechanism: coming out of the car  The pain is present in the left knee (medial and now sometimes superior to patella)  The quality of the pain is described as aching (and feels like a tight band around the knee area - then with movement is sharp)  Associated symptoms include muscle weakness (slight)  Pertinent negatives include no numbness or tingling  She reports no foreign bodies present  She has tried acetaminophen and NSAIDs for the symptoms  The treatment provided no relief  Review of Systems   Review of Systems   Musculoskeletal: Positive for arthralgias (left knee) and joint swelling (left knee)  Skin: Negative for color change  Neurological: Positive for weakness (slight in the left thigh where pain radiates to at times)   Negative for tingling and numbness         Active Problem List     Patient Active Problem List   Diagnosis    Abnormal EKG    Aneurysm, cerebral    Benign neoplasm of colon    Breast tenderness in female    Neck pain    Chronic migraine    Hearing loss    Displacement of thoracic intervertebral disc without myelopathy    Hyperlipidemia    Irritable bowel syndrome    Levoscoliosis    Lightheadedness    Chronic low back pain    Memory difficulties    Migraine    Onychomycosis    Osteoarthritis of both hands    Palpitations    Skin lesions    Anxiety    Gastritis         Social History:  Social History     Socioeconomic History    Marital status: /Civil Union     Spouse name: Not on file    Number of children: Not on file    Years of education: Not on file    Highest education level: Not on file   Occupational History    Not on file   Social Needs    Financial resource strain: Not on file    Food insecurity:     Worry: Not on file     Inability: Not on file    Transportation needs:     Medical: Not on file     Non-medical: Not on file   Tobacco Use    Smoking status: Former Smoker     Last attempt to quit:      Years since quittin 1    Smokeless tobacco: Never Used   Substance and Sexual Activity    Alcohol use: Yes     Frequency: Monthly or less     Drinks per session: 1 or 2    Drug use: No    Sexual activity: Not on file   Lifestyle    Physical activity:     Days per week: Not on file     Minutes per session: Not on file    Stress: Not on file   Relationships    Social connections:     Talks on phone: Not on file     Gets together: Not on file     Attends [employer] service: Not on file     Active member of club or organization: Not on file     Attends meetings of clubs or organizations: Not on file     Relationship status: Not on file    Intimate partner violence:     Fear of current or ex partner: Not on file     Emotionally abused: Not on file     Physically abused: Not on file Forced sexual activity: Not on file   Other Topics Concern    Not on file   Social History Narrative    Not on file       Objective     Vitals:    02/06/20 1500   BP: 108/70   BP Location: Left arm   Patient Position: Sitting   Cuff Size: Large   Pulse: 60   SpO2: 99%   Weight: 67 6 kg (149 lb)   Height: 5' 6 9" (1 699 m)     Wt Readings from Last 3 Encounters:   02/06/20 67 6 kg (149 lb)   10/03/19 64 9 kg (143 lb)   07/30/19 64 6 kg (142 lb 8 oz)       Physical Exam   Constitutional: She appears well-developed and well-nourished  No distress  Pulmonary/Chest: Effort normal    Musculoskeletal:   No erythema noted of either knee but does has mild swelling/effusion of the left medial knee, negative Sherita's bilaterally, negative anterior and posterior drawer bilaterally, negative medial and lateral distraction bilaterally, negative Apley compression and distraction bilaterally     Neurological: No sensory deficit  5/5 strength in LE bilaterally except with left hip flexion being about 4+/5   Psychiatric: She has a normal mood and affect  Vitals reviewed         Pertinent Laboratory/Diagnostic Studies:  Results for orders placed or performed in visit on 01/29/19   CBC   Result Value Ref Range    White Blood Cell Count 4 0 3 8 - 10 8 Thousand/uL    Red Blood Cell Count 4 24 3 80 - 5 10 Million/uL    Hemoglobin 12 9 11 7 - 15 5 g/dL    HCT 38 4 35 0 - 45 0 %    MCV 90 6 80 0 - 100 0 fL    MCH 30 4 27 0 - 33 0 pg    MCHC 33 6 32 0 - 36 0 g/dL    RDW 12 4 11 0 - 15 0 %    Platelet Count 085 921 - 400 Thousand/uL    SL AMB MPV 13 3 (H) 7 5 - 12 5 fL   Comprehensive metabolic panel   Result Value Ref Range    Glucose, Random 94 65 - 99 mg/dL    BUN 21 7 - 25 mg/dL    Creatinine 0 87 0 50 - 0 99 mg/dL    eGFR Non  69 > OR = 60 mL/min/1 73m2    eGFR  80 > OR = 60 mL/min/1 73m2    SL AMB BUN/CREATININE RATIO NOT APPLICABLE 6 - 22 (calc)    Sodium 140 135 - 146 mmol/L    Potassium 4 1 3 5 - 5 3 mmol/L    Chloride 106 98 - 110 mmol/L    CO2 29 20 - 32 mmol/L    SL AMB CALCIUM 9 6 8 6 - 10 4 mg/dL    Protein, Total 7 2 6 1 - 8 1 g/dL    Albumin 4 1 3 6 - 5 1 g/dL    Globulin 3 1 1 9 - 3 7 g/dL (calc)    Albumin/Globulin Ratio 1 3 1 0 - 2 5 (calc)    TOTAL BILIRUBIN 0 6 0 2 - 1 2 mg/dL    Alkaline Phosphatase 69 33 - 130 U/L    AST 20 10 - 35 U/L    ALT 17 6 - 29 U/L   Urinalysis with microscopic   Result Value Ref Range    Color UA YELLOW YELLOW    Urine Appearance CLEAR CLEAR    Specific Gravity 1 021 1 001 - 1 035    Ph < OR = 5 0 5 0 - 8 0    Glucose, Urine NEGATIVE NEGATIVE    Bilirubin, Urine NEGATIVE NEGATIVE    Ketone, Urine NEGATIVE NEGATIVE    Blood, Urine NEGATIVE NEGATIVE    Protein, Urine NEGATIVE NEGATIVE    SL AMB NITRITES URINE, QUAL   NEGATIVE NEGATIVE    Leukocyte Esterase 2+ (A) NEGATIVE    SL AMB WBC, URINE 20-40 (A) < OR = 5 /HPF    RBC, Urine 0-2 < OR = 2 /HPF    Squamous Epithelial Cells 0-5 < OR = 5 /HPF    Bacteria, UA FEW (A) NONE SEEN /HPF    Hyaline Casts NONE SEEN NONE SEEN /LPF   Lipid panel   Result Value Ref Range    Total Cholesterol 207 (H) <200 mg/dL    HDL 86 >50 mg/dL    Triglycerides 75 <150 mg/dL    LDL Direct 105 (H) mg/dL (calc)    Chol HDLC Ratio 2 4 <5 0 (calc)    Non-HDL Cholesterol 121 <130 mg/dL (calc)       Orders Placed This Encounter   Procedures    XR knee 4+ vw left injury    Ambulatory referral to Gastroenterology    Ambulatory referral to Physical Therapy       ALLERGIES:  No Known Allergies    Current Medications     Current Outpatient Medications   Medication Sig Dispense Refill    atorvastatin (LIPITOR) 40 mg tablet TAKE 1 TABLET BY MOUTH EVERY DAY 90 tablet 1    escitalopram (LEXAPRO) 10 mg tablet Take 1 tablet (10 mg total) by mouth daily 30 tablet 5    Multiple Vitamins-Minerals (MULTIVITAL PO) Take 1 tablet by mouth daily      pantoprazole (PROTONIX) 20 mg tablet Take 1 tablet (20 mg total) by mouth daily 90 tablet 3    propranolol (INDERAL LA) 120 mg 24 hr capsule TAKE 1 CAPSULE BY MOUTH EVERY DAY 90 capsule 3    SUMAtriptan (IMITREX) 25 mg tablet Take 1 tablet (25 mg total) by mouth once as needed for migraine for up to 1 dose 9 tablet 3    butalbital-acetaminophen-caffeine (FIORICET,ESGIC) -40 mg per tablet Take 1 tablet by mouth every 4 (four) hours as needed for headaches 20 tablet 0    triamcinolone (KENALOG) 0 1 % cream Apply topically 2 (two) times a day X 14 days (Patient not taking: Reported on 2/6/2020) 15 g 0     No current facility-administered medications for this visit            Armida De Dios PA-C  2/6/2020 3:37 PM  [facility]

## 2020-03-06 DIAGNOSIS — G43.909 MIGRAINE WITHOUT STATUS MIGRAINOSUS, NOT INTRACTABLE, UNSPECIFIED MIGRAINE TYPE: ICD-10-CM

## 2020-03-07 RX ORDER — BUTALBITAL, ACETAMINOPHEN AND CAFFEINE 50; 325; 40 MG/1; MG/1; MG/1
1 TABLET ORAL EVERY 4 HOURS PRN
Qty: 20 TABLET | Refills: 0 | Status: SHIPPED | OUTPATIENT
Start: 2020-03-07 | End: 2020-06-03 | Stop reason: SDUPTHER

## 2020-06-03 DIAGNOSIS — G43.909 MIGRAINE WITHOUT STATUS MIGRAINOSUS, NOT INTRACTABLE, UNSPECIFIED MIGRAINE TYPE: ICD-10-CM

## 2020-06-03 DIAGNOSIS — G43.701 CHRONIC MIGRAINE WITHOUT AURA WITH STATUS MIGRAINOSUS, NOT INTRACTABLE: ICD-10-CM

## 2020-06-04 RX ORDER — BUTALBITAL, ACETAMINOPHEN AND CAFFEINE 50; 325; 40 MG/1; MG/1; MG/1
1 TABLET ORAL EVERY 4 HOURS PRN
Qty: 20 TABLET | Refills: 0 | Status: SHIPPED | OUTPATIENT
Start: 2020-06-04 | End: 2020-10-21 | Stop reason: SDUPTHER

## 2020-06-04 RX ORDER — SUMATRIPTAN 25 MG/1
25 TABLET, FILM COATED ORAL ONCE AS NEEDED
Qty: 9 TABLET | Refills: 0 | Status: SHIPPED | OUTPATIENT
Start: 2020-06-04 | End: 2020-06-29

## 2020-06-11 DIAGNOSIS — E78.5 HYPERLIPIDEMIA, UNSPECIFIED HYPERLIPIDEMIA TYPE: ICD-10-CM

## 2020-06-12 RX ORDER — ATORVASTATIN CALCIUM 40 MG/1
TABLET, FILM COATED ORAL
Qty: 90 TABLET | Refills: 1 | Status: SHIPPED | OUTPATIENT
Start: 2020-06-12 | End: 2020-10-27 | Stop reason: SDUPTHER

## 2020-06-28 DIAGNOSIS — G43.701 CHRONIC MIGRAINE WITHOUT AURA WITH STATUS MIGRAINOSUS, NOT INTRACTABLE: ICD-10-CM

## 2020-06-29 RX ORDER — SUMATRIPTAN 25 MG/1
25 TABLET, FILM COATED ORAL ONCE AS NEEDED
Qty: 9 TABLET | Refills: 3 | Status: SHIPPED | OUTPATIENT
Start: 2020-06-29 | End: 2021-07-26 | Stop reason: SDUPTHER

## 2020-07-05 DIAGNOSIS — I10 ESSENTIAL HYPERTENSION: ICD-10-CM

## 2020-07-06 RX ORDER — PROPRANOLOL HYDROCHLORIDE 120 MG/1
CAPSULE, EXTENDED RELEASE ORAL
Qty: 90 CAPSULE | Refills: 0 | Status: SHIPPED | OUTPATIENT
Start: 2020-07-06 | End: 2020-10-04

## 2020-07-10 DIAGNOSIS — F41.9 ANXIETY: ICD-10-CM

## 2020-07-10 RX ORDER — ESCITALOPRAM OXALATE 10 MG/1
TABLET ORAL
Qty: 90 TABLET | Refills: 0 | Status: SHIPPED | OUTPATIENT
Start: 2020-07-10 | End: 2020-10-04

## 2020-07-14 ENCOUNTER — TELEPHONE (OUTPATIENT)
Dept: FAMILY MEDICINE CLINIC | Facility: CLINIC | Age: 70
End: 2020-07-14

## 2020-10-04 DIAGNOSIS — F41.9 ANXIETY: ICD-10-CM

## 2020-10-04 DIAGNOSIS — I10 ESSENTIAL HYPERTENSION: ICD-10-CM

## 2020-10-04 RX ORDER — PROPRANOLOL HYDROCHLORIDE 120 MG/1
CAPSULE, EXTENDED RELEASE ORAL
Qty: 90 CAPSULE | Refills: 0 | Status: SHIPPED | OUTPATIENT
Start: 2020-10-04 | End: 2021-01-05

## 2020-10-04 RX ORDER — ESCITALOPRAM OXALATE 10 MG/1
TABLET ORAL
Qty: 90 TABLET | Refills: 0 | Status: SHIPPED | OUTPATIENT
Start: 2020-10-04 | End: 2021-01-05

## 2020-10-21 ENCOUNTER — OFFICE VISIT (OUTPATIENT)
Dept: FAMILY MEDICINE CLINIC | Facility: CLINIC | Age: 70
End: 2020-10-21
Payer: COMMERCIAL

## 2020-10-21 VITALS
DIASTOLIC BLOOD PRESSURE: 72 MMHG | BODY MASS INDEX: 24.51 KG/M2 | SYSTOLIC BLOOD PRESSURE: 116 MMHG | TEMPERATURE: 97.6 F | WEIGHT: 156.13 LBS | HEIGHT: 67 IN | HEART RATE: 72 BPM

## 2020-10-21 DIAGNOSIS — Z00.00 ANNUAL PHYSICAL EXAM: Primary | ICD-10-CM

## 2020-10-21 DIAGNOSIS — Z13.1 DIABETES MELLITUS SCREENING: ICD-10-CM

## 2020-10-21 DIAGNOSIS — H91.93 BILATERAL HEARING LOSS, UNSPECIFIED HEARING LOSS TYPE: ICD-10-CM

## 2020-10-21 DIAGNOSIS — K29.70 GASTRITIS, PRESENCE OF BLEEDING UNSPECIFIED, UNSPECIFIED CHRONICITY, UNSPECIFIED GASTRITIS TYPE: ICD-10-CM

## 2020-10-21 DIAGNOSIS — Z78.0 POST-MENOPAUSAL: ICD-10-CM

## 2020-10-21 DIAGNOSIS — Z13.220 LIPID SCREENING: ICD-10-CM

## 2020-10-21 DIAGNOSIS — E78.5 HYPERLIPIDEMIA, UNSPECIFIED HYPERLIPIDEMIA TYPE: ICD-10-CM

## 2020-10-21 DIAGNOSIS — Z23 FLU VACCINE NEED: ICD-10-CM

## 2020-10-21 DIAGNOSIS — Z12.11 COLON CANCER SCREENING: ICD-10-CM

## 2020-10-21 DIAGNOSIS — Z12.31 ENCOUNTER FOR SCREENING MAMMOGRAM FOR BREAST CANCER: ICD-10-CM

## 2020-10-21 DIAGNOSIS — F41.9 ANXIETY: ICD-10-CM

## 2020-10-21 DIAGNOSIS — G43.909 MIGRAINE WITHOUT STATUS MIGRAINOSUS, NOT INTRACTABLE, UNSPECIFIED MIGRAINE TYPE: ICD-10-CM

## 2020-10-21 PROCEDURE — 90662 IIV NO PRSV INCREASED AG IM: CPT

## 2020-10-21 PROCEDURE — 90471 IMMUNIZATION ADMIN: CPT

## 2020-10-21 PROCEDURE — 99397 PER PM REEVAL EST PAT 65+ YR: CPT | Performed by: PHYSICIAN ASSISTANT

## 2020-10-21 RX ORDER — BUTALBITAL, ACETAMINOPHEN AND CAFFEINE 50; 325; 40 MG/1; MG/1; MG/1
1 TABLET ORAL EVERY 4 HOURS PRN
Qty: 20 TABLET | Refills: 0 | Status: SHIPPED | OUTPATIENT
Start: 2020-10-21 | End: 2021-02-03 | Stop reason: SDUPTHER

## 2020-10-26 DIAGNOSIS — E78.5 HYPERLIPIDEMIA, UNSPECIFIED HYPERLIPIDEMIA TYPE: ICD-10-CM

## 2020-10-27 LAB
ALBUMIN SERPL-MCNC: 4 G/DL (ref 3.6–5.1)
ALBUMIN/GLOB SERPL: 1.3 (CALC) (ref 1–2.5)
ALP SERPL-CCNC: 63 U/L (ref 37–153)
ALT SERPL-CCNC: 16 U/L (ref 6–29)
AST SERPL-CCNC: 17 U/L (ref 10–35)
BASOPHILS # BLD AUTO: 80 CELLS/UL (ref 0–200)
BASOPHILS NFR BLD AUTO: 1.6 %
BILIRUB SERPL-MCNC: 0.6 MG/DL (ref 0.2–1.2)
BUN SERPL-MCNC: 17 MG/DL (ref 7–25)
BUN/CREAT SERPL: NORMAL (CALC) (ref 6–22)
CALCIUM SERPL-MCNC: 9.4 MG/DL (ref 8.6–10.4)
CHLORIDE SERPL-SCNC: 106 MMOL/L (ref 98–110)
CHOLEST SERPL-MCNC: 210 MG/DL
CHOLEST/HDLC SERPL: 2.6 (CALC)
CO2 SERPL-SCNC: 31 MMOL/L (ref 20–32)
CREAT SERPL-MCNC: 0.83 MG/DL (ref 0.5–0.99)
EOSINOPHIL # BLD AUTO: 240 CELLS/UL (ref 15–500)
EOSINOPHIL NFR BLD AUTO: 4.8 %
ERYTHROCYTE [DISTWIDTH] IN BLOOD BY AUTOMATED COUNT: 12.6 % (ref 11–15)
GLOBULIN SER CALC-MCNC: 3 G/DL (CALC) (ref 1.9–3.7)
GLUCOSE SERPL-MCNC: 97 MG/DL (ref 65–99)
HCT VFR BLD AUTO: 39.5 % (ref 35–45)
HDLC SERPL-MCNC: 81 MG/DL
HGB BLD-MCNC: 12.9 G/DL (ref 11.7–15.5)
LDLC SERPL CALC-MCNC: 113 MG/DL (CALC)
LYMPHOCYTES # BLD AUTO: 1380 CELLS/UL (ref 850–3900)
LYMPHOCYTES NFR BLD AUTO: 27.6 %
MCH RBC QN AUTO: 30.3 PG (ref 27–33)
MCHC RBC AUTO-ENTMCNC: 32.7 G/DL (ref 32–36)
MCV RBC AUTO: 92.7 FL (ref 80–100)
MONOCYTES # BLD AUTO: 410 CELLS/UL (ref 200–950)
MONOCYTES NFR BLD AUTO: 8.2 %
NEUTROPHILS # BLD AUTO: 2890 CELLS/UL (ref 1500–7800)
NEUTROPHILS NFR BLD AUTO: 57.8 %
NONHDLC SERPL-MCNC: 129 MG/DL (CALC)
PLATELET # BLD AUTO: 197 THOUSAND/UL (ref 140–400)
PMV BLD REES-ECKER: 12.5 FL (ref 7.5–12.5)
POTASSIUM SERPL-SCNC: 4.3 MMOL/L (ref 3.5–5.3)
PROT SERPL-MCNC: 7 G/DL (ref 6.1–8.1)
RBC # BLD AUTO: 4.26 MILLION/UL (ref 3.8–5.1)
SL AMB EGFR AFRICAN AMERICAN: 85 ML/MIN/1.73M2
SL AMB EGFR NON AFRICAN AMERICAN: 73 ML/MIN/1.73M2
SODIUM SERPL-SCNC: 141 MMOL/L (ref 135–146)
TRIGL SERPL-MCNC: 74 MG/DL
TSH SERPL-ACNC: 2.42 MIU/L (ref 0.4–4.5)
WBC # BLD AUTO: 5 THOUSAND/UL (ref 3.8–10.8)

## 2020-10-27 RX ORDER — ATORVASTATIN CALCIUM 80 MG/1
80 TABLET, FILM COATED ORAL DAILY
Qty: 90 TABLET | Refills: 1 | Status: SHIPPED | OUTPATIENT
Start: 2020-10-27 | End: 2020-12-18 | Stop reason: SDUPTHER

## 2020-12-17 DIAGNOSIS — E78.5 HYPERLIPIDEMIA, UNSPECIFIED HYPERLIPIDEMIA TYPE: ICD-10-CM

## 2020-12-17 RX ORDER — ATORVASTATIN CALCIUM 40 MG/1
TABLET, FILM COATED ORAL
Qty: 90 TABLET | Refills: 1 | OUTPATIENT
Start: 2020-12-17

## 2020-12-18 RX ORDER — ATORVASTATIN CALCIUM 80 MG/1
80 TABLET, FILM COATED ORAL DAILY
Qty: 90 TABLET | Refills: 1 | Status: SHIPPED | OUTPATIENT
Start: 2020-12-18 | End: 2021-07-22

## 2021-01-05 DIAGNOSIS — I10 ESSENTIAL HYPERTENSION: ICD-10-CM

## 2021-01-05 DIAGNOSIS — F41.9 ANXIETY: ICD-10-CM

## 2021-01-05 RX ORDER — PROPRANOLOL HYDROCHLORIDE 120 MG/1
CAPSULE, EXTENDED RELEASE ORAL
Qty: 90 CAPSULE | Refills: 3 | Status: SHIPPED | OUTPATIENT
Start: 2021-01-05 | End: 2021-12-22 | Stop reason: SDUPTHER

## 2021-01-05 RX ORDER — ESCITALOPRAM OXALATE 10 MG/1
TABLET ORAL
Qty: 90 TABLET | Refills: 3 | Status: SHIPPED | OUTPATIENT
Start: 2021-01-05 | End: 2021-11-22 | Stop reason: SDUPTHER

## 2021-01-29 ENCOUNTER — TELEPHONE (OUTPATIENT)
Dept: FAMILY MEDICINE CLINIC | Facility: CLINIC | Age: 71
End: 2021-01-29

## 2021-01-29 DIAGNOSIS — N17.9 AKI (ACUTE KIDNEY INJURY) (HCC): Primary | ICD-10-CM

## 2021-01-29 NOTE — TELEPHONE ENCOUNTER
Pt called in to the office and stated to me that she had blood work done at urgent care 1/28/21 when she fell and broke her nose  She stated to me why is her CMP abnormal with in a 3 month period her concern was with eGFR, non- [place] and eGFR,   Please advise  BRENNAN Camara pt has f/u with you on Wednesday 2/2/21    Thank you!

## 2021-01-29 NOTE — TELEPHONE ENCOUNTER
I agree that she had a significant change  Please make sure she has not been taking NSAIDs like ibuprofen or naproxen  These can be hard on kidneys  Please also make sure she is staying well-hydrated  Please ask her to recheck this Monday or Tuesday in prep for her appointment Wednesday  I have entered an order for a BMP to be done

## 2021-02-02 NOTE — PROGRESS NOTES
FAMILY PRACTICE OFFICE VISIT  [facility] - [facility] PRIMARY [facility]       NAME: You Desir  AGE: 78 y o  SEX: female       : 1950        MRN: [id number]    DATE: 2/3/2021  TIME: 11:03 AM    Assessment and Plan     Problem List Items Addressed This Visit        Digestive    Gastritis     Reports currently taking the pantoprazole about 3 times a week but having symptoms, especially with recent 10 pounds weight gain  She was encouraged to work on weight loss  She will return to daily use of pantoprazole for now  May need to redirect based upon repeat labs regarding kidney function  We discussed proper weaning process for a PPI (which she did not use previously when cutting back)  Cardiovascular and Mediastinum    Migraine     Stable  She will continue to use Tylenol, Fioricet and Imitrex as needed  Will continue to monitor  Refill provided for Fioricet  PDMP checked and appropriate  Relevant Medications    butalbital-acetaminophen-caffeine (FIORICET,ESGIC) -40 mg per tablet       Genitourinary    SHAY (acute kidney injury) ([facility] [address] )     Patient had significant increase in Cr on labs in ER compared to labs from October  Denies feeling dehydrated  She was encouraged to continue to avoid NSAIDs  We discussed possible link with PPIs  Will recheck with previously ordered labs and determine next step based upon today's Cr  Other    Overweight (BMI 25 0-29  9)     Patient notes that she gained about 10 pounds recently and feels this is triggering her GERD  She was encouraged to work on losing this additional weight  BMI Counseling: Body mass index is 25 26 kg/m²  The BMI is above normal  Nutrition recommendations include decreasing overall calorie intake  Exercise recommendations include exercising 3-5 times per week               Other Visit Diagnoses     Closed fracture of nasal bone with routine healing, subsequent encounter    -  Primary Appears to be healing well  She notes significant decrease in swelling  Was told at ER to see Plastic Surgery  No appt scheduled  Patient referred to Dr Enzo Sewell    Ambulatory referral to Plastic Surgery    Skin lesion of scalp        Possible seborrheic keratosis  Will refer to Dermatology for evaluation plus should have a full body check for skin cancer screening  Relevant Orders    Ambulatory referral to Dermatology    Acute pain of both knees        Reassured her that her XRs were normal besides minimal effusion  Exam is normal besides significant ecchymosis  Pain likely from contusions  Call if persists  Chief Complaint   No chief complaint on file  History of Present Illness   You Whitley is a 78y o -year-old female who presents for ER follow-up  Patient was seen at [facility] 6 days ago (1/28/2021) for fall when walking her dogs  She fell on concrete and hit her head  This caused a right eyebrow laceration and nasal fracture  CXR was normal  B/l Knee XR showed only minimal effusions bilaterally  CT head and maxillofacial showed left scalp lacerations and bilateral nondisplaced nasal fractures  She was given a Tdap during her visit to the ER  She reports that since the fall, she has been having intermittent headaches - feels like her usual headaches - notes that she uses the Tylenol - then tries Fioricet and Imitrex - does not prevent sleeping  She has a lot of pain in her nose off and on  She notes that she has noticed significant improvement over the last few days  She has some watery eyes as well  Notes that she has a skin lesion on left scalp - was told to see Derm    Of note, during her ER visit, labs showed a Cr of 1 37 and GFR 40  Her previous baseline was about Cr 0 85 and GFR 70  She did not feel dehydrated that day  Repeat BMP was not done          Review of Systems   Review of Systems   HENT:        Nasal pain   Musculoskeletal: Positive for arthralgias  Skin: Positive for wound  Neurological: Positive for headaches  Negative for dizziness  Active Problem List     Patient Active Problem List   Diagnosis    Abnormal EKG    Aneurysm, cerebral    Benign neoplasm of colon    Breast tenderness in female    Neck pain    Chronic migraine    Hearing loss    Displacement of thoracic intervertebral disc without myelopathy    Hyperlipidemia    Irritable bowel syndrome    Levoscoliosis    Lightheadedness    Chronic low back pain    Memory difficulties    Migraine    Onychomycosis    Osteoarthritis of both hands    Palpitations    Skin lesions    Anxiety    Gastritis    Overweight (BMI 25 0-29  9)    SHAY (acute kidney injury) ([facility] [address] )         Past Medical History:  History reviewed  No pertinent past medical history  Past Surgical History:  History reviewed  No pertinent surgical history      Family History:  Family History   Problem Relation Age of Onset    Heart failure Mother     Lung cancer Father     Multiple sclerosis Brother     Bipolar disorder Son     Lupus Daughter     Breast cancer Maternal Aunt     Asthma Son        Social History:  Social History     Socioeconomic History    Marital status: /Civil Union     Spouse name: Not on file    Number of children: Not on file    Years of education: Not on file    Highest education level: Not on file   Occupational History    Not on file   Social Needs    Financial resource strain: Not on file    Food insecurity     Worry: Not on file     Inability: Not on file   Monitor Backlinks needs     Medical: Not on file     Non-medical: Not on file   Tobacco Use    Smoking status: Former Smoker     Packs/day:  00     Years: 20 00     Pack years: 20      Quit date:      Years since quittin 1    Smokeless tobacco: Never Used   Substance and Sexual Activity    Alcohol use: Yes     Frequency: 2-3 times a week     Comment: 1/2 glass of wine at a time   DigitalScirocco use: Never    Sexual activity: Not on file   Lifestyle    Physical activity     Days per week: Not on file     Minutes per session: Not on file    Stress: Not on file   Relationships    Social connections     Talks on phone: Not on file     Gets together: Not on file     Attends Evangelical service: Not on file     Active member of club or organization: Not on file     Attends meetings of clubs or organizations: Not on file     Relationship status: Not on file    Intimate partner violence     Fear of current or ex partner: Not on file     Emotionally abused: Not on file     Physically abused: Not on file     Forced sexual activity: Not on file   Other Topics Concern    Not on file   Social History Narrative    Not on file       Objective     Vitals:    02/03/21 0927   BP: 110/70   BP Location: Left arm   Patient Position: Sitting   Cuff Size: Adult   Pulse: 64   Resp: 18   Temp: (!) 97 3 °F (36 3 °C)   TempSrc: Temporal   SpO2: 99%   Weight: 72 9 kg (160 lb 12 8 oz)   Height: 5' 6 9" (1 699 m)     Wt Readings from Last 3 Encounters:   02/03/21 72 9 kg (160 lb 12 8 oz)   10/21/20 70 8 kg (156 lb 2 oz)   02/06/20 67 6 kg (149 lb)       Physical Exam  Vitals signs reviewed  Constitutional:       General: She is not in acute distress  Appearance: Normal appearance  She is not ill-appearing  HENT:      Head: Normocephalic  Laceration (scabbed/healing over the bridge of the nose) present  Nose: Nasal deformity (appears like there might be slight deviation of tip of nose to her right) and congestion present  No septal deviation  Musculoskeletal:         General: Swelling (slight soft tissue swelling bilaterally) present        Comments: No erythema/effusion noted of either knee, some mild swelling and diffuse yellow ecchymosis over both knees and trailing down shins, negative Sherita's bilaterally, negative anterior and posterior drawer bilaterally, negative medial and lateral distraction bilaterally, negative Apley distraction bilaterally (compression not done due to anterior knee tenderness level bilaterally)   Skin:     Findings: Bruising (resolving but extensive over the knees bilaterally with trailing down shins faintly) and lesion (1 5 cm x 1 7 cm tan plaque on the left scalp - nontender - no bleeding/discharge) present  Neurological:      Mental Status: She is alert     Psychiatric:         Mood and Affect: Mood normal          Behavior: Behavior normal              Pertinent Laboratory/Diagnostic Studies:  Lab Results   Component Value Date    BUN 17 10/26/2020    CREATININE 0 83 10/26/2020    CALCIUM 9 4 10/26/2020     01/15/2018    K 4 3 10/26/2020    CO2 31 10/26/2020     10/26/2020     Lab Results   Component Value Date    ALT 16 10/26/2020    AST 17 10/26/2020    ALKPHOS 63 10/26/2020    BILITOT 0 4 01/15/2018       Lab Results   Component Value Date    WBC 5 0 10/26/2020    HGB 12 9 10/26/2020    HCT 39 5 10/26/2020    MCV 92 7 10/26/2020     10/26/2020     Lab Results   Component Value Date    CHOL 224 (H) 01/15/2018     Lab Results   Component Value Date    TRIG 74 10/26/2020     Lab Results   Component Value Date    HDL 81 10/26/2020     Lab Results   Component Value Date    LDLCALC 113 (H) 10/26/2020     Results for orders placed or performed in visit on 10/26/20   Lipid Panel with Direct LDL reflex   Result Value Ref Range    Total Cholesterol 210 (H) <200 mg/dL    HDL 81 > OR = 50 mg/dL    Triglycerides 74 <150 mg/dL    LDL Calculated 113 (H) mg/dL (calc)    Chol HDLC Ratio 2 6 <5 0 (calc)    Non-HDL Cholesterol 129 <130 mg/dL (calc)   Comprehensive metabolic panel   Result Value Ref Range    Glucose, Random 97 65 - 99 mg/dL    BUN 17 7 - 25 mg/dL    Creatinine 0 83 0 50 - 0 99 mg/dL    eGFR Non  73 > OR = 60 mL/min/1 73m2    eGFR  85 > OR = 60 mL/min/1 73m2    SL AMB BUN/CREATININE RATIO NOT APPLICABLE 6 - 22 (calc)    Sodium 141 135 - 146 mmol/L    Potassium 4 3 3 5 - 5 3 mmol/L    Chloride 106 98 - 110 mmol/L    CO2 31 20 - 32 mmol/L    Calcium 9 4 8 6 - 10 4 mg/dL    Protein, Total 7 0 6 1 - 8 1 g/dL    Albumin 4 0 3 6 - 5 1 g/dL    Globulin 3 0 1 9 - 3 7 g/dL (calc)    Albumin/Globulin Ratio 1 3 1 0 - 2 5 (calc)    TOTAL BILIRUBIN 0 6 0 2 - 1 2 mg/dL    Alkaline Phosphatase 63 37 - 153 U/L    AST 17 10 - 35 U/L    ALT 16 6 - 29 U/L   CBC and differential   Result Value Ref Range    White Blood Cell Count 5 0 3 8 - 10 8 Thousand/uL    Red Blood Cell Count 4 26 3 80 - 5 10 Million/uL    Hemoglobin 12 9 11 7 - 15 5 g/dL    HCT 39 5 35 0 - 45 0 %    MCV 92 7 80 0 - 100 0 fL    MCH 30 3 27 0 - 33 0 pg    MCHC 32 7 32 0 - 36 0 g/dL    RDW 12 6 11 0 - 15 0 %    Platelet Count 638 624 - 400 Thousand/uL    SL AMB MPV 12 5 7 5 - 12 5 fL    Neutrophils (Absolute) 2,890 1,500 - 7,800 cells/uL    Lymphocytes (Absolute) 1,380 850 - 3,900 cells/uL    Monocytes (Absolute) 410 200 - 950 cells/uL    Eosinophils (Absolute) 240 15 - 500 cells/uL    Basophils ABS 80 0 - 200 cells/uL    Neutrophils 57 8 %    Lymphocytes 27 6 %    Monocytes 8 2 %    Eosinophils 4 8 %    Basophils PCT 1 6 %   TSH, 3rd generation with Free T4 reflex   Result Value Ref Range    TSH W/RFX TO FREE T4 2 42 0 40 - 4 50 mIU/L         ALLERGIES:  No Known Allergies    Current Medications     Current Outpatient Medications   Medication Sig Dispense Refill    atorvastatin (LIPITOR) 80 mg tablet Take 1 tablet (80 mg total) by mouth daily 90 tablet 1    butalbital-acetaminophen-caffeine (FIORICET,ESGIC) -40 mg per tablet Take 1 tablet by mouth every 4 (four) hours as needed for headaches 20 tablet 0    escitalopram (LEXAPRO) 10 mg tablet TAKE 1 TABLET BY MOUTH EVERY DAY 90 tablet 3    Multiple Vitamins-Minerals (MULTIVITAL PO) Take 1 tablet by mouth daily      propranolol (INDERAL LA) 120 mg 24 hr capsule TAKE 1 CAPSULE BY MOUTH EVERY DAY 90 capsule 3    SUMAtriptan (IMITREX) 25 mg tablet Take 1 tablet (25 mg total) by mouth once as needed for migraine 9 tablet 3    pantoprazole (PROTONIX) 20 mg tablet TAKE 1 TABLET BY MOUTH EVERY DAY 90 tablet 1     No current facility-administered medications for this visit            Health Maintenance     Health Maintenance   Topic Date Due    DXA SCAN  1950    BMI: Followup Plan  12/01/1970    Colonoscopy Surveillance  10/07/2019    MAMMOGRAM  01/31/2021    Fall Risk  10/21/2021    Depression Screening PHQ  10/21/2021    Annual Physical  10/21/2021    BMI: Adult  02/03/2022    Colorectal Cancer Screening  10/07/2026    DTaP,Tdap,and Td Vaccines (3 - Td) 01/29/2031    Hepatitis C Screening  Completed    Pneumococcal Vaccine: 65+ Years  Completed    Influenza Vaccine  Completed    HIB Vaccine  Aged Out    Hepatitis B Vaccine  Aged Out    IPV Vaccine  Aged Out    Hepatitis A Vaccine  Aged Out    Meningococcal ACWY Vaccine  Aged Out    HPV Vaccine  Aged Out     Immunization History   Administered Date(s) Administered    INFLUENZA 01/01/2006    Influenza Quadrivalent Preservative Free 3 years and older IM 10/17/2014, 10/01/2015    Influenza Split High Dose Preservative Free IM 01/09/2018    Influenza, high dose seasonal 0 7 mL 10/23/2019, 10/21/2020    Influenza, seasonal, injectable 10/18/2011, 12/06/2013    Pneumococcal Conjugate 13-Valent 01/09/2018    Pneumococcal Polysaccharide PPV23 03/25/2019    Tdap 01/31/2014, 01/29/2021       George Washburn PA-C  2/3/2021 11:03 AM  [place] [id number] [facility]

## 2021-02-03 ENCOUNTER — APPOINTMENT (OUTPATIENT)
Dept: LAB | Facility: CLINIC | Age: 71
End: 2021-02-03
Payer: COMMERCIAL

## 2021-02-03 ENCOUNTER — OFFICE VISIT (OUTPATIENT)
Dept: FAMILY MEDICINE CLINIC | Facility: CLINIC | Age: 71
End: 2021-02-03
Payer: COMMERCIAL

## 2021-02-03 VITALS
WEIGHT: 160.8 LBS | HEIGHT: 67 IN | HEART RATE: 64 BPM | RESPIRATION RATE: 18 BRPM | DIASTOLIC BLOOD PRESSURE: 70 MMHG | SYSTOLIC BLOOD PRESSURE: 110 MMHG | OXYGEN SATURATION: 99 % | TEMPERATURE: 97.3 F | BODY MASS INDEX: 25.24 KG/M2

## 2021-02-03 DIAGNOSIS — M25.561 ACUTE PAIN OF BOTH KNEES: ICD-10-CM

## 2021-02-03 DIAGNOSIS — S02.2XXD CLOSED FRACTURE OF NASAL BONE WITH ROUTINE HEALING, SUBSEQUENT ENCOUNTER: Primary | ICD-10-CM

## 2021-02-03 DIAGNOSIS — M25.562 ACUTE PAIN OF BOTH KNEES: ICD-10-CM

## 2021-02-03 DIAGNOSIS — L98.9 SKIN LESION OF SCALP: ICD-10-CM

## 2021-02-03 DIAGNOSIS — E66.3 OVERWEIGHT (BMI 25.0-29.9): ICD-10-CM

## 2021-02-03 DIAGNOSIS — F41.9 ANXIETY: ICD-10-CM

## 2021-02-03 DIAGNOSIS — Z13.1 DIABETES MELLITUS SCREENING: ICD-10-CM

## 2021-02-03 DIAGNOSIS — N17.9 AKI (ACUTE KIDNEY INJURY) (HCC): ICD-10-CM

## 2021-02-03 DIAGNOSIS — K29.00 ACUTE GASTRITIS WITHOUT HEMORRHAGE, UNSPECIFIED GASTRITIS TYPE: ICD-10-CM

## 2021-02-03 DIAGNOSIS — G43.909 MIGRAINE WITHOUT STATUS MIGRAINOSUS, NOT INTRACTABLE, UNSPECIFIED MIGRAINE TYPE: ICD-10-CM

## 2021-02-03 DIAGNOSIS — K29.70 GASTRITIS, PRESENCE OF BLEEDING UNSPECIFIED, UNSPECIFIED CHRONICITY, UNSPECIFIED GASTRITIS TYPE: ICD-10-CM

## 2021-02-03 DIAGNOSIS — Z00.00 ANNUAL PHYSICAL EXAM: ICD-10-CM

## 2021-02-03 DIAGNOSIS — E78.5 HYPERLIPIDEMIA, UNSPECIFIED HYPERLIPIDEMIA TYPE: ICD-10-CM

## 2021-02-03 LAB
ALBUMIN SERPL BCP-MCNC: 3.7 G/DL (ref 3.5–5)
ALP SERPL-CCNC: 79 U/L (ref 46–116)
ALT SERPL W P-5'-P-CCNC: 33 U/L (ref 12–78)
ANION GAP SERPL CALCULATED.3IONS-SCNC: 3 MMOL/L (ref 4–13)
AST SERPL W P-5'-P-CCNC: 23 U/L (ref 5–45)
BASOPHILS # BLD AUTO: 0.08 THOUSANDS/ΜL (ref 0–0.1)
BASOPHILS NFR BLD AUTO: 1 % (ref 0–1)
BILIRUB SERPL-MCNC: 0.7 MG/DL (ref 0.2–1)
BUN SERPL-MCNC: 20 MG/DL (ref 5–25)
CALCIUM SERPL-MCNC: 10 MG/DL (ref 8.3–10.1)
CHLORIDE SERPL-SCNC: 110 MMOL/L (ref 100–108)
CHOLEST SERPL-MCNC: 215 MG/DL (ref 50–200)
CO2 SERPL-SCNC: 29 MMOL/L (ref 21–32)
CREAT SERPL-MCNC: 0.87 MG/DL (ref 0.6–1.3)
EOSINOPHIL # BLD AUTO: 0.27 THOUSAND/ΜL (ref 0–0.61)
EOSINOPHIL NFR BLD AUTO: 4 % (ref 0–6)
ERYTHROCYTE [DISTWIDTH] IN BLOOD BY AUTOMATED COUNT: 13.7 % (ref 11.6–15.1)
GFR SERPL CREATININE-BSD FRML MDRD: 69 ML/MIN/1.73SQ M
GLUCOSE P FAST SERPL-MCNC: 92 MG/DL (ref 65–99)
HCT VFR BLD AUTO: 41.6 % (ref 34.8–46.1)
HDLC SERPL-MCNC: 100 MG/DL
HGB BLD-MCNC: 12.9 G/DL (ref 11.5–15.4)
IMM GRANULOCYTES # BLD AUTO: 0.01 THOUSAND/UL (ref 0–0.2)
IMM GRANULOCYTES NFR BLD AUTO: 0 % (ref 0–2)
LDLC SERPL CALC-MCNC: 100 MG/DL (ref 0–100)
LYMPHOCYTES # BLD AUTO: 1.3 THOUSANDS/ΜL (ref 0.6–4.47)
LYMPHOCYTES NFR BLD AUTO: 21 % (ref 14–44)
MCH RBC QN AUTO: 29.9 PG (ref 26.8–34.3)
MCHC RBC AUTO-ENTMCNC: 31 G/DL (ref 31.4–37.4)
MCV RBC AUTO: 97 FL (ref 82–98)
MONOCYTES # BLD AUTO: 0.49 THOUSAND/ΜL (ref 0.17–1.22)
MONOCYTES NFR BLD AUTO: 8 % (ref 4–12)
NEUTROPHILS # BLD AUTO: 4.08 THOUSANDS/ΜL (ref 1.85–7.62)
NEUTS SEG NFR BLD AUTO: 66 % (ref 43–75)
NRBC BLD AUTO-RTO: 0 /100 WBCS
PLATELET # BLD AUTO: 194 THOUSANDS/UL (ref 149–390)
PMV BLD AUTO: 12.8 FL (ref 8.9–12.7)
POTASSIUM SERPL-SCNC: 4 MMOL/L (ref 3.5–5.3)
PROT SERPL-MCNC: 8.3 G/DL (ref 6.4–8.2)
RBC # BLD AUTO: 4.31 MILLION/UL (ref 3.81–5.12)
SODIUM SERPL-SCNC: 142 MMOL/L (ref 136–145)
TRIGL SERPL-MCNC: 77 MG/DL
TSH SERPL DL<=0.05 MIU/L-ACNC: 2.95 UIU/ML (ref 0.36–3.74)
WBC # BLD AUTO: 6.23 THOUSAND/UL (ref 4.31–10.16)

## 2021-02-03 PROCEDURE — 1160F RVW MEDS BY RX/DR IN RCRD: CPT | Performed by: PHYSICIAN ASSISTANT

## 2021-02-03 PROCEDURE — 3008F BODY MASS INDEX DOCD: CPT | Performed by: PHYSICIAN ASSISTANT

## 2021-02-03 PROCEDURE — 99214 OFFICE O/P EST MOD 30 MIN: CPT | Performed by: PHYSICIAN ASSISTANT

## 2021-02-03 PROCEDURE — 85025 COMPLETE CBC W/AUTO DIFF WBC: CPT

## 2021-02-03 PROCEDURE — 36415 COLL VENOUS BLD VENIPUNCTURE: CPT

## 2021-02-03 PROCEDURE — 80053 COMPREHEN METABOLIC PANEL: CPT

## 2021-02-03 PROCEDURE — 3725F SCREEN DEPRESSION PERFORMED: CPT | Performed by: PHYSICIAN ASSISTANT

## 2021-02-03 PROCEDURE — 80061 LIPID PANEL: CPT

## 2021-02-03 PROCEDURE — 84443 ASSAY THYROID STIM HORMONE: CPT

## 2021-02-03 PROCEDURE — 1036F TOBACCO NON-USER: CPT | Performed by: PHYSICIAN ASSISTANT

## 2021-02-03 RX ORDER — PANTOPRAZOLE SODIUM 20 MG/1
TABLET, DELAYED RELEASE ORAL
Qty: 90 TABLET | Refills: 1 | Status: SHIPPED | OUTPATIENT
Start: 2021-02-03 | End: 2021-07-28

## 2021-02-03 RX ORDER — BUTALBITAL, ACETAMINOPHEN AND CAFFEINE 50; 325; 40 MG/1; MG/1; MG/1
1 TABLET ORAL EVERY 4 HOURS PRN
Qty: 20 TABLET | Refills: 0 | Status: SHIPPED | OUTPATIENT
Start: 2021-02-03 | End: 2021-05-26 | Stop reason: SDUPTHER

## 2021-02-03 NOTE — ASSESSMENT & PLAN NOTE
Stable  She will continue to use Tylenol, Fioricet and Imitrex as needed  Will continue to monitor  Refill provided for Fioricet  PDMP checked and appropriate

## 2021-02-03 NOTE — ASSESSMENT & PLAN NOTE
Patient notes that she gained about 10 pounds recently and feels this is triggering her GERD  She was encouraged to work on losing this additional weight  BMI Counseling: Body mass index is 25 26 kg/m²  The BMI is above normal  Nutrition recommendations include decreasing overall calorie intake  Exercise recommendations include exercising 3-5 times per week

## 2021-02-03 NOTE — ASSESSMENT & PLAN NOTE
Patient had significant increase in Cr on labs in ER compared to labs from October  Denies feeling dehydrated  She was encouraged to continue to avoid NSAIDs  We discussed possible link with PPIs  Will recheck with previously ordered labs and determine next step based upon today's Cr

## 2021-02-03 NOTE — ASSESSMENT & PLAN NOTE
Reports currently taking the pantoprazole about 3 times a week but having symptoms, especially with recent 10 pounds weight gain  She was encouraged to work on weight loss  She will return to daily use of pantoprazole for now  May need to redirect based upon repeat labs regarding kidney function  We discussed proper weaning process for a PPI (which she did not use previously when cutting back)

## 2021-02-04 ENCOUNTER — VBI (OUTPATIENT)
Dept: ADMINISTRATIVE | Facility: OTHER | Age: 71
End: 2021-02-04

## 2021-02-04 NOTE — TELEPHONE ENCOUNTER
"Chief Complaint   Patient presents with   • Dental Pain     L side x 2-3 months       Denies any discharge or swelling. States he found a big piece of a filling. Doesn't have a dentist yet, waiting to get insurance from work.    /87 mmHg  Pulse 73  Temp(Src) 36.7 °C (98.1 °F)  Resp 16  Ht 1.854 m (6' 1\")  Wt 95 kg (209 lb 7 oz)  BMI 27.64 kg/m2  SpO2 98%      Pt Informed regarding triage process and verbalized understanding to inform triage tech or RN for any changes in condition.  Placed in lobby.    " 02/04/21 8:47 AM     See documentation in the [employer] SmartForm       Khari Arellano

## 2021-03-10 DIAGNOSIS — Z23 ENCOUNTER FOR IMMUNIZATION: ICD-10-CM

## 2021-05-03 ENCOUNTER — OFFICE VISIT (OUTPATIENT)
Dept: FAMILY MEDICINE CLINIC | Facility: CLINIC | Age: 71
End: 2021-05-03
Payer: COMMERCIAL

## 2021-05-03 VITALS
WEIGHT: 162.4 LBS | HEART RATE: 61 BPM | BODY MASS INDEX: 25.49 KG/M2 | DIASTOLIC BLOOD PRESSURE: 88 MMHG | HEIGHT: 67 IN | OXYGEN SATURATION: 98 % | SYSTOLIC BLOOD PRESSURE: 138 MMHG | TEMPERATURE: 97.5 F

## 2021-05-03 DIAGNOSIS — M54.42 CHRONIC MIDLINE LOW BACK PAIN WITH LEFT-SIDED SCIATICA: Primary | ICD-10-CM

## 2021-05-03 DIAGNOSIS — G89.29 CHRONIC MIDLINE LOW BACK PAIN WITH LEFT-SIDED SCIATICA: Primary | ICD-10-CM

## 2021-05-03 PROCEDURE — 99213 OFFICE O/P EST LOW 20 MIN: CPT | Performed by: PHYSICIAN ASSISTANT

## 2021-05-03 RX ORDER — LIFITEGRAST 50 MG/ML
SOLUTION/ DROPS OPHTHALMIC
COMMUNITY
Start: 2021-03-05 | End: 2021-07-29 | Stop reason: ALTCHOICE

## 2021-05-03 RX ORDER — IBUPROFEN 800 MG/1
800 TABLET ORAL EVERY 8 HOURS PRN
Qty: 60 TABLET | Refills: 0 | Status: SHIPPED | OUTPATIENT
Start: 2021-05-03 | End: 2021-11-22 | Stop reason: ALTCHOICE

## 2021-05-03 RX ORDER — GABAPENTIN 100 MG/1
100 CAPSULE ORAL
Qty: 90 CAPSULE | Refills: 1 | Status: SHIPPED | OUTPATIENT
Start: 2021-05-03 | End: 2021-07-01 | Stop reason: SDUPTHER

## 2021-05-03 NOTE — ASSESSMENT & PLAN NOTE
Patient notes continued back pain with new left leg pain since her fall about 3 months ago  We reviewed that this leg pain is likely radiculopathy  She had degenerative changes with disc bulging in 10/2017 MRI  We discussed options of taking ibuprofen 800 mg up to every 8 hours with food, trying gabapentin 100 mg (with ability to increase to 300 mg) at bedtime and starting physical therapy vs  Ordering an MRI of lumbar spine repeated  She would like to try meds and PT  She will return in 1 month for a recheck  She should call with concerns in the interim  If not improving, I would recommend prednisone taper and MRI

## 2021-05-03 NOTE — PROGRESS NOTES
FAMILY PRACTICE ACUTE OFFICE VISIT  [facility] - [facility]IMARY [facility]       NAME: You Desir  AGE: 78 y o  SEX: female       : 1950        MRN: [id number]    DATE: 5/3/2021  TIME: 5:48 PM    Assessment and Plan     Problem List Items Addressed This Visit        Other    Chronic low back pain - Primary     Patient notes continued back pain with new left leg pain since her fall about 3 months ago  We reviewed that this leg pain is likely radiculopathy  She had degenerative changes with disc bulging in 10/2017 MRI  We discussed options of taking ibuprofen 800 mg up to every 8 hours with food, trying gabapentin 100 mg (with ability to increase to 300 mg) at bedtime and starting physical therapy vs  Ordering an MRI of lumbar spine repeated  She would like to try meds and PT  She will return in 1 month for a recheck  She should call with concerns in the interim  If not improving, I would recommend prednisone taper and MRI  Relevant Medications    ibuprofen (MOTRIN) 800 mg tablet    gabapentin (NEURONTIN) 100 mg capsule    Other Relevant Orders    Ambulatory referral to Physical Therapy          Chronic low back pain  Patient notes continued back pain with new left leg pain since her fall about 3 months ago  We reviewed that this leg pain is likely radiculopathy  She had degenerative changes with disc bulging in 10/2017 MRI  We discussed options of taking ibuprofen 800 mg up to every 8 hours with food, trying gabapentin 100 mg (with ability to increase to 300 mg) at bedtime and starting physical therapy vs  Ordering an MRI of lumbar spine repeated  She would like to try meds and PT  She will return in 1 month for a recheck  She should call with concerns in the interim  If not improving, I would recommend prednisone taper and MRI             Chief Complaint     Chief Complaint   Patient presents with    Leg Pain     left        History of Present Illness   You Layo Velasquez is a 78y o -year-old female who presents for leg pain  Patient notes that since falling on the front porch walking her dog she has had left leg pain - getting worse since then and causing trouble sleeping - notes that her entire leg hurts - notes that it hurts in different ways at different times (sometimes feels heavy and more if turns her leg - at night tingling in the thigh) - has been living on Advil 2-3 pills per day but not helping - notes that she has trouble with her back chronically    Leg Pain   Associated symptoms include numbness  Review of Systems   Review of Systems   Genitourinary:        No incontinence   Musculoskeletal: Positive for back pain (chronic)  Left leg pain   Neurological: Positive for weakness and numbness  Active Problem List     Patient Active Problem List   Diagnosis    Abnormal EKG    Aneurysm, cerebral    Benign neoplasm of colon    Breast tenderness in female    Neck pain    Chronic migraine    Hearing loss    Displacement of thoracic intervertebral disc without myelopathy    Hyperlipidemia    Irritable bowel syndrome    Levoscoliosis    Lightheadedness    Chronic low back pain    Memory difficulties    Migraine    Onychomycosis    Osteoarthritis of both hands    Palpitations    Skin lesions    Anxiety    Gastritis    Overweight (BMI 25 0-29  9)    SHAY (acute kidney injury) ([facility] [address] )         Social History:  Social History     Socioeconomic History    Marital status: /Civil Union     Spouse name: Not on file    Number of children: Not on file    Years of education: Not on file    Highest education level: Not on file   Occupational History    Not on file   Social Needs    Financial resource strain: Not on file    Food insecurity     Worry: Not on file     Inability: Not on file   [employer] needs     Medical: Not on file     Non-medical: Not on file   Tobacco Use    Smoking status: Former Smoker     Packs/day: 1 00     Years: 20 00     Pack years: 20 00     Quit date: 0     Years since quittin 3    Smokeless tobacco: Never Used   Substance and Sexual Activity    Alcohol use: Yes     Frequency: 2-3 times a week     Comment: 1/2 glass of wine at a time    Drug use: Never    Sexual activity: Not on file   Lifestyle    Physical activity     Days per week: Not on file     Minutes per session: Not on file    Stress: Not on file   Relationships    Social connections     Talks on phone: Not on file     Gets together: Not on file     Attends L[employer]-day service: Not on file     Active member of club or organization: Not on file     Attends meetings of clubs or organizations: Not on file     Relationship status: Not on file    Intimate partner violence     Fear of current or ex partner: Not on file     Emotionally abused: Not on file     Physically abused: Not on file     Forced sexual activity: Not on file   Other Topics Concern    Not on file   Social History Narrative    Not on file       Objective     Vitals:    21 1601   BP: 138/88   BP Location: Left arm   Patient Position: Sitting   Cuff Size: Adult   Pulse: 61   Temp: 97 5 °F (36 4 °C)   TempSrc: Temporal   SpO2: 98%   Weight: 73 7 kg (162 lb 6 4 oz)   Height: 5' 6 9" (1 699 m)     Wt Readings from Last 3 Encounters:   21 73 7 kg (162 lb 6 4 oz)   21 72 9 kg (160 lb 12 8 oz)   10/21/20 70 8 kg (156 lb 2 oz)       Physical Exam  Vitals signs reviewed  Constitutional:       General: She is not in acute distress  Appearance: Normal appearance  She is well-developed  She is not ill-appearing  HENT:      Head: Normocephalic and atraumatic  Pulmonary:      Effort: Pulmonary effort is normal  No respiratory distress  Musculoskeletal:      Lumbar back: She exhibits decreased range of motion (with pain during forward flexion and lateral flexion bilaterally), tenderness (right paraspinal) and bony tenderness     Neurological:      Mental Status: She is alert  Sensory: No sensory deficit (light touch sensation is intact and equal bilaterally in LE)  Deep Tendon Reflexes:      Reflex Scores:       Patellar reflexes are 2+ on the right side and 1+ on the left side       Comments: Positive seated SLR on left   Psychiatric:         Mood and Affect: Mood normal          Behavior: Behavior normal          Pertinent Laboratory/Diagnostic Studies:  Results for orders placed or performed in visit on 02/03/21   Comprehensive metabolic panel   Result Value Ref Range    Sodium 142 136 - 145 mmol/L    Potassium 4 0 3 5 - 5 3 mmol/L    Chloride 110 (H) 100 - 108 mmol/L    CO2 29 21 - 32 mmol/L    ANION GAP 3 (L) 4 - 13 mmol/L    BUN 20 5 - 25 mg/dL    Creatinine 0 87 0 60 - 1 30 mg/dL    Glucose, Fasting 92 65 - 99 mg/dL    Calcium 10 0 8 3 - 10 1 mg/dL    AST 23 5 - 45 U/L    ALT 33 12 - 78 U/L    Alkaline Phosphatase 79 46 - 116 U/L    Total Protein 8 3 (H) 6 4 - 8 2 g/dL    Albumin 3 7 3 5 - 5 0 g/dL    Total Bilirubin 0 70 0 20 - 1 00 mg/dL    eGFR 69 ml/min/1 73sq m   CBC and differential   Result Value Ref Range    WBC 6 23 4 31 - 10 16 Thousand/uL    RBC 4 31 3 81 - 5 12 Million/uL    Hemoglobin 12 9 11 5 - 15 4 g/dL    Hematocrit 41 6 34 8 - 46 1 %    MCV 97 82 - 98 fL    MCH 29 9 26 8 - 34 3 pg    MCHC 31 0 (L) 31 4 - 37 4 g/dL    RDW 13 7 11 6 - 15 1 %    MPV 12 8 (H) 8 9 - 12 7 fL    Platelets 204 865 - 319 Thousands/uL    nRBC 0 /100 WBCs    Neutrophils Relative 66 43 - 75 %    Immat GRANS % 0 0 - 2 %    Lymphocytes Relative 21 14 - 44 %    Monocytes Relative 8 4 - 12 %    Eosinophils Relative 4 0 - 6 %    Basophils Relative 1 0 - 1 %    Neutrophils Absolute 4 08 1 85 - 7 62 Thousands/µL    Immature Grans Absolute 0 01 0 00 - 0 20 Thousand/uL    Lymphocytes Absolute 1 30 0 60 - 4 47 Thousands/µL    Monocytes Absolute 0 49 0 17 - 1 22 Thousand/µL    Eosinophils Absolute 0 27 0 00 - 0 61 Thousand/µL    Basophils Absolute 0 08 0 00 - 0 10 Thousands/µL   Lipid Panel with Direct LDL reflex   Result Value Ref Range    Cholesterol 215 (H) 50 - 200 mg/dL    Triglycerides 77 <=150 mg/dL    HDL, Direct 100 >=40 mg/dL    LDL Calculated 100 0 - 100 mg/dL   TSH, 3rd generation with Free T4 reflex   Result Value Ref Range    TSH 3RD GENERATON 2 950 0 358 - 3 740 uIU/mL         ALLERGIES:  No Known Allergies    Current Medications     Current Outpatient Medications   Medication Sig Dispense Refill    atorvastatin (LIPITOR) 80 mg tablet Take 1 tablet (80 mg total) by mouth daily 90 tablet 1    butalbital-acetaminophen-caffeine (FIORICET,ESGIC) -40 mg per tablet Take 1 tablet by mouth every 4 (four) hours as needed for headaches 20 tablet 0    escitalopram (LEXAPRO) 10 mg tablet TAKE 1 TABLET BY MOUTH EVERY DAY 90 tablet 3    Multiple Vitamins-Minerals (MULTIVITAL PO) Take 1 tablet by mouth daily      pantoprazole (PROTONIX) 20 mg tablet TAKE 1 TABLET BY MOUTH EVERY DAY 90 tablet 1    propranolol (INDERAL LA) 120 mg 24 hr capsule TAKE 1 CAPSULE BY MOUTH EVERY DAY 90 capsule 3    SUMAtriptan (IMITREX) 25 mg tablet Take 1 tablet (25 mg total) by mouth once as needed for migraine 9 tablet 3    Xiidra 5 % op solution       gabapentin (NEURONTIN) 100 mg capsule Take 1 capsule (100 mg total) by mouth daily at bedtime Increase gradually to 3 tablets at bedtime as needed  90 capsule 1    ibuprofen (MOTRIN) 800 mg tablet Take 1 tablet (800 mg total) by mouth every 8 (eight) hours as needed for mild pain or moderate pain 60 tablet 0     No current facility-administered medications for this visit            Dio Hua PA-C  5/3/2021 5:48 PM  [facility]

## 2021-05-26 DIAGNOSIS — G43.909 MIGRAINE WITHOUT STATUS MIGRAINOSUS, NOT INTRACTABLE, UNSPECIFIED MIGRAINE TYPE: ICD-10-CM

## 2021-05-26 NOTE — TELEPHONE ENCOUNTER
Patient called requesting a refill of:    butalbital-acetaminophen-caffeine (FIORICET,ESGIC) -40 mg per tablet  #20 / R-0    Per PDMP last fill 2/3/2021      Last OV 5/3/2021  Future OV 6/7/2021

## 2021-05-27 RX ORDER — BUTALBITAL, ACETAMINOPHEN AND CAFFEINE 50; 325; 40 MG/1; MG/1; MG/1
1 TABLET ORAL EVERY 4 HOURS PRN
Qty: 20 TABLET | Refills: 0 | Status: SHIPPED | OUTPATIENT
Start: 2021-05-27 | End: 2021-09-17 | Stop reason: SDUPTHER

## 2021-06-07 ENCOUNTER — OFFICE VISIT (OUTPATIENT)
Dept: FAMILY MEDICINE CLINIC | Facility: CLINIC | Age: 71
End: 2021-06-07
Payer: COMMERCIAL

## 2021-06-07 VITALS
SYSTOLIC BLOOD PRESSURE: 108 MMHG | BODY MASS INDEX: 25.74 KG/M2 | HEART RATE: 55 BPM | HEIGHT: 67 IN | DIASTOLIC BLOOD PRESSURE: 62 MMHG | WEIGHT: 164 LBS | TEMPERATURE: 97.6 F | OXYGEN SATURATION: 99 %

## 2021-06-07 DIAGNOSIS — G89.29 CHRONIC MIDLINE LOW BACK PAIN WITH LEFT-SIDED SCIATICA: Primary | ICD-10-CM

## 2021-06-07 DIAGNOSIS — M54.42 CHRONIC MIDLINE LOW BACK PAIN WITH LEFT-SIDED SCIATICA: Primary | ICD-10-CM

## 2021-06-07 DIAGNOSIS — R29.898 LEFT LEG WEAKNESS: ICD-10-CM

## 2021-06-07 DIAGNOSIS — M79.605 LEFT LEG PAIN: ICD-10-CM

## 2021-06-07 PROCEDURE — 99213 OFFICE O/P EST LOW 20 MIN: CPT | Performed by: PHYSICIAN ASSISTANT

## 2021-06-07 PROCEDURE — 1036F TOBACCO NON-USER: CPT | Performed by: PHYSICIAN ASSISTANT

## 2021-06-07 PROCEDURE — 3008F BODY MASS INDEX DOCD: CPT | Performed by: PHYSICIAN ASSISTANT

## 2021-06-07 PROCEDURE — 1160F RVW MEDS BY RX/DR IN RCRD: CPT | Performed by: PHYSICIAN ASSISTANT

## 2021-06-08 NOTE — ASSESSMENT & PLAN NOTE
"Chief Complaint   Patient presents with     RECHECK     UMP RETURN - MULTIPLE SCLEROSIS       Initial /85 (BP Location: Left arm, Patient Position: Sitting, Cuff Size: Adult Large)  Pulse 83  Resp 20  Ht 1.676 m (5' 6\")  Wt 86 kg (189 lb 9.6 oz)  SpO2 98%  BMI 30.6 kg/m2 Estimated body mass index is 30.6 kg/(m^2) as calculated from the following:    Height as of this encounter: 1.676 m (5' 6\").    Weight as of this encounter: 86 kg (189 lb 9.6 oz)..  BP completed using cuff size: large  Medications Reconciled: Yes  Barbara Penn CMA  10:57 AM          " Patient notes that her back pain is chronic but this leg pain is newer and persistent  It does not seem to be improving beyond temporary relief at night with gabapentin  Will order MRI of lumbar spine to further evaluate  Plan for [facility] Center referral if appropriate  Continue gabapentin 300 mg at bedtime and can continue with ibuprofen in day since gabapentin makes her too drowsy in the day  She will increase Protonix to 40 mg to help protect her stomach more though from the ibuprofen  Call with concerns or new/worsening symptoms

## 2021-06-16 ENCOUNTER — TELEPHONE (OUTPATIENT)
Dept: FAMILY MEDICINE CLINIC | Facility: CLINIC | Age: 71
End: 2021-06-16

## 2021-06-16 DIAGNOSIS — F41.8 SITUATIONAL ANXIETY: Primary | ICD-10-CM

## 2021-06-16 RX ORDER — LORAZEPAM 0.5 MG/1
TABLET ORAL
Qty: 1 TABLET | Refills: 0 | Status: SHIPPED | OUTPATIENT
Start: 2021-06-16 | End: 2021-10-25

## 2021-06-16 NOTE — TELEPHONE ENCOUNTER
Called patient and Lm to please call office back re: message below from Dr Petrona Shah  No details left d/t medication education to be relayed

## 2021-06-16 NOTE — TELEPHONE ENCOUNTER
Patient returned call  Informed of ativan Rx at [facility] [address] [place]  Medication education, per Dr Lester Hansen, for ativan has been relayed to the patient, who verbalized understanding

## 2021-06-16 NOTE — TELEPHONE ENCOUNTER
Patient called requesting a one-time medication to lessen her anxiety and discomfort during an MRI on Saturday 6/19/2021  Please advise

## 2021-06-16 NOTE — TELEPHONE ENCOUNTER
Subjective   Patient ID: Jennifer is a 52 year old female.    Chief Complaint   Patient presents with   • Consultation     Pt c/o acid reflux flare ups and upper abdominal pain      Upper abd pain about a mos ago and not since then and not all day  Not rad to back and not vomiting  Reflux is mid nos and r/t certain foods and not oft flare up  Last yr flare  Cannot lie down and sit up all nos and try tums and may not help  Vomit up acid  Eat mustard and all comes up  On weekly basis can get gerd  Was on zantac and was changed to pantoprazole 40 mg daily and helping  No tob or etoh  Is seeing ent and had  About 12  hr ph study and told severe reflux  occas dysphagia and that when eating too fast abdirahman fried food  Has intermittent hoarse voice      There is no problem list on file for this patient.    No past medical history on file.  No past surgical history on file.  No family history on file.  Social History     Tobacco Use   • Smoking status: Never Smoker   • Smokeless tobacco: Never Used   Substance Use Topics   • Alcohol use: Not Currently   • Drug use: Not Currently      ALLERGIES:   Allergen Reactions   • Caffeine HIVES and RASH   • Ciprofloxacin HIVES     Current Medications    IBUPROFEN (MOTRIN) 800 MG TABLET    Take 800 mg by mouth every 8 hours as needed.    MONTELUKAST (SINGULAIR) 10 MG TABLET        PANTOPRAZOLE (PROTONIX) 40 MG TABLET    Take 1 tablet by mouth daily.    RANITIDINE (RANITIDINE 150 MAX STRENGTH) 150 MG TABLET            Review of Systems  GI as per hpi    Objective   Vitals:   Vitals:    05/13/19 1501   BP: 123/78   Pulse: 75   Resp: 20   Temp: 98 °F (36.7 °C)   Weight: 95.8 kg (211 lb 3.2 oz)   Height: 5' 7\" (1.702 m)      Physical Exam   Constitutional: She appears well-developed and well-nourished.   HENT:   Head: Normocephalic.   Eyes: Conjunctivae are normal.   Neck: No tracheal deviation present. No thyromegaly present.   Cardiovascular: Normal rate, regular rhythm and normal heart  Sent in rx for ativan (checked pa-dmp - no red flags)  Pt to take 1 tab po 1 hour prior to procedure  She must have someone drive her  And she should then avoid driving/working that day due to sedation  Avoid taking with fioricet;    and may cause added sedation with neurontin  sounds.   Pulmonary/Chest: Effort normal and breath sounds normal. No stridor. No respiratory distress. She has no wheezes.   Abdominal: Soft. Bowel sounds are normal. She exhibits no distension. There is no tenderness. There is no guarding.   Musculoskeletal: Normal range of motion.   Neurological: She is alert.   Skin: Skin is warm.   Psychiatric: She has a normal mood and affect.       Assessment/Plan  gerd for yrs and dysphagia occas and also screen for colon ca  Pt sings and has hoarse voice   Plan colonoscopy and egd  pt educated on colonoscopy, rationale for procedure explained & R/B/A  Risks including potentially life threatening risk of bleeding, perforation,infection, missed lesions, and sedation explained  questions answered, verbalizes understanding and agrees to proceed  Watch diet   Aware of side effect long term ppiDiscussed potential side effects of PPI including but not limited to C.diff infection, colitis,  malabsorption of; Mg, Fe, B12, increased risk of fracture of hip, wrist & spine, kidney  disease, dementia and pneumonia      Assessment   Problem List Items Addressed This Visit        Digestive    GERD without esophagitis - Primary    Relevant Orders    SERVICE TO GASTROENTEROLOGY       Other    Special screening for malignant neoplasms, colon    Relevant Medications    bisacodyl (DULCOLAX) 5 MG EC tablet    Other Relevant Orders    SERVICE TO GASTROENTEROLOGY      Other Visit Diagnoses     Esophageal dysphagia        Relevant Orders    SERVICE TO GASTROENTEROLOGY

## 2021-06-19 ENCOUNTER — HOSPITAL ENCOUNTER (OUTPATIENT)
Dept: MRI IMAGING | Facility: HOSPITAL | Age: 71
Discharge: HOME | End: 2021-06-19
Payer: COMMERCIAL

## 2021-06-19 DIAGNOSIS — M79.605 LEFT LEG PAIN: ICD-10-CM

## 2021-06-19 DIAGNOSIS — R29.898 LEFT LEG WEAKNESS: ICD-10-CM

## 2021-06-19 DIAGNOSIS — M54.42 CHRONIC MIDLINE LOW BACK PAIN WITH LEFT-SIDED SCIATICA: ICD-10-CM

## 2021-06-19 DIAGNOSIS — G89.29 CHRONIC MIDLINE LOW BACK PAIN WITH LEFT-SIDED SCIATICA: ICD-10-CM

## 2021-06-19 PROCEDURE — G1004 CDSM NDSC: HCPCS

## 2021-06-19 PROCEDURE — 72148 MRI LUMBAR SPINE W/O DYE: CPT

## 2021-07-01 DIAGNOSIS — G89.29 CHRONIC MIDLINE LOW BACK PAIN WITH LEFT-SIDED SCIATICA: ICD-10-CM

## 2021-07-01 DIAGNOSIS — M54.42 CHRONIC MIDLINE LOW BACK PAIN WITH LEFT-SIDED SCIATICA: ICD-10-CM

## 2021-07-01 RX ORDER — GABAPENTIN 100 MG/1
100 CAPSULE ORAL
Qty: 90 CAPSULE | Refills: 1 | Status: SHIPPED | OUTPATIENT
Start: 2021-07-01 | End: 2021-08-31 | Stop reason: SDUPTHER

## 2021-07-22 DIAGNOSIS — E78.5 HYPERLIPIDEMIA, UNSPECIFIED HYPERLIPIDEMIA TYPE: ICD-10-CM

## 2021-07-22 RX ORDER — ATORVASTATIN CALCIUM 80 MG/1
TABLET, FILM COATED ORAL
Qty: 90 TABLET | Refills: 1 | Status: SHIPPED | OUTPATIENT
Start: 2021-07-22 | End: 2022-01-21

## 2021-07-26 DIAGNOSIS — G43.701 CHRONIC MIGRAINE WITHOUT AURA WITH STATUS MIGRAINOSUS, NOT INTRACTABLE: ICD-10-CM

## 2021-07-26 RX ORDER — SUMATRIPTAN 25 MG/1
25 TABLET, FILM COATED ORAL ONCE AS NEEDED
Qty: 9 TABLET | Refills: 0 | Status: SHIPPED | OUTPATIENT
Start: 2021-07-26 | End: 2021-09-17 | Stop reason: SDUPTHER

## 2021-07-28 DIAGNOSIS — K29.00 ACUTE GASTRITIS WITHOUT HEMORRHAGE, UNSPECIFIED GASTRITIS TYPE: ICD-10-CM

## 2021-07-28 RX ORDER — PANTOPRAZOLE SODIUM 20 MG/1
TABLET, DELAYED RELEASE ORAL
Qty: 90 TABLET | Refills: 1 | Status: SHIPPED | OUTPATIENT
Start: 2021-07-28 | End: 2021-11-22 | Stop reason: SDUPTHER

## 2021-07-29 ENCOUNTER — CONSULT (OUTPATIENT)
Dept: PAIN MEDICINE | Facility: MEDICAL CENTER | Age: 71
End: 2021-07-29
Payer: COMMERCIAL

## 2021-07-29 VITALS
SYSTOLIC BLOOD PRESSURE: 110 MMHG | HEIGHT: 67 IN | TEMPERATURE: 97.2 F | DIASTOLIC BLOOD PRESSURE: 58 MMHG | HEART RATE: 62 BPM | WEIGHT: 161 LBS | BODY MASS INDEX: 25.27 KG/M2

## 2021-07-29 DIAGNOSIS — M54.16 LUMBAR RADICULOPATHY: Primary | ICD-10-CM

## 2021-07-29 DIAGNOSIS — M48.061 SPINAL STENOSIS OF LUMBAR REGION, UNSPECIFIED WHETHER NEUROGENIC CLAUDICATION PRESENT: ICD-10-CM

## 2021-07-29 DIAGNOSIS — M51.36 DDD (DEGENERATIVE DISC DISEASE), LUMBAR: ICD-10-CM

## 2021-07-29 PROCEDURE — 99204 OFFICE O/P NEW MOD 45 MIN: CPT | Performed by: PHYSICAL MEDICINE & REHABILITATION

## 2021-07-29 PROCEDURE — 1036F TOBACCO NON-USER: CPT | Performed by: PHYSICAL MEDICINE & REHABILITATION

## 2021-07-29 PROCEDURE — 3008F BODY MASS INDEX DOCD: CPT | Performed by: PHYSICAL MEDICINE & REHABILITATION

## 2021-07-29 NOTE — PATIENT INSTRUCTIONS
Lumbar Disc Herniation   AMBULATORY CARE:   Lumbar disc herniation  occurs when a disc in your lumbar spine (lower back) bulges out  Lumbar discs are spongy cushions between the vertebrae (bones) in your spine  The herniated disc may press on your nerves or spinal cord  Common signs and symptoms include the following:  Mild lumbar disc herniation may not cause any signs or symptoms  You may have any of the following if the herniated disc presses against your nerves or spinal cord:  · Pain in your lower back, buttocks, groin, or legs    · Burning, stabbing, or tingling pain that shoots down one or both of your legs    · Numbness or weakness in one leg    · Trouble walking or moving your feet or toes    Seek care immediately if:   · You cannot control when you urinate or have a bowel movement  · You are unable to move one or both of your legs  · You lose feeling in your groin or buttocks  Contact your healthcare provider if:   · You have numbness in one or both of your legs  · You have low back pain while resting  · You have trouble moving one or both of your legs  · You begin leaking urine or bowel movement, and it is not normal for you  · Your pain gets worse, even after you take medicine  · You have questions or concerns about your condition or care  Treatment:  Your healthcare provider may have you rest in bed for a few days  It is best to rest on your side with your knees bent  Put a cushion between your knees to help decrease the pressure on your spine and nerves  You may also need any of following:  · NSAIDs , such as ibuprofen, help decrease swelling, pain, and fever  NSAIDs can cause stomach bleeding or kidney problems in certain people  If you take blood thinner medicine, always ask your healthcare provider if NSAIDs are safe for you  Always read the medicine label and follow directions  · Prescription pain medicine  may be given   Ask how to take this medicine safely  · Muscle relaxers  decrease pain and muscle spasms  · A steroid injection  may be given to reduce inflammation  Steroid medicine is injected into the epidural space  The epidural space is between your spinal cord and vertebrae  You may be given pain medicine along with the steroids  · Physical therapy  may be recommended by your healthcare provider  A physical therapist teaches you exercises to help improve movement and strength, and to decrease pain  A physical therapist can teach you safe ways to bend, lift, sit, and stand to help relieve back pain  · Surgery  may be needed to fix your herniated disc if other treatments have failed  Surgery may be done to remove your herniated disc and make your spine stronger  Surgery may also be done to decrease pressure on your nerves and spinal cord  Manage pain from a lumbar disc herniation:   · Apply heat  on your lower back for 20 to 30 minutes every 2 hours for as many days as directed  Heat helps decrease pain and muscle spasms  · Do low-stress exercise and activity  Exercises that do not stress your back muscles may help decrease your pain  Examples of low-stress exercises are walking, swimming, and biking  Avoid heavy lifting while your back is healing  Try not to sit for long periods of time  Talk to your healthcare provider before you start any new exercise program     Follow up with your healthcare provider as directed:  Write down your questions so you remember to ask them during your visits  © Copyright [employer] 2021 Information is for End User's use only and may not be sold, redistributed or otherwise used for commercial purposes  All illustrations and images included in CareNotes® are the copyrighted property of [employer]  or [employer]   The above information is an  only  It is not intended as medical advice for individual conditions or treatments   Talk to your doctor, nurse or pharmacist before following any medical regimen to see if it is safe and effective for you

## 2021-07-29 NOTE — PROGRESS NOTES
Assessment  1  Lumbar radiculopathy    2  Spinal stenosis of lumbar region, unspecified whether neurogenic claudication present    3  DDD (degenerative disc disease), lumbar        Plan  1  We'll schedule patient for left L4-5 transforaminal epidural steroid injection  This may need to be repeated  Complete risks and benefits including bleeding, infection, tissue reaction, allergic reaction were discussed  Verbal consent obtained  2  Continue gabapentin 300 mg q h s     She was unable to tolerate daily dosing of this  If she continues to have pain in requires a daytime medication would consider trialing Lyrica  My impressions and treatment recommendations were discussed in detail with the patient who verbalized understanding and had no further questions  Discharge instructions were provided  I personally saw and examined the patient and I agree with the above discussed plan of care  Orders Placed This Encounter   Procedures    [place] spine and pain procedure     Please expedite within insurance guidelines  Next week if possible, ok to double book     Standing Status:   Future     Standing Expiration Date:   7/29/2022     Order Specific Question:   Reason for Exam:     Answer:   (L) L4-5 TFESI     Order Specific Question:   Anticoagulant hold needed? Answer:   NO     No orders of the defined types were placed in this encounter  History of Present Illness    You Corral is a 78 y o  female seen in consultation regarding back and radiating left leg pain at the request of DEMETRICE Villalobos  She did have an injury which was a fall back in January of this year  She thinks this may have aggravated her symptoms  She has been experiencing the pain since then and it is now described as moderate in intensity rated as a 6/10 with nearly constant pain  There is no typical pattern  She describes shooting, pins and needles, pressure-like, dull, aching type pain    She does describe some lower extremity weakness especially with the left ankle  She does not require an assistive device for ambulation currently  She is localizing pain to the lower back with radiation down the left leg in an L4 distribution  Aggravating factors include lying down, standing, bending, walking, exercise  Alleviating factors include relaxation and sitting  Diagnostic studies include MRI of the lumbar spine  This does reveal L4-5 narrowing which is likely contributing to her current complaints  She has tried exercise and local heat or ice application without relief  Social history negative for tobacco marijuana use, positive for 3 times per week alcohol use  Currently using ibuprofen as well as gabapentin  These are providing some mild benefit but she is hopeful for further relief  I have personally reviewed and/or updated the patient's past medical history, past surgical history, family history, social history, current medications, allergies, and vital signs today  Review of Systems   Constitutional: Negative for fever and unexpected weight change  HENT: Negative for trouble swallowing  Eyes: Negative for visual disturbance  Respiratory: Negative for shortness of breath and wheezing  Cardiovascular: Negative for chest pain and palpitations  Gastrointestinal: Negative for constipation, diarrhea, nausea and vomiting  Endocrine: Negative for cold intolerance, heat intolerance and polydipsia  Genitourinary: Negative for difficulty urinating and frequency  Musculoskeletal: Positive for back pain, joint swelling and myalgias  Negative for arthralgias and gait problem  Skin: Negative for rash  Neurological: Negative for dizziness, seizures, syncope, weakness and headaches  Hematological: Does not bruise/bleed easily  Psychiatric/Behavioral: Positive for dysphoric mood  The patient is nervous/anxious  All other systems reviewed and are negative        Patient Active Problem List Diagnosis    Abnormal EKG    Aneurysm, cerebral    Benign neoplasm of colon    Breast tenderness in female    Neck pain    Chronic migraine    Hearing loss    Displacement of thoracic intervertebral disc without myelopathy    Hyperlipidemia    Irritable bowel syndrome    Levoscoliosis    Lightheadedness    Chronic low back pain    Memory difficulties    Migraine    Onychomycosis    Osteoarthritis of both hands    Palpitations    Skin lesions    Anxiety    Gastritis    Overweight (BMI 25 0-29  9)    SHAY (acute kidney injury) ([facility] [address] )       Past Medical History:   Diagnosis Date    Anxiety 2018    Arthritis 2008    Depression     GERD (gastroesophageal reflux disease) 2020    Headache(784 0) 1978    Hyperlipidemia        Past Surgical History:   Procedure Laterality Date    APPENDECTOMY      HYSTERECTOMY         Family History   Problem Relation Age of Onset    Heart failure Mother     Arthritis Mother     Coronary artery disease Mother     Lung cancer Father     Cancer Father     Multiple sclerosis Brother     Bipolar disorder Son     Lupus Daughter     Breast cancer Maternal Aunt     Asthma Son        Social History     Occupational History    Not on file   Tobacco Use    Smoking status: Former Smoker     Packs/day:      Years:      Pack years: 20      Quit date:      Years since quittin 5    Smokeless tobacco: Never Used   Vaping Use    Vaping Use: Never used   Substance and Sexual Activity    Alcohol use:  Yes     Alcohol/week: 3 0 standard drinks     Types: 3 Glasses of wine per week     Comment: 1/2 glass of wine at a time    Drug use: Never    Sexual activity: Not on file       Current Outpatient Medications on File Prior to Visit   Medication Sig    atorvastatin (LIPITOR) 80 mg tablet TAKE 1 TABLET BY MOUTH EVERY DAY    butalbital-acetaminophen-caffeine (FIORICET,ESGIC) -40 mg per tablet Take 1 tablet by mouth every 4 (four) hours as needed for headaches    escitalopram (LEXAPRO) 10 mg tablet TAKE 1 TABLET BY MOUTH EVERY DAY    gabapentin (NEURONTIN) 100 mg capsule Take 1 capsule (100 mg total) by mouth daily at bedtime Increase gradually to 3 tablets at bedtime as needed   ibuprofen (MOTRIN) 800 mg tablet Take 1 tablet (800 mg total) by mouth every 8 (eight) hours as needed for mild pain or moderate pain    LORazepam (ATIVAN) 0 5 mg tablet Take 1 tab po 1 hour prior to procedure; avoid driving/working while taking due to sedation    Multiple Vitamins-Minerals (MULTIVITAL PO) Take 1 tablet by mouth daily    pantoprazole (PROTONIX) 20 mg tablet TAKE 1 TABLET BY MOUTH EVERY DAY    propranolol (INDERAL LA) 120 mg 24 hr capsule TAKE 1 CAPSULE BY MOUTH EVERY DAY    SUMAtriptan (IMITREX) 25 mg tablet Take 1 tablet (25 mg total) by mouth once as needed for migraine    [DISCONTINUED] Xiidra 5 % op solution      No current facility-administered medications on file prior to visit  No Known Allergies    Physical Exam    /58   Pulse 62   Temp (!) 97 2 °F (36 2 °C)   Ht 5' 7" (1 702 m)   Wt 73 kg (161 lb)   BMI 25 22 kg/m²     LUMBAR  General: Well-developed, well-nourished individual in no acute distress  Mental: Appropriate mood and affect  Grossly oriented with coherent speech and thought processing   Neuro:  Cranial nerves: Cranial nerve function is grossly intact bilaterally   Strength: Bilateral lower extremity strength is normal and symmetric except for mild weakness in the left ankle dorsiflexors and plantar flexors graded 4/5  No atrophy or tone abnormalities noted   Reflexes: Bilateral lower extremity muscle stretch reflexes are physiologic and symmetric   No ankle clonus is noted   Sensation: No loss of sensation is noted   SLR/Foraminal Compression Maneuvers: Straight leg raising in the sitting position is positive for radicular pain on the left      Gait:  Gait/gross motor: Gait is normal  Station is normal  Toe walking, heel walking  are except for some mild difficulty on the left side both with toe and heel walking secondary to pain and some weakness  Musculoskeletal:  Palpation: Inspection and palpation of the spine and extremities are unremarkable   Spine: Normal pain-free range of motion except for lumbar extension which is limited in reproduces some axial lower back pain  No gross axial skeletal deformities   Skin: Skin inspection grossly negative for erythema, breakdown, or concerning lesions in affected area   Lymph: No lymphadenopathy is appreciated in the involved extremity   Vessels: No lower extremity edema   Lungs: Breathing is comfortable and regular  No dyspnea noted during examination   Eyes: Visual field grossly intact to confrontation  No redness appreciated  ENT: No craniofacial deformities or asymmetry  No neck masses appreciated  Imaging  MRI lumbar spine wo contrast: Result Notes   Marilee Alcazar   6/24/2021 11:02 AM EDT       Patient call the office and she was informed about her results  I also provided her with the phone number to schedule an appt  Marilee Alcazar   6/24/2021 10:28 AM EDT       Left vm for the pt to call the office in regards of results  Lisa Mcnulty PA-C   6/23/2021  7:29 PM EDT       Please let the patient know that her MRI showed significant degenerative changes in the lumbar spine including bulging discs narrowing the central spinal canal and bilateral foraminal openings  She also has scoliosis  I [facility]  Order has been entered           Study Result    Narrative & Impression   MRI LUMBAR SPINE WITHOUT CONTRAST     INDICATION: M54 42: Lumbago with sciatica, left side  G89 29:  Other chronic pain  M79 605: Pain in left leg  R29 898: Other symptoms and signs involving the musculoskeletal system      COMPARISON:  10/26/2017     TECHNIQUE:  Sagittal T1, sagittal T2, sagittal inversion recovery, axial T1 and axial T2, coronal T2     IMAGE QUALITY:  Diagnostic     FINDINGS:     VERTEBRAL BODIES:  There are 5 lumbar type vertebral bodies  Mildly exaggerated lumbar lordosis with minimal anterior spondylolisthesis of L5 upon S1  There is moderate levoscoliosis of the lumbar spine centered at the L2-3 level  Slight left lateral   subluxation of L3 in relation to L4  No compression fracture  There is type II fatty endplate marrow degenerative change noted at the L2-3 endplates, primarily right-sided      SACRUM:  Normal signal within the sacrum  No evidence of insufficiency or stress fracture      DISTAL CORD AND CONUS:  Normal size and signal within the distal cord and conus      PARASPINAL SOFT TISSUES:  Paraspinal soft tissues are unremarkable      LOWER THORACIC DISC SPACES:  Normal disc height and signal   No disc herniation, canal stenosis or foraminal narrowing      LUMBAR DISC SPACES:     L1-L2:  Normal      L2-L3:  Disc desiccation and loss of disc height  These changes are primarily right-sided along the cavity of the scoliosis  There is mild annular bulging with a small broad-based right foraminal and extraforaminal disc protrusion and asymmetric   endplate hypertrophic change  There is no significant central canal stenosis  Mild to moderate right foraminal narrowing  Correlate for corresponding radiculopathy      L3-L4:  Mild diffuse annular bulging and facet arthropathy  Mild ligamentum flavum thickening  These changes result in mild to moderate canal stenosis and mild bilateral foraminal narrowing      L4-L5:  Disc desiccation and loss of disc height  Annular bulging with a small broad-based left-sided foraminal and extraforaminal disc protrusion with asymmetric endplate hypertrophic change  There is no significant central canal stenosis  Mild left   foraminal narrowing      L5-S1:  Normal disc height and signal   No disc herniation  No canal stenosis    Left facet hypertrophic degenerative change    No foraminal nerve impingement      IMPRESSION:     Moderate levoscoliosis of the thoracolumbar spine centered at the L2-3 level where there is primarily right-sided disc and endplate change resulting in right-sided foraminal narrowing      There is mild to moderate canal stenosis with slight distortion of the thecal sac at the L3-4 level with mild bilateral foraminal narrowing      Mild noncompressive lumbar degenerative change at L4-5 and L5-S1         Workstation performed: [id number]

## 2021-08-03 ENCOUNTER — HOSPITAL ENCOUNTER (OUTPATIENT)
Dept: RADIOLOGY | Facility: MEDICAL CENTER | Age: 71
Discharge: HOME | End: 2021-08-03
Attending: PHYSICAL MEDICINE & REHABILITATION
Payer: COMMERCIAL

## 2021-08-03 VITALS
HEART RATE: 62 BPM | SYSTOLIC BLOOD PRESSURE: 106 MMHG | TEMPERATURE: 98.2 F | OXYGEN SATURATION: 96 % | RESPIRATION RATE: 18 BRPM | DIASTOLIC BLOOD PRESSURE: 65 MMHG

## 2021-08-03 DIAGNOSIS — M48.061 SPINAL STENOSIS OF LUMBAR REGION, UNSPECIFIED WHETHER NEUROGENIC CLAUDICATION PRESENT: ICD-10-CM

## 2021-08-03 DIAGNOSIS — M54.16 LUMBAR RADICULOPATHY: ICD-10-CM

## 2021-08-03 PROCEDURE — 64483 NJX AA&/STRD TFRM EPI L/S 1: CPT | Performed by: PHYSICAL MEDICINE & REHABILITATION

## 2021-08-03 RX ORDER — PAPAVERINE HCL 150 MG
10 CAPSULE, EXTENDED RELEASE ORAL ONCE
Status: COMPLETED | OUTPATIENT
Start: 2021-08-03 | End: 2021-08-03

## 2021-08-03 RX ADMIN — Medication 10 MG: at 15:08

## 2021-08-03 RX ADMIN — IOHEXOL 1 ML: 300 INJECTION, SOLUTION INTRAVENOUS at 15:08

## 2021-08-03 NOTE — DISCHARGE INSTRUCTIONS
Epidural Steroid Injection   WHAT YOU NEED TO KNOW:   An epidural steroid injection (SHALINI) is a procedure to inject steroid medicine into the epidural space  The epidural space is between your spinal cord and vertebrae  Steroids reduce inflammation and fluid buildup in your spine that may be causing pain  You may be given pain medicine along with the steroids  ACTIVITY  · Do not drive or operate machinery today  · No strenuous activity today - bending, lifting, etc   · You may resume normal activites starting tomorrow - start slowly and as tolerated  · You may shower today, but no tub baths or hot tubs  · You may have numbness for several hours from the local anesthetic  Please use caution and common sense, especially with weight-bearing activities  CARE OF THE INJECTION SITE  · If you have soreness or pain, apply ice to the area today (20 minutes on/20 minutes off)  · Starting tomorrow, you may use warm, moist heat or ice if needed  · You may have an increase or change in your discomfort for 36-48 hours after your treatment  · Apply ice and continue with any pain medication you have been prescribed  · Notify the [facility] if you have any of the following: redness, drainage, swelling, headache, stiff neck or fever above 100°F     SPECIAL INSTRUCTIONS  · Our office will contact you in approximately 7 days for a progress report  MEDICATIONS  · Continue to take all routine medications  · Our office may have instructed you to hold some medications  As no general anesthesia was used in today's procedure, you should not experience any side effects related to anesthesia  If you have a problem specifically related to your procedure, please call our office at [phone]  Problems not related to your procedure should be directed to your primary care physician

## 2021-08-03 NOTE — H&P
History of Present Illness: The patient is a 78 y o  female who presents with complaints of back and left leg pain    Patient Active Problem List   Diagnosis    Abnormal EKG    Aneurysm, cerebral    Benign neoplasm of colon    Breast tenderness in female    Neck pain    Chronic migraine    Hearing loss    Displacement of thoracic intervertebral disc without myelopathy    Hyperlipidemia    Irritable bowel syndrome    Levoscoliosis    Lightheadedness    Chronic low back pain    Memory difficulties    Migraine    Onychomycosis    Osteoarthritis of both hands    Palpitations    Skin lesions    Anxiety    Gastritis    Overweight (BMI 25 0-29  9)    SHAY (acute kidney injury) ([facility] [address] )       Past Medical History:   Diagnosis Date    Anxiety 2018    Arthritis 2008    Depression     GERD (gastroesophageal reflux disease) 2020    Headache(784 0) 1978    Hyperlipidemia        Past Surgical History:   Procedure Laterality Date    APPENDECTOMY      HYSTERECTOMY           Current Outpatient Medications:     atorvastatin (LIPITOR) 80 mg tablet, TAKE 1 TABLET BY MOUTH EVERY DAY, Disp: 90 tablet, Rfl: 1    butalbital-acetaminophen-caffeine (FIORICET,ESGIC) -40 mg per tablet, Take 1 tablet by mouth every 4 (four) hours as needed for headaches, Disp: 20 tablet, Rfl: 0    escitalopram (LEXAPRO) 10 mg tablet, TAKE 1 TABLET BY MOUTH EVERY DAY, Disp: 90 tablet, Rfl: 3    gabapentin (NEURONTIN) 100 mg capsule, Take 1 capsule (100 mg total) by mouth daily at bedtime Increase gradually to 3 tablets at bedtime as needed  , Disp: 90 capsule, Rfl: 1    ibuprofen (MOTRIN) 800 mg tablet, Take 1 tablet (800 mg total) by mouth every 8 (eight) hours as needed for mild pain or moderate pain, Disp: 60 tablet, Rfl: 0    LORazepam (ATIVAN) 0 5 mg tablet, Take 1 tab po 1 hour prior to procedure; avoid driving/working while taking due to sedation, Disp: 1 tablet, Rfl: 0    Multiple Vitamins-Minerals (MULTIVITAL PO), Take 1 tablet by mouth daily, Disp: , Rfl:     pantoprazole (PROTONIX) 20 mg tablet, TAKE 1 TABLET BY MOUTH EVERY DAY, Disp: 90 tablet, Rfl: 1    propranolol (INDERAL LA) 120 mg 24 hr capsule, TAKE 1 CAPSULE BY MOUTH EVERY DAY, Disp: 90 capsule, Rfl: 3    SUMAtriptan (IMITREX) 25 mg tablet, Take 1 tablet (25 mg total) by mouth once as needed for migraine, Disp: 9 tablet, Rfl: 0  No current facility-administered medications for this encounter  No Known Allergies    Physical Exam:   Vitals:    08/03/21 1458   BP: 133/80   Pulse: 59   Resp: 20   Temp: 98 2 °F (36 8 °C)   SpO2: 98%     General: Awake, Alert, Oriented x 3, Mood and affect appropriate  Respiratory: Respirations even and unlabored  Cardiovascular: Peripheral pulses intact; no edema  Musculoskeletal Exam: back and left leg pain    ASA Score: 2    Patient/Chart Verification  Patient ID Verified: Verbal  ID Band Applied: No  Consents Confirmed: Procedural  H&P( within 30 days) Verified: To be obtained in the Pre-Procedure area  Interval H&P(within 24 hr) Complete (required for Outpatients and Surgery Admit only): To be obtained in the Pre-Procedure area  Allergies Reviewed:  Yes  Anticoag/NSAID held?: NA  Currently on antibiotics?: No    Assessment: left lumbar radiculitis    Plan: (L) L4-5 TFESI

## 2021-08-10 ENCOUNTER — TELEPHONE (OUTPATIENT)
Dept: PAIN MEDICINE | Facility: CLINIC | Age: 71
End: 2021-08-10

## 2021-08-31 DIAGNOSIS — M54.42 CHRONIC MIDLINE LOW BACK PAIN WITH LEFT-SIDED SCIATICA: ICD-10-CM

## 2021-08-31 DIAGNOSIS — G89.29 CHRONIC MIDLINE LOW BACK PAIN WITH LEFT-SIDED SCIATICA: ICD-10-CM

## 2021-08-31 RX ORDER — GABAPENTIN 100 MG/1
100 CAPSULE ORAL
Qty: 90 CAPSULE | Refills: 0 | Status: SHIPPED | OUTPATIENT
Start: 2021-08-31 | End: 2021-09-30 | Stop reason: SDUPTHER

## 2021-09-01 ENCOUNTER — OFFICE VISIT (OUTPATIENT)
Dept: PAIN MEDICINE | Facility: MEDICAL CENTER | Age: 71
End: 2021-09-01
Payer: COMMERCIAL

## 2021-09-01 VITALS
HEART RATE: 64 BPM | TEMPERATURE: 97.6 F | WEIGHT: 163 LBS | BODY MASS INDEX: 25.58 KG/M2 | SYSTOLIC BLOOD PRESSURE: 104 MMHG | HEIGHT: 67 IN | DIASTOLIC BLOOD PRESSURE: 62 MMHG

## 2021-09-01 DIAGNOSIS — M51.24 DISPLACEMENT OF THORACIC INTERVERTEBRAL DISC WITHOUT MYELOPATHY: ICD-10-CM

## 2021-09-01 DIAGNOSIS — M54.16 LUMBAR RADICULOPATHY: Primary | ICD-10-CM

## 2021-09-01 PROCEDURE — 3008F BODY MASS INDEX DOCD: CPT | Performed by: NURSE PRACTITIONER

## 2021-09-01 PROCEDURE — 99213 OFFICE O/P EST LOW 20 MIN: CPT | Performed by: NURSE PRACTITIONER

## 2021-09-01 PROCEDURE — 1036F TOBACCO NON-USER: CPT | Performed by: NURSE PRACTITIONER

## 2021-09-01 PROCEDURE — 1160F RVW MEDS BY RX/DR IN RCRD: CPT | Performed by: NURSE PRACTITIONER

## 2021-09-01 NOTE — PROGRESS NOTES
Assessment  1  Lumbar radiculopathy    2  Displacement of thoracic intervertebral disc without myelopathy        Plan    I discussed with the patient that since there has been  significant improvement in the pain symptoms that we will hold off on any repeat injections at this point in time  However, I reviewed with the patient that if her symptoms should return, worsen, and/or experience new pain symptoms, she should call our office  to discuss repeating the injection  Follow-up as needed        My impressions and treatment recommendations were discussed in detail with the patient who verbalized understanding and had no further questions  Discharge instructions were provided  I personally saw and examined the patient and I agree with the above discussed plan of care  No orders of the defined types were placed in this encounter  No orders of the defined types were placed in this encounter  History of Present Illness    You Martins is a 78 y o  female  Presents for follow-up related to her left low back and leg pain  Today she reports she is doing better rates her pain 2/10  She gets occasional pain in the evening described as dull, and aching  She is status post a left L4-5 transforaminal epidural steroid injection with Dr Nabeel Jackson on August 3, 2021 she is reporting 85% of ongoing relief  She continues to take gabapentin 300 mg 1 tablet at bedtime as prescribed by her family doctor  I have personally reviewed and/or updated the patient's past medical history, past surgical history, family history, social history, current medications, allergies, and vital signs today  Review of Systems   Respiratory: Negative for shortness of breath  Cardiovascular: Negative for chest pain  Gastrointestinal: Negative for constipation, diarrhea, nausea and vomiting  Musculoskeletal: Positive for back pain, joint swelling and myalgias  Negative for arthralgias and gait problem     Skin: Negative for rash  Neurological: Negative for dizziness, seizures and weakness  All other systems reviewed and are negative  Patient Active Problem List   Diagnosis    Abnormal EKG    Aneurysm, cerebral    Benign neoplasm of colon    Breast tenderness in female    Neck pain    Chronic migraine    Hearing loss    Displacement of thoracic intervertebral disc without myelopathy    Hyperlipidemia    Irritable bowel syndrome    Levoscoliosis    Lightheadedness    Chronic low back pain    Memory difficulties    Migraine    Onychomycosis    Osteoarthritis of both hands    Palpitations    Skin lesions    Anxiety    Gastritis    Overweight (BMI 25 0-29  9)    SH[facility] (acute kidney injury) ([facility] [address]    Lumbar radiculopathy    Spinal stenosis of lumbar region       Past Medical History:   Diagnosis Date    Anxiety 2018    Arthritis 2008    Back pain     Depression     GERD (gastroesophageal reflux disease)     Headache(784 0) 1978    Hyperlipidemia        Past Surgical History:   Procedure Laterality Date    APPENDECTOMY      HYSTERECTOMY         Family History   Problem Relation Age of Onset    Heart failure Mother     Arthritis Mother     Coronary artery disease Mother     Lung cancer Father     Cancer Father     Multiple sclerosis Brother     Bipolar disorder Son     Lupus Daughter     Breast cancer Maternal Aunt     Asthma Son        Social History     Occupational History    Not on file   Tobacco Use    Smoking status: Former Smoker     Packs/day:      Years: 20 00     Pack years: 20 00     Quit date:      Years since quittin 6    Smokeless tobacco: Never Used   Vaping Use    Vaping Use: Never used   Substance and Sexual Activity    Alcohol use:  Yes     Alcohol/week: 3 0 standard drinks     Types: 3 Glasses of wine per week     Comment: 1/2 glass of wine at a time    Drug use: Never    Sexual activity: Not on file       Current Outpatient Medications on File Prior to Visit   Medication Sig    atorvastatin (LIPITOR) 80 mg tablet TAKE 1 TABLET BY MOUTH EVERY DAY    butalbital-acetaminophen-caffeine (FIORICET,ESGIC) -40 mg per tablet Take 1 tablet by mouth every 4 (four) hours as needed for headaches    escitalopram (LEXAPRO) 10 mg tablet TAKE 1 TABLET BY MOUTH EVERY DAY    gabapentin (NEURONTIN) 100 mg capsule Take 1 capsule (100 mg total) by mouth daily at bedtime Increase gradually to 3 tablets at bedtime as needed   ibuprofen (MOTRIN) 800 mg tablet Take 1 tablet (800 mg total) by mouth every 8 (eight) hours as needed for mild pain or moderate pain    Multiple Vitamins-Minerals (MULTIVITAL PO) Take 1 tablet by mouth daily    pantoprazole (PROTONIX) 20 mg tablet TAKE 1 TABLET BY MOUTH EVERY DAY    propranolol (INDERAL LA) 120 mg 24 hr capsule TAKE 1 CAPSULE BY MOUTH EVERY DAY    SUMAtriptan (IMITREX) 25 mg tablet Take 1 tablet (25 mg total) by mouth once as needed for migraine    LORazepam (ATIVAN) 0 5 mg tablet Take 1 tab po 1 hour prior to procedure; avoid driving/working while taking due to sedation     No current facility-administered medications on file prior to visit  No Known Allergies    Physical Exam    /62   Pulse 64   Temp 97 6 °F (36 4 °C)   Ht 5' 7" (1 702 m)   Wt 73 9 kg (163 lb)   BMI 25 53 kg/m²     Constitutional: normal, well developed, well nourished, alert, in no distress and non-toxic and no overt pain behavior    Eyes: anicteric  HEENT: grossly intact  Neck: supple, symmetric, trachea midline and no masses   Pulmonary:even and unlabored  Cardiovascular:No edema or pitting edema present  Skin:Normal without rashes or lesions and well hydrated  Psychiatric:Mood and affect appropriate  Neurologic:Cranial Nerves II-XII grossly intact  Musculoskeletal:normal    Imaging

## 2021-09-17 ENCOUNTER — TELEPHONE (OUTPATIENT)
Dept: ADMINISTRATIVE | Facility: OTHER | Age: 71
End: 2021-09-17

## 2021-09-17 DIAGNOSIS — G43.701 CHRONIC MIGRAINE WITHOUT AURA WITH STATUS MIGRAINOSUS, NOT INTRACTABLE: ICD-10-CM

## 2021-09-17 DIAGNOSIS — G43.909 MIGRAINE WITHOUT STATUS MIGRAINOSUS, NOT INTRACTABLE, UNSPECIFIED MIGRAINE TYPE: ICD-10-CM

## 2021-09-17 RX ORDER — SUMATRIPTAN 25 MG/1
25 TABLET, FILM COATED ORAL ONCE AS NEEDED
Qty: 9 TABLET | Refills: 0 | Status: SHIPPED | OUTPATIENT
Start: 2021-09-17 | End: 2021-10-14

## 2021-09-17 NOTE — TELEPHONE ENCOUNTER
----- Message from Augustine Lazar sent at 9/16/2021  2:28 PM EDT -----  Regarding: Mammo update  09/16/21 2:29 PM    Hello, our patient Neli Corrales has had Mammogram completed/performed  Please assist in updating the patient chart by pulling the Care Everywhere (CE) document  The date of service is 9/14/21       Thank you,  Augustine Lazar  [address]

## 2021-09-20 RX ORDER — BUTALBITAL, ACETAMINOPHEN AND CAFFEINE 50; 325; 40 MG/1; MG/1; MG/1
1 TABLET ORAL EVERY 4 HOURS PRN
Qty: 20 TABLET | Refills: 0 | Status: SHIPPED | OUTPATIENT
Start: 2021-09-20 | End: 2021-12-07 | Stop reason: SDUPTHER

## 2021-09-30 DIAGNOSIS — M54.42 CHRONIC MIDLINE LOW BACK PAIN WITH LEFT-SIDED SCIATICA: ICD-10-CM

## 2021-09-30 DIAGNOSIS — G89.29 CHRONIC MIDLINE LOW BACK PAIN WITH LEFT-SIDED SCIATICA: ICD-10-CM

## 2021-10-01 RX ORDER — GABAPENTIN 300 MG/1
300 CAPSULE ORAL
Qty: 90 CAPSULE | Refills: 1 | Status: SHIPPED | OUTPATIENT
Start: 2021-10-01 | End: 2022-03-31 | Stop reason: SDUPTHER

## 2021-10-14 DIAGNOSIS — G43.701 CHRONIC MIGRAINE WITHOUT AURA WITH STATUS MIGRAINOSUS, NOT INTRACTABLE: ICD-10-CM

## 2021-10-14 RX ORDER — SUMATRIPTAN 25 MG/1
25 TABLET, FILM COATED ORAL ONCE AS NEEDED
Qty: 9 TABLET | Refills: 0 | Status: SHIPPED | OUTPATIENT
Start: 2021-10-14 | End: 2021-10-29

## 2021-10-25 ENCOUNTER — OFFICE VISIT (OUTPATIENT)
Dept: FAMILY MEDICINE CLINIC | Facility: CLINIC | Age: 71
End: 2021-10-25
Payer: COMMERCIAL

## 2021-10-25 VITALS
SYSTOLIC BLOOD PRESSURE: 122 MMHG | BODY MASS INDEX: 26.18 KG/M2 | OXYGEN SATURATION: 100 % | HEART RATE: 62 BPM | HEIGHT: 67 IN | TEMPERATURE: 97.8 F | WEIGHT: 166.8 LBS | RESPIRATION RATE: 12 BRPM | DIASTOLIC BLOOD PRESSURE: 80 MMHG

## 2021-10-25 DIAGNOSIS — K29.70 GASTRITIS, PRESENCE OF BLEEDING UNSPECIFIED, UNSPECIFIED CHRONICITY, UNSPECIFIED GASTRITIS TYPE: ICD-10-CM

## 2021-10-25 DIAGNOSIS — E66.3 OVERWEIGHT (BMI 25.0-29.9): ICD-10-CM

## 2021-10-25 DIAGNOSIS — Z12.12 SCREENING FOR COLORECTAL CANCER: ICD-10-CM

## 2021-10-25 DIAGNOSIS — Z78.0 ASYMPTOMATIC POSTMENOPAUSAL STATE: ICD-10-CM

## 2021-10-25 DIAGNOSIS — E78.5 HYPERLIPIDEMIA, UNSPECIFIED HYPERLIPIDEMIA TYPE: ICD-10-CM

## 2021-10-25 DIAGNOSIS — Z00.00 ANNUAL PHYSICAL EXAM: Primary | ICD-10-CM

## 2021-10-25 DIAGNOSIS — F41.9 ANXIETY: ICD-10-CM

## 2021-10-25 DIAGNOSIS — Z23 ENCOUNTER FOR IMMUNIZATION: ICD-10-CM

## 2021-10-25 DIAGNOSIS — Z12.83 SKIN CANCER SCREENING: ICD-10-CM

## 2021-10-25 DIAGNOSIS — Z13.1 DIABETES MELLITUS SCREENING: ICD-10-CM

## 2021-10-25 DIAGNOSIS — Z12.11 SCREENING FOR COLORECTAL CANCER: ICD-10-CM

## 2021-10-25 PROCEDURE — 90471 IMMUNIZATION ADMIN: CPT

## 2021-10-25 PROCEDURE — 3288F FALL RISK ASSESSMENT DOCD: CPT | Performed by: PHYSICIAN ASSISTANT

## 2021-10-25 PROCEDURE — 3008F BODY MASS INDEX DOCD: CPT | Performed by: PHYSICIAN ASSISTANT

## 2021-10-25 PROCEDURE — 3725F SCREEN DEPRESSION PERFORMED: CPT | Performed by: PHYSICIAN ASSISTANT

## 2021-10-25 PROCEDURE — 1160F RVW MEDS BY RX/DR IN RCRD: CPT | Performed by: PHYSICIAN ASSISTANT

## 2021-10-25 PROCEDURE — 1036F TOBACCO NON-USER: CPT | Performed by: PHYSICIAN ASSISTANT

## 2021-10-25 PROCEDURE — 1170F FXNL STATUS ASSESSED: CPT | Performed by: PHYSICIAN ASSISTANT

## 2021-10-25 PROCEDURE — 99397 PER PM REEVAL EST PAT 65+ YR: CPT | Performed by: PHYSICIAN ASSISTANT

## 2021-10-25 PROCEDURE — 90662 IIV NO PRSV INCREASED AG IM: CPT

## 2021-10-25 RX ORDER — ZOSTER VACCINE RECOMBINANT, ADJUVANTED 50 MCG/0.5
0.5 KIT INTRAMUSCULAR ONCE
Qty: 1 EACH | Refills: 1 | Status: SHIPPED | OUTPATIENT
Start: 2021-10-25 | End: 2021-10-25

## 2021-10-26 PROBLEM — N17.9 AKI (ACUTE KIDNEY INJURY) (HCC): Status: RESOLVED | Noted: 2021-02-03 | Resolved: 2021-10-26

## 2021-10-29 DIAGNOSIS — G43.701 CHRONIC MIGRAINE WITHOUT AURA WITH STATUS MIGRAINOSUS, NOT INTRACTABLE: ICD-10-CM

## 2021-10-29 RX ORDER — SUMATRIPTAN 25 MG/1
TABLET, FILM COATED ORAL
Qty: 9 TABLET | Refills: 0 | Status: SHIPPED | OUTPATIENT
Start: 2021-10-29 | End: 2022-02-06 | Stop reason: SDUPTHER

## 2021-11-22 ENCOUNTER — OFFICE VISIT (OUTPATIENT)
Dept: FAMILY MEDICINE CLINIC | Facility: CLINIC | Age: 71
End: 2021-11-22
Payer: COMMERCIAL

## 2021-11-22 VITALS
BODY MASS INDEX: 25.74 KG/M2 | HEIGHT: 67 IN | WEIGHT: 164 LBS | HEART RATE: 66 BPM | DIASTOLIC BLOOD PRESSURE: 72 MMHG | OXYGEN SATURATION: 99 % | SYSTOLIC BLOOD PRESSURE: 110 MMHG

## 2021-11-22 DIAGNOSIS — F41.9 ANXIETY: ICD-10-CM

## 2021-11-22 DIAGNOSIS — K29.00 ACUTE GASTRITIS WITHOUT HEMORRHAGE, UNSPECIFIED GASTRITIS TYPE: Primary | ICD-10-CM

## 2021-11-22 PROCEDURE — 1036F TOBACCO NON-USER: CPT | Performed by: PHYSICIAN ASSISTANT

## 2021-11-22 PROCEDURE — 99213 OFFICE O/P EST LOW 20 MIN: CPT | Performed by: PHYSICIAN ASSISTANT

## 2021-11-22 PROCEDURE — 1160F RVW MEDS BY RX/DR IN RCRD: CPT | Performed by: PHYSICIAN ASSISTANT

## 2021-11-22 RX ORDER — ESCITALOPRAM OXALATE 20 MG/1
20 TABLET ORAL DAILY
Qty: 90 TABLET | Refills: 1 | Status: SHIPPED | OUTPATIENT
Start: 2021-11-22 | End: 2022-05-16

## 2021-11-22 RX ORDER — PANTOPRAZOLE SODIUM 40 MG/1
40 TABLET, DELAYED RELEASE ORAL 2 TIMES DAILY
Qty: 60 TABLET | Refills: 0 | Status: SHIPPED | OUTPATIENT
Start: 2021-11-22 | End: 2021-11-23

## 2021-11-22 RX ORDER — SUCRALFATE 1 G/1
1 TABLET ORAL 4 TIMES DAILY
Qty: 120 TABLET | Refills: 1 | Status: SHIPPED | OUTPATIENT
Start: 2021-11-22

## 2021-11-23 ENCOUNTER — CONSULT (OUTPATIENT)
Dept: GASTROENTEROLOGY | Facility: CLINIC | Age: 71
End: 2021-11-23
Payer: COMMERCIAL

## 2021-11-23 VITALS
TEMPERATURE: 96.3 F | BODY MASS INDEX: 26.06 KG/M2 | HEIGHT: 67 IN | SYSTOLIC BLOOD PRESSURE: 120 MMHG | DIASTOLIC BLOOD PRESSURE: 80 MMHG | WEIGHT: 166 LBS | HEART RATE: 57 BPM

## 2021-11-23 DIAGNOSIS — K29.00 ACUTE GASTRITIS WITHOUT HEMORRHAGE, UNSPECIFIED GASTRITIS TYPE: ICD-10-CM

## 2021-11-23 DIAGNOSIS — R10.10 UPPER ABDOMINAL PAIN: Primary | ICD-10-CM

## 2021-11-23 DIAGNOSIS — Z86.010 PERSONAL HISTORY OF COLONIC POLYPS: ICD-10-CM

## 2021-11-23 PROCEDURE — 3008F BODY MASS INDEX DOCD: CPT | Performed by: PHYSICIAN ASSISTANT

## 2021-11-23 PROCEDURE — 99244 OFF/OP CNSLTJ NEW/EST MOD 40: CPT | Performed by: PHYSICIAN ASSISTANT

## 2021-11-23 RX ORDER — OMEPRAZOLE 40 MG/1
40 CAPSULE, DELAYED RELEASE ORAL 2 TIMES DAILY
Qty: 60 CAPSULE | Refills: 2 | Status: SHIPPED | OUTPATIENT
Start: 2021-11-23 | End: 2022-02-06 | Stop reason: SDUPTHER

## 2021-11-23 RX ORDER — ALUMINUM HYDROXIDE, MAGNESIUM HYDROXIDE, SIMETHICONE 400; 400; 40 MG/10ML; MG/10ML; MG/10ML
30 SUSPENSION ORAL
Qty: 710 ML | Refills: 2 | Status: SHIPPED | OUTPATIENT
Start: 2021-11-23

## 2021-11-26 ENCOUNTER — HOSPITAL ENCOUNTER (OUTPATIENT)
Dept: ULTRASOUND IMAGING | Facility: MEDICAL CENTER | Age: 71
Discharge: HOME | End: 2021-11-26
Payer: COMMERCIAL

## 2021-11-26 DIAGNOSIS — R10.10 UPPER ABDOMINAL PAIN: ICD-10-CM

## 2021-11-26 PROCEDURE — 76700 US EXAM ABDOM COMPLETE: CPT

## 2021-12-07 DIAGNOSIS — G43.909 MIGRAINE WITHOUT STATUS MIGRAINOSUS, NOT INTRACTABLE, UNSPECIFIED MIGRAINE TYPE: ICD-10-CM

## 2021-12-07 RX ORDER — BUTALBITAL, ACETAMINOPHEN AND CAFFEINE 50; 325; 40 MG/1; MG/1; MG/1
1 TABLET ORAL EVERY 4 HOURS PRN
Qty: 20 TABLET | Refills: 0 | Status: SHIPPED | OUTPATIENT
Start: 2021-12-07 | End: 2022-03-18 | Stop reason: SDUPTHER

## 2021-12-22 DIAGNOSIS — I10 ESSENTIAL HYPERTENSION: ICD-10-CM

## 2021-12-22 RX ORDER — PROPRANOLOL HYDROCHLORIDE 120 MG/1
120 CAPSULE, EXTENDED RELEASE ORAL DAILY
Qty: 90 CAPSULE | Refills: 3 | Status: SHIPPED | OUTPATIENT
Start: 2021-12-22 | End: 2022-05-26 | Stop reason: SDUPTHER

## 2022-01-17 ENCOUNTER — TELEPHONE (OUTPATIENT)
Dept: GASTROENTEROLOGY | Facility: CLINIC | Age: 72
End: 2022-01-17

## 2022-01-17 NOTE — TELEPHONE ENCOUNTER
Patients GI provider:  Dr Cathy Kowalski    Number to return call: [phone]    Reason for call: Pt called to cancel procedure stated does not wish to r/s no reason given      Scheduled procedure/appointment date if applicable: Procedure 5/84/49

## 2022-01-21 DIAGNOSIS — E78.5 HYPERLIPIDEMIA, UNSPECIFIED HYPERLIPIDEMIA TYPE: ICD-10-CM

## 2022-01-21 RX ORDER — ATORVASTATIN CALCIUM 80 MG/1
TABLET, FILM COATED ORAL
Qty: 90 TABLET | Refills: 1 | Status: SHIPPED | OUTPATIENT
Start: 2022-01-21 | End: 2022-07-21

## 2022-02-06 DIAGNOSIS — G43.701 CHRONIC MIGRAINE WITHOUT AURA WITH STATUS MIGRAINOSUS, NOT INTRACTABLE: ICD-10-CM

## 2022-02-07 RX ORDER — SUMATRIPTAN 25 MG/1
25 TABLET, FILM COATED ORAL ONCE AS NEEDED
Qty: 9 TABLET | Refills: 0 | Status: SHIPPED | OUTPATIENT
Start: 2022-02-07 | End: 2022-03-03

## 2022-03-03 DIAGNOSIS — G43.701 CHRONIC MIGRAINE WITHOUT AURA WITH STATUS MIGRAINOSUS, NOT INTRACTABLE: ICD-10-CM

## 2022-03-03 RX ORDER — SUMATRIPTAN 25 MG/1
25 TABLET, FILM COATED ORAL ONCE AS NEEDED
Qty: 9 TABLET | Refills: 0 | Status: SHIPPED | OUTPATIENT
Start: 2022-03-03 | End: 2022-03-26

## 2022-03-10 ENCOUNTER — TELEPHONE (OUTPATIENT)
Dept: FAMILY MEDICINE CLINIC | Facility: CLINIC | Age: 72
End: 2022-03-10

## 2022-03-10 NOTE — TELEPHONE ENCOUNTER
Patient called 3/10/22    Asking how to taper her lexapro , she doesn't want to just stop    Please give her a call with the directions

## 2022-03-11 NOTE — TELEPHONE ENCOUNTER
LM for patient to call back  Please find out how much she has of her Lexapro 20 mg pills currently  I recommend decreasing to 10 mg daily x 2 weeks, then 5 mg daily x 2 weeks, then 5 mg every other day x 2 weeks, then stop

## 2022-03-14 NOTE — TELEPHONE ENCOUNTER
Patient clarifies that she does not want to discontinue escitalopram  Patient wants to decrease from 20 mg to 10 mg daily  Since increasing to 20 mg end of November (last OV 11/22/21), S/e of fatigue has not relieved  Please advise if there is a titration schedule for escitalopram 20 mg to 10 mg

## 2022-03-15 NOTE — TELEPHONE ENCOUNTER
Patient do not need new Rx for 10 mg Lexapro, she is taking 1/2 tab of 20 mg for now, she will call when almost done

## 2022-03-18 DIAGNOSIS — G43.909 MIGRAINE WITHOUT STATUS MIGRAINOSUS, NOT INTRACTABLE, UNSPECIFIED MIGRAINE TYPE: ICD-10-CM

## 2022-03-18 RX ORDER — BUTALBITAL, ACETAMINOPHEN AND CAFFEINE 50; 325; 40 MG/1; MG/1; MG/1
1 TABLET ORAL EVERY 4 HOURS PRN
Qty: 20 TABLET | Refills: 0 | Status: SHIPPED | OUTPATIENT
Start: 2022-03-18 | End: 2022-06-15 | Stop reason: SDUPTHER

## 2022-03-26 DIAGNOSIS — G43.701 CHRONIC MIGRAINE WITHOUT AURA WITH STATUS MIGRAINOSUS, NOT INTRACTABLE: ICD-10-CM

## 2022-03-26 RX ORDER — SUMATRIPTAN 25 MG/1
TABLET, FILM COATED ORAL
Qty: 9 TABLET | Refills: 0 | Status: SHIPPED | OUTPATIENT
Start: 2022-03-26 | End: 2022-05-26 | Stop reason: SDUPTHER

## 2022-04-29 DIAGNOSIS — R10.10 UPPER ABDOMINAL PAIN: ICD-10-CM

## 2022-04-29 DIAGNOSIS — G89.29 CHRONIC MIDLINE LOW BACK PAIN WITH LEFT-SIDED SCIATICA: ICD-10-CM

## 2022-04-29 DIAGNOSIS — M54.42 CHRONIC MIDLINE LOW BACK PAIN WITH LEFT-SIDED SCIATICA: ICD-10-CM

## 2022-04-29 RX ORDER — GABAPENTIN 300 MG/1
300 CAPSULE ORAL
Qty: 30 CAPSULE | Refills: 0 | Status: SHIPPED | OUTPATIENT
Start: 2022-04-29 | End: 2022-05-26 | Stop reason: SDUPTHER

## 2022-04-29 RX ORDER — OMEPRAZOLE 40 MG/1
40 CAPSULE, DELAYED RELEASE ORAL 2 TIMES DAILY
Qty: 60 CAPSULE | Refills: 0 | Status: SHIPPED | OUTPATIENT
Start: 2022-04-29 | End: 2022-05-24

## 2022-05-10 LAB
ALBUMIN SERPL-MCNC: 3.9 G/DL (ref 3.6–5.1)
ALBUMIN/GLOB SERPL: 1.2 (CALC) (ref 1–2.5)
ALP SERPL-CCNC: 76 U/L (ref 37–153)
ALT SERPL-CCNC: 16 U/L (ref 6–29)
AST SERPL-CCNC: 18 U/L (ref 10–35)
BASOPHILS # BLD AUTO: 62 CELLS/UL (ref 0–200)
BASOPHILS NFR BLD AUTO: 1.4 %
BILIRUB SERPL-MCNC: 0.5 MG/DL (ref 0.2–1.2)
BUN SERPL-MCNC: 17 MG/DL (ref 7–25)
BUN/CREAT SERPL: ABNORMAL (CALC) (ref 6–22)
CALCIUM SERPL-MCNC: 9.3 MG/DL (ref 8.6–10.4)
CHLORIDE SERPL-SCNC: 107 MMOL/L (ref 98–110)
CHOLEST SERPL-MCNC: 213 MG/DL
CHOLEST/HDLC SERPL: 2.6 (CALC)
CO2 SERPL-SCNC: 30 MMOL/L (ref 20–32)
CREAT SERPL-MCNC: 0.87 MG/DL (ref 0.5–0.99)
EOSINOPHIL # BLD AUTO: 180 CELLS/UL (ref 15–500)
EOSINOPHIL NFR BLD AUTO: 4.1 %
ERYTHROCYTE [DISTWIDTH] IN BLOOD BY AUTOMATED COUNT: 13.2 % (ref 11–15)
GLOBULIN SER CALC-MCNC: 3.2 G/DL (CALC) (ref 1.9–3.7)
GLUCOSE SERPL-MCNC: 107 MG/DL (ref 65–99)
HCT VFR BLD AUTO: 36.8 % (ref 35–45)
HDLC SERPL-MCNC: 82 MG/DL
HGB BLD-MCNC: 12.1 G/DL (ref 11.7–15.5)
LDLC SERPL CALC-MCNC: 112 MG/DL (CALC)
LYMPHOCYTES # BLD AUTO: 1324 CELLS/UL (ref 850–3900)
LYMPHOCYTES NFR BLD AUTO: 30.1 %
MCH RBC QN AUTO: 30 PG (ref 27–33)
MCHC RBC AUTO-ENTMCNC: 32.9 G/DL (ref 32–36)
MCV RBC AUTO: 91.1 FL (ref 80–100)
MONOCYTES # BLD AUTO: 383 CELLS/UL (ref 200–950)
MONOCYTES NFR BLD AUTO: 8.7 %
NEUTROPHILS # BLD AUTO: 2451 CELLS/UL (ref 1500–7800)
NEUTROPHILS NFR BLD AUTO: 55.7 %
NONHDLC SERPL-MCNC: 131 MG/DL (CALC)
PLATELET # BLD AUTO: 167 THOUSAND/UL (ref 140–400)
PMV BLD REES-ECKER: 12.4 FL (ref 7.5–12.5)
POTASSIUM SERPL-SCNC: 3.8 MMOL/L (ref 3.5–5.3)
PROT SERPL-MCNC: 7.1 G/DL (ref 6.1–8.1)
RBC # BLD AUTO: 4.04 MILLION/UL (ref 3.8–5.1)
SL AMB EGFR AFRICAN AMERICAN: 79 ML/MIN/1.73M2
SL AMB EGFR NON AFRICAN AMERICAN: 68 ML/MIN/1.73M2
SODIUM SERPL-SCNC: 142 MMOL/L (ref 135–146)
TRIGL SERPL-MCNC: 93 MG/DL
TSH SERPL-ACNC: 2.88 MIU/L (ref 0.4–4.5)
WBC # BLD AUTO: 4.4 THOUSAND/UL (ref 3.8–10.8)

## 2022-05-16 DIAGNOSIS — F41.9 ANXIETY: ICD-10-CM

## 2022-05-16 RX ORDER — ESCITALOPRAM OXALATE 20 MG/1
TABLET ORAL
Qty: 90 TABLET | Refills: 1 | Status: SHIPPED | OUTPATIENT
Start: 2022-05-16

## 2022-05-24 DIAGNOSIS — R10.10 UPPER ABDOMINAL PAIN: ICD-10-CM

## 2022-05-24 RX ORDER — OMEPRAZOLE 40 MG/1
CAPSULE, DELAYED RELEASE ORAL
Qty: 60 CAPSULE | Refills: 0 | Status: SHIPPED | OUTPATIENT
Start: 2022-05-24 | End: 2022-06-19

## 2022-05-26 DIAGNOSIS — G89.29 CHRONIC MIDLINE LOW BACK PAIN WITH LEFT-SIDED SCIATICA: ICD-10-CM

## 2022-05-26 DIAGNOSIS — I10 ESSENTIAL HYPERTENSION: ICD-10-CM

## 2022-05-26 DIAGNOSIS — M54.42 CHRONIC MIDLINE LOW BACK PAIN WITH LEFT-SIDED SCIATICA: ICD-10-CM

## 2022-05-26 DIAGNOSIS — G43.701 CHRONIC MIGRAINE WITHOUT AURA WITH STATUS MIGRAINOSUS, NOT INTRACTABLE: ICD-10-CM

## 2022-05-27 RX ORDER — PROPRANOLOL HYDROCHLORIDE 120 MG/1
120 CAPSULE, EXTENDED RELEASE ORAL DAILY
Qty: 90 CAPSULE | Refills: 0 | Status: SHIPPED | OUTPATIENT
Start: 2022-05-27

## 2022-05-27 RX ORDER — GABAPENTIN 300 MG/1
300 CAPSULE ORAL
Qty: 30 CAPSULE | Refills: 0 | Status: SHIPPED | OUTPATIENT
Start: 2022-05-27 | End: 2022-06-27 | Stop reason: SDUPTHER

## 2022-05-27 RX ORDER — SUMATRIPTAN 25 MG/1
25 TABLET, FILM COATED ORAL ONCE AS NEEDED
Qty: 9 TABLET | Refills: 0 | Status: SHIPPED | OUTPATIENT
Start: 2022-05-27 | End: 2022-06-24

## 2022-06-15 DIAGNOSIS — G43.909 MIGRAINE WITHOUT STATUS MIGRAINOSUS, NOT INTRACTABLE, UNSPECIFIED MIGRAINE TYPE: ICD-10-CM

## 2022-06-16 RX ORDER — BUTALBITAL, ACETAMINOPHEN AND CAFFEINE 50; 325; 40 MG/1; MG/1; MG/1
1 TABLET ORAL EVERY 4 HOURS PRN
Qty: 20 TABLET | Refills: 0 | Status: SHIPPED | OUTPATIENT
Start: 2022-06-16

## 2022-06-19 DIAGNOSIS — R10.10 UPPER ABDOMINAL PAIN: ICD-10-CM

## 2022-06-19 RX ORDER — OMEPRAZOLE 40 MG/1
CAPSULE, DELAYED RELEASE ORAL
Qty: 180 CAPSULE | Refills: 1 | Status: SHIPPED | OUTPATIENT
Start: 2022-06-19

## 2022-06-24 DIAGNOSIS — G43.701 CHRONIC MIGRAINE WITHOUT AURA WITH STATUS MIGRAINOSUS, NOT INTRACTABLE: ICD-10-CM

## 2022-06-24 RX ORDER — SUMATRIPTAN 25 MG/1
25 TABLET, FILM COATED ORAL ONCE AS NEEDED
Qty: 9 TABLET | Refills: 0 | Status: SHIPPED | OUTPATIENT
Start: 2022-06-24 | End: 2022-07-28

## 2022-06-27 DIAGNOSIS — M54.42 CHRONIC MIDLINE LOW BACK PAIN WITH LEFT-SIDED SCIATICA: ICD-10-CM

## 2022-06-27 DIAGNOSIS — G89.29 CHRONIC MIDLINE LOW BACK PAIN WITH LEFT-SIDED SCIATICA: ICD-10-CM

## 2022-06-29 NOTE — TELEPHONE ENCOUNTER
Spoke with a patient, she is taking 1 cap at night before she go to sleep and help her legs and hip, she do not need or want to take more then 1 dayron daily

## 2022-06-30 ENCOUNTER — IMMUNIZATIONS (OUTPATIENT)
Dept: FAMILY MEDICINE CLINIC | Facility: CLINIC | Age: 72
End: 2022-06-30
Payer: COMMERCIAL

## 2022-06-30 DIAGNOSIS — Z23 NEED FOR COVID-19 VACCINE: Primary | ICD-10-CM

## 2022-06-30 PROCEDURE — 0054A PR IMM ADMN SARSCOV2 30MCG/0.3ML TRIS-SUCROSE BST: CPT

## 2022-06-30 PROCEDURE — 91305 PR SARSCOV2 VACCINE 30MCG/0.3ML TRIS-SUCROSE IM USE: CPT

## 2022-06-30 RX ORDER — GABAPENTIN 300 MG/1
300 CAPSULE ORAL
Qty: 90 CAPSULE | Refills: 1 | Status: SHIPPED | OUTPATIENT
Start: 2022-06-30

## 2022-06-30 NOTE — TELEPHONE ENCOUNTER
Jacoob Offer you  Script sent for 90 days as requested with updated directions simply stating 1 daily at bedtime  unknown

## 2022-07-21 DIAGNOSIS — E78.5 HYPERLIPIDEMIA, UNSPECIFIED HYPERLIPIDEMIA TYPE: ICD-10-CM

## 2022-07-21 RX ORDER — ATORVASTATIN CALCIUM 80 MG/1
TABLET, FILM COATED ORAL
Qty: 90 TABLET | Refills: 1 | Status: SHIPPED | OUTPATIENT
Start: 2022-07-21

## 2022-07-28 DIAGNOSIS — G43.701 CHRONIC MIGRAINE WITHOUT AURA WITH STATUS MIGRAINOSUS, NOT INTRACTABLE: ICD-10-CM

## 2022-07-28 RX ORDER — SUMATRIPTAN 25 MG/1
25 TABLET, FILM COATED ORAL ONCE AS NEEDED
Qty: 9 TABLET | Refills: 0 | Status: SHIPPED | OUTPATIENT
Start: 2022-07-28 | End: 2022-08-24

## 2022-08-10 DIAGNOSIS — I10 ESSENTIAL HYPERTENSION: ICD-10-CM

## 2022-08-12 RX ORDER — PROPRANOLOL HYDROCHLORIDE 120 MG/1
120 CAPSULE, EXTENDED RELEASE ORAL DAILY
Qty: 90 CAPSULE | Refills: 0 | Status: SHIPPED | OUTPATIENT
Start: 2022-08-12

## 2022-08-24 DIAGNOSIS — G43.701 CHRONIC MIGRAINE WITHOUT AURA WITH STATUS MIGRAINOSUS, NOT INTRACTABLE: ICD-10-CM

## 2022-08-24 RX ORDER — SUMATRIPTAN 25 MG/1
25 TABLET, FILM COATED ORAL ONCE AS NEEDED
Qty: 9 TABLET | Refills: 0 | Status: SHIPPED | OUTPATIENT
Start: 2022-08-24 | End: 2022-09-24

## 2022-10-24 ENCOUNTER — RA CDI HCC (OUTPATIENT)
Dept: OTHER | Facility: HOSPITAL | Age: 72
End: 2022-10-24

## 2022-10-24 NOTE — PROGRESS NOTES
[facility]  coding opportunities       Chart reviewed, no opportunity found:   [address]        Patients Insurance     Medicare Insurance: [employer]age

## 2022-10-28 ENCOUNTER — TELEPHONE (OUTPATIENT)
Dept: ADMINISTRATIVE | Facility: OTHER | Age: 72
End: 2022-10-28

## 2022-10-28 ENCOUNTER — OFFICE VISIT (OUTPATIENT)
Dept: FAMILY MEDICINE CLINIC | Facility: CLINIC | Age: 72
End: 2022-10-28

## 2022-10-28 VITALS
OXYGEN SATURATION: 97 % | HEIGHT: 68 IN | TEMPERATURE: 97.8 F | HEART RATE: 58 BPM | DIASTOLIC BLOOD PRESSURE: 70 MMHG | BODY MASS INDEX: 25.16 KG/M2 | SYSTOLIC BLOOD PRESSURE: 128 MMHG | WEIGHT: 166 LBS

## 2022-10-28 DIAGNOSIS — Z23 NEED FOR SHINGLES VACCINE: ICD-10-CM

## 2022-10-28 DIAGNOSIS — E66.3 OVERWEIGHT (BMI 25.0-29.9): ICD-10-CM

## 2022-10-28 DIAGNOSIS — E78.5 HYPERLIPIDEMIA, UNSPECIFIED HYPERLIPIDEMIA TYPE: ICD-10-CM

## 2022-10-28 DIAGNOSIS — G43.909 MIGRAINE WITHOUT STATUS MIGRAINOSUS, NOT INTRACTABLE, UNSPECIFIED MIGRAINE TYPE: ICD-10-CM

## 2022-10-28 DIAGNOSIS — Z23 NEED FOR INFLUENZA VACCINATION: ICD-10-CM

## 2022-10-28 DIAGNOSIS — Z23 ENCOUNTER FOR IMMUNIZATION: ICD-10-CM

## 2022-10-28 DIAGNOSIS — Z00.00 MEDICARE ANNUAL WELLNESS VISIT, SUBSEQUENT: Primary | ICD-10-CM

## 2022-10-28 DIAGNOSIS — M54.16 LUMBAR RADICULOPATHY: ICD-10-CM

## 2022-10-28 DIAGNOSIS — Z12.83 SKIN CANCER SCREENING: ICD-10-CM

## 2022-10-28 DIAGNOSIS — F41.9 ANXIETY: ICD-10-CM

## 2022-10-28 DIAGNOSIS — K29.70 GASTRITIS, PRESENCE OF BLEEDING UNSPECIFIED, UNSPECIFIED CHRONICITY, UNSPECIFIED GASTRITIS TYPE: ICD-10-CM

## 2022-10-28 DIAGNOSIS — Z78.0 POSTMENOPAUSAL: ICD-10-CM

## 2022-10-28 RX ORDER — ZOSTER VACCINE RECOMBINANT, ADJUVANTED 50 MCG/0.5
0.5 KIT INTRAMUSCULAR ONCE
Qty: 1 EACH | Refills: 1 | Status: SHIPPED | OUTPATIENT
Start: 2022-10-28 | End: 2022-10-28

## 2022-10-28 NOTE — PROGRESS NOTES
Assessment and Plan:     Problem List Items Addressed This Visit        Digestive    Gastritis     Well controlled on omeprazole 40 mg twice daily  We discussed trying to wean to a lower dose if possible  Cardiovascular and Mediastinum    Migraine     Stable  Continue propranolol 120 mg daily and Fioricet or Imitrex as needed  She was encouraged to use Imitrex 1st line  Nervous and Auditory    Lumbar radiculopathy     Controlled  Continue gabapentin 300 mg at bedtime  Other    Hyperlipidemia     Currently on atorvastatin 80 mg daily  Will continue to monitor  Anxiety     Well controlled  Continue on Lexapro 20 mg daily  Overweight (BMI 25 0-29  9)     BMI Counseling: Body mass index is 25 24 kg/m²  The BMI is above normal  Nutrition recommendations include 3-5 servings of fruits/vegetables daily  Exercise recommendations include exercising 3-5 times per week  Other Visit Diagnoses     Medicare annual wellness visit, subsequent    -  Primary    Need for influenza vaccination        Encounter for immunization        Relevant Orders    influenza vaccine, high-dose, PF 0 7 mL (FLUZONE HIGH-DOSE) (Completed)    Need for shingles vaccine        Skin cancer screening        Relevant Orders    Ambulatory Referral to Dermatology    Postmenopausal        Relevant Orders    DXA bone density spine hip and pelvis          Depression Screening and Follow-up Plan: Patient was screened for depression during today's encounter  They screened negative with a PHQ-2 score of 0  Preventive health issues were discussed with patient, and age appropriate screening tests were ordered as noted in patient's After Visit Summary  Personalized health advice and appropriate referrals for health education or preventive services given if needed, as noted in patient's After Visit Summary       History of Present Illness:     Patient presents for a Medicare Wellness Visit    The patient reports that recently anxiety/depression level has been well-controlled  Daily medication includes for Lexapro 20 mg daily  The patient denies panic attacks  The patient denies SI/HI  Today's PHQ9 score is 2  Today's MALIK-7 score is 0  The patient continues with atorvastatin 80 milligrams daily  Last LDL was 112 in May 2022  The patient denies myalgias  The patient reports that migraines have been stable  Migraines occur about 2-3 times per month  Fioricet or Imitrex 25 mg is used for treatment  The patient reports that GERD has been well-controlled with omeprazole 40 mg twice daily  She reports that her chronic lower back pain has been controlled with gabapentin 300 mg at bedtime  The patient reports that current blood pressure medications include propranolol 120 mg daily  Blood pressure readings at home are approximately 120/70s  The patient denies symptoms of poor control such as chest pain, shortness of breath, leg swelling, vision changes, headaches (besides migraines from perfumes, etc ), or dizziness  The patient reports green tea for caffeine intake and unlimited salt intake  Patient Care Team:  Brendan Diana PA-C as PCP - General (Family Medicine)  Jan Mills DO     Review of Systems:     Review of Systems   Constitutional: Negative for chills and fever  HENT: Negative for rhinorrhea and sore throat  Eyes: Negative for visual disturbance  Respiratory: Negative for cough, shortness of breath and wheezing  Cardiovascular: Positive for palpitations (chronic and unchanged)  Negative for chest pain and leg swelling  Gastrointestinal: Negative for abdominal pain, constipation, diarrhea, nausea and vomiting  Endocrine: Negative for polydipsia and polyuria  Genitourinary: Negative for dysuria and frequency  Musculoskeletal: Negative for arthralgias and myalgias  Skin: Negative for rash  Neurological: Positive for headaches   Negative for dizziness  Hematological: Does not bruise/bleed easily  Psychiatric/Behavioral: Negative for dysphoric mood  The patient is not nervous/anxious  Problem List:     Patient Active Problem List   Diagnosis   • Abnormal EKG   • Benign neoplasm of colon   • Breast tenderness in female   • Neck pain   • Hearing loss   • Displacement of thoracic intervertebral disc without myelopathy   • Hyperlipidemia   • Irritable bowel syndrome   • Levoscoliosis   • Lightheadedness   • Chronic low back pain   • Memory difficulties   • Migraine   • Onychomycosis   • Osteoarthritis of both hands   • Palpitations   • Skin lesions   • Anxiety   • Gastritis   • Overweight (BMI 25 0-29  9)   • Lumbar radiculopathy   • Spinal stenosis of lumbar region      Past Medical and Surgical History:     Past Medical History:   Diagnosis Date   • SHAY (acute kidney injury) ([facility] [place] [facility] ) 2/3/2021   • Anxiety 2018   • Arthritis    • Back pain    • Depression    • GERD (gastroesophageal reflux disease)    • Headache(784 0)    • Hyperlipidemia      Past Surgical History:   Procedure Laterality Date   • APPENDECTOMY     • COLONOSCOPY     • HYSTERECTOMY        Family History:     Family History   Problem Relation Age of Onset   • Heart failure Mother    • Arthritis Mother    • Coronary artery disease Mother    • Lung cancer Father    • Cancer Father    • Multiple sclerosis Brother    • Bipolar disorder Son    • Lupus Daughter    • Breast cancer Maternal Aunt    • Asthma Son       Social History:     Social History     Socioeconomic History   • Marital status: /Civil Union     Spouse name: None   • Number of children: None   • Years of education: None   • Highest education level: None   Occupational History   • None   Tobacco Use   • Smoking status: Former Smoker     Packs/day: 1 00     Years: 20 00     Pack years: 20 00     Quit date:      Years since quittin 8   • Smokeless tobacco: Never Used   Vaping Use   • Vaping Use: Never used   Substance and Sexual Activity   • Alcohol use: Yes     Alcohol/week: 3 0 standard drinks     Types: 3 Glasses of wine per week     Comment: 1/2 glass of wine at a time   • Drug use: Never   • Sexual activity: None   Other Topics Concern   • None   Social History Narrative   • None     Social Determinants of Health     Financial Resource Strain: Low Risk    • Difficulty of Paying Living Expenses: Not very hard   Food Insecurity: Not on file   Transportation Needs: No Transportation Needs   • Lack of Transportation (Medical): No   • Lack of Transportation (Non-Medical): No   Physical Activity: Not on file   Stress: Not on file   Social Connections: Not on file   Intimate Partner Violence: Not on file   Housing Stability: Not on file      Medications and Allergies:     Current Outpatient Medications   Medication Sig Dispense Refill   • atorvastatin (LIPITOR) 80 mg tablet TAKE 1 TABLET BY MOUTH EVERY DAY 90 tablet 1   • butalbital-acetaminophen-caffeine (FIORICET,ESGIC) -40 mg per tablet Take 1 tablet by mouth every 4 (four) hours as needed for headaches 20 tablet 0   • escitalopram (LEXAPRO) 20 mg tablet TAKE 1 TABLET BY MOUTH EVERY DAY 90 tablet 1   • gabapentin (NEURONTIN) 300 mg capsule Take 1 capsule (300 mg total) by mouth daily at bedtime 90 capsule 1   • Multiple Vitamins-Minerals (MULTIVITAL PO) Take 1 tablet by mouth daily     • omeprazole (PriLOSEC) 40 MG capsule TAKE 1 CAPSULE BY MOUTH 2 TIMES A DAY HALF AN HOUR PRIOR TO MEALS 180 capsule 1   • propranolol (INDERAL LA) 120 mg 24 hr capsule Take 1 capsule (120 mg total) by mouth daily 90 capsule 0   • SUMAtriptan (IMITREX) 25 mg tablet TAKE 1 TABLET (25 MG TOTAL) BY MOUTH ONCE AS NEEDED FOR MIGRAINE FOR UP TO 1 DOSE 9 tablet 2     No current facility-administered medications for this visit       No Known Allergies   Immunizations:     Immunization History   Administered Date(s) Administered   • COVID-19 PFIZER VACCINE 0 3 ML IM 03/10/2021, 04/07/2021   • COVID-19 Pfizer vac (Jose-sucrose, gray cap) 12 yr+ IM 06/30/2022   • INFLUENZA 01/01/2006   • Influenza Quadrivalent Preservative Free 3 years and older IM 10/17/2014, 10/01/2015   • Influenza Split High Dose Preservative Free IM 01/09/2018   • Influenza, high dose seasonal 0 7 mL 10/23/2019, 10/21/2020, 10/25/2021, 10/28/2022   • Influenza, seasonal, injectable 10/18/2011, 12/06/2013   • Pneumococcal Conjugate 13-Valent 01/09/2018   • Pneumococcal Polysaccharide PPV23 03/25/2019   • Tdap 01/31/2014, 01/29/2021      Health Maintenance:         Topic Date Due   • DXA SCAN  Never done   • Breast Cancer Screening: Mammogram  10/24/2023   • Colorectal Cancer Screening  03/16/2027   • Hepatitis C Screening  Completed         Topic Date Due   • COVID-19 Vaccine (4 - Booster for Barrie Bryant series) 10/30/2022      Medicare Screening Tests and Risk Assessments:     Fortunato Griffin is here for her Subsequent Wellness visit  Health Risk Assessment:   Patient rates overall health as fair  Patient feels that their physical health rating is slightly worse  Patient is satisfied with their life  Eyesight was rated as same  Hearing was rated as same  Patient feels that their emotional and mental health rating is same  Patients states they are never, rarely angry  Patient states they are sometimes unusually tired/fatigued  Pain experienced in the last 7 days has been some  Patient's pain rating has been 5/10  Patient states that she has experienced weight loss or gain in last 6 months  Depression Screening:   PHQ-2 Score: 0      Fall Risk Screening: In the past year, patient has experienced: no history of falling in past year      Urinary Incontinence Screening:   Patient has not leaked urine accidently in the last six months  Home Safety:  Patient has trouble with stairs inside or outside of their home  Patient has working smoke alarms and has working carbon monoxide detector  Home safety hazards include: none  Nutrition:   Current diet is Regular  Medications:   Patient is not currently taking any over-the-counter supplements  Patient is able to manage medications  Activities of Daily Living (ADLs)/Instrumental Activities of Daily Living (IADLs):   Walk and transfer into and out of bed and chair?: Yes  Dress and groom yourself?: Yes    Bathe or shower yourself?: Yes    Feed yourself? Yes  Do your laundry/housekeeping?: Yes  Manage your money, pay your bills and track your expenses?: Yes  Make your own meals?: Yes    Do your own shopping?: Yes    Previous Hospitalizations:   Any hospitalizations or ED visits within the last 12 months?: No      Advance Care Planning:   Living will: No    Durable POA for healthcare: No    Advanced directive: No    Five wishes given: Yes      Cognitive Screening:   Provider or family/friend/caregiver concerned regarding cognition?: No    PREVENTIVE SCREENINGS      Cardiovascular Screening:    General: Screening Not Indicated and History Lipid Disorder      Diabetes Screening:     General: Screening Current      Colorectal Cancer Screening:     General: Screening Current      Breast Cancer Screening:     General: Screening Current      Cervical Cancer Screening:    General: Screening Not Indicated      Osteoporosis Screening:      Due for: DXA Axial      Abdominal Aortic Aneurysm (AAA) Screening:        General: Screening Not Indicated      Lung Cancer Screening:     General: Screening Not Indicated      Hepatitis C Screening:    General: Screening Current    Screening, Brief Intervention, and Referral to Treatment (SBIRT)    Screening  Typical number of drinks in a day: 1  Typical number of drinks in a week: 2  Interpretation: Low risk drinking behavior  AUDIT-C Screenin) How often did you have a drink containing alcohol in the past year? 2 to 3 times a week  2) How many drinks did you have on a typical day when you were drinking in the past year?  1 to 2  3) How often did you have 6 or more drinks on one occasion in the past year? never    AUDIT-C Score: 3  Interpretation: Score 3-12 (female): POSITIVE screen for alcohol misuse    AUDIT Screenin) How often during the last year have you found that you were not able to stop drinking once you had started? 0 - never  5) How often during the last year have you failed to do what was normally expected from you because of drinking? 0 - never  6) How often during the last year have you needed a first drink in the morning to get yourself going after a heavy drinking session? 0 - never  7) How often during the last year have you had a feeling of guilt or remorse after drinking? 0 - never  8) How often during the last year have you been unable to remember what happened the night before because you had been drinking? 0 - never  9) Have you or someone else been injured as a result of your drinking? 0 - no  10) Has a relative or friend or a doctor or another health worker been concerned about your drinking or suggested you cut down? 0 - no    AUDIT Score: 3  Interpretation: Low risk alcohol consumption    Single Item Drug Screening:  How often have you used an illegal drug (including marijuana) or a prescription medication for non-medical reasons in the past year? never    Single Item Drug Screen Score: 0  Interpretation: Negative screen for possible drug use disorder    No exam data present     Physical Exam:     /70 (BP Location: Left arm, Patient Position: Sitting, Cuff Size: Large)   Pulse 58   Temp 97 8 °F (36 6 °C)   Ht 5' 8" (1 727 m)   Wt 75 3 kg (166 lb)   SpO2 97%   BMI 25 24 kg/m²     Physical Exam  Vitals reviewed  Constitutional:       General: She is not in acute distress  Appearance: Normal appearance  She is well-developed  She is not ill-appearing  HENT:      Head: Normocephalic and atraumatic        Right Ear: Tympanic membrane, ear canal and external ear normal       Left Ear: Tympanic membrane, ear canal and external ear normal       Nose: Nose normal  No congestion  Mouth/Throat:      Mouth: Mucous membranes are moist       Pharynx: No oropharyngeal exudate  Eyes:      Conjunctiva/sclera: Conjunctivae normal       Pupils: Pupils are equal, round, and reactive to light  Neck:      Thyroid: No thyromegaly  Vascular: No carotid bruit  Cardiovascular:      Rate and Rhythm: Normal rate and regular rhythm  Pulses: Normal pulses  Heart sounds: Normal heart sounds  No murmur heard  Pulmonary:      Effort: Pulmonary effort is normal       Breath sounds: Normal breath sounds  No wheezing, rhonchi or rales  Abdominal:      General: Bowel sounds are normal  There is no distension  Palpations: Abdomen is soft  There is no mass  Tenderness: There is no abdominal tenderness  Musculoskeletal:      Cervical back: Normal range of motion and neck supple  Right lower leg: No edema  Left lower leg: No edema  Lymphadenopathy:      Cervical: No cervical adenopathy  Skin:     General: Skin is warm and dry  Findings: No rash  Neurological:      Mental Status: She is alert  Sensory: No sensory deficit  Comments: 5/5 strength in UE and LE   Psychiatric:         Mood and Affect: Mood normal          Behavior: Behavior normal          Thought Content:  Thought content normal          Judgment: Judgment normal           Andre Terrazas PA-C

## 2022-10-28 NOTE — PATIENT INSTRUCTIONS
Medicare Preventive Visit Patient Instructions  Thank you for completing your Welcome to Medicare Visit or Medicare Annual Wellness Visit today  Your next wellness visit will be due in one year (10/29/2023)  The screening/preventive services that you may require over the next 5-10 years are detailed below  Some tests may not apply to you based off risk factors and/or age  Screening tests ordered at today's visit but not completed yet may show as past due  Also, please note that scanned in results may not display below  Preventive Screenings:  Service Recommendations Previous Testing/Comments   Colorectal Cancer Screening  * Colonoscopy    * Fecal Occult Blood Test (FOBT)/Fecal Immunochemical Test (FIT)  * Fecal DNA/Cologuard Test  * Flexible Sigmoidoscopy Age: 39-70 years old   Colonoscopy: every 10 years (may be performed more frequently if at higher risk)  OR  FOBT/FIT: every 1 year  OR  Cologuard: every 3 years  OR  Sigmoidoscopy: every 5 years  Screening may be recommended earlier than age 39 if at higher risk for colorectal cancer  Also, an individualized decision between you and your healthcare provider will decide whether screening between the ages of 74-80 would be appropriate  Colonoscopy: 03/16/2022  FOBT/FIT: Not on file  Cologuard: Not on file  Sigmoidoscopy: Not on file    Screening Current     Breast Cancer Screening Age: 36 years old  Frequency: every 1-2 years  Not required if history of left and right mastectomy Mammogram: 10/24/2022    Screening Current   Cervical Cancer Screening Between the ages of 21-29, pap smear recommended once every 3 years  Between the ages of 33-67, can perform pap smear with HPV co-testing every 5 years     Recommendations may differ for women with a history of total hysterectomy, cervical cancer, or abnormal pap smears in past  Pap Smear: Not on file    Screening Not Indicated   Hepatitis C Screening Once for adults born between 1945 and 1965  More frequently in patients at high risk for Hepatitis C Hep C Antibody: 01/15/2018    Screening Current   Diabetes Screening 1-2 times per year if you're at risk for diabetes or have pre-diabetes Fasting glucose: 92 mg/dL (2/3/2021)  A1C: No results in last 5 years (No results in last 5 years)  Screening Current   Cholesterol Screening Once every 5 years if you don't have a lipid disorder  May order more often based on risk factors  Lipid panel: 05/10/2022    Screening Not Indicated  History Lipid Disorder     Other Preventive Screenings Covered by Medicare:  1  Abdominal Aortic Aneurysm (AAA) Screening: covered once if your at risk  You're considered to be at risk if you have a family history of AAA  2  Lung Cancer Screening: covers low dose CT scan once per year if you meet all of the following conditions: (1) Age 50-69; (2) No signs or symptoms of lung cancer; (3) Current smoker or have quit smoking within the last 15 years; (4) You have a tobacco smoking history of at least 20 pack years (packs per day multiplied by number of years you smoked); (5) You get a written order from a healthcare provider  3  Glaucoma Screening: covered annually if you're considered high risk: (1) You have diabetes OR (2) Family history of glaucoma OR (3)  aged 48 and older OR (3)  American aged 72 and older  3  Osteoporosis Screening: covered every 2 years if you meet one of the following conditions: (1) You're estrogen deficient and at risk for osteoporosis based off medical history and other findings; (2) Have a vertebral abnormality; (3) On glucocorticoid therapy for more than 3 months; (4) Have primary hyperparathyroidism; (5) On osteoporosis medications and need to assess response to drug therapy  · Last bone density test (DXA Scan): Not on file  5  HIV Screening: covered annually if you're between the age of 12-76  Also covered annually if you are younger than 13 and older than 72 with risk factors for HIV infection  For pregnant patients, it is covered up to 3 times per pregnancy  Immunizations:  Immunization Recommendations   Influenza Vaccine Annual influenza vaccination during flu season is recommended for all persons aged >= 6 months who do not have contraindications   Pneumococcal Vaccine   * Pneumococcal conjugate vaccine = PCV13 (Prevnar 13), PCV15 (Vaxneuvance), PCV20 (Prevnar 20)  * Pneumococcal polysaccharide vaccine = PPSV23 (Pneumovax) Adults 25-60 years old: 1-3 doses may be recommended based on certain risk factors  Adults 72 years old: 1-2 doses may be recommended based off what pneumonia vaccine you previously received   Hepatitis B Vaccine 3 dose series if at intermediate or high risk (ex: diabetes, end stage renal disease, liver disease)   Tetanus (Td) Vaccine - COST NOT COVERED BY MEDICARE PART B Following completion of primary series, a booster dose should be given every 10 years to maintain immunity against tetanus  Td may also be given as tetanus wound prophylaxis  Tdap Vaccine - COST NOT COVERED BY MEDICARE PART B Recommended at least once for all adults  For pregnant patients, recommended with each pregnancy  Shingles Vaccine (Shingrix) - COST NOT COVERED BY MEDICARE PART B  2 shot series recommended in those aged 48 and above     Health Maintenance Due:      Topic Date Due   • DXA SCAN  Never done   • Breast Cancer Screening: Mammogram  10/24/2023   • Colorectal Cancer Screening  03/16/2027   • Hepatitis C Screening  Completed     Immunizations Due:      Topic Date Due   • Influenza Vaccine (1) 09/01/2022   • COVID-19 Vaccine (4 - Booster for Sood Peter series) 10/30/2022     Advance Directives   What are advance directives? Advance directives are legal documents that state your wishes and plans for medical care  These plans are made ahead of time in case you lose your ability to make decisions for yourself   Advance directives can apply to any medical decision, such as the treatments you want, and if you want to donate organs  What are the types of advance directives? There are many types of advance directives, and each state has rules about how to use them  You may choose a combination of any of the following:  · Living will: This is a written record of the treatment you want  You can also choose which treatments you do not want, which to limit, and which to stop at a certain time  This includes surgery, medicine, IV fluid, and tube feedings  · Durable power of  for healthcare [facility]): This is a written record that states who you want to make healthcare choices for you when you are unable to make them for yourself  This person, called a proxy, is usually a family member or a friend  You may choose more than 1 proxy  · Do not resuscitate (DNR) order:  A DNR order is used in case your heart stops beating or you stop breathing  It is a request not to have certain forms of treatment, such as CPR  A DNR order may be included in other types of advance directives  · Medical directive: This covers the care that you want if you are in a coma, near death, or unable to make decisions for yourself  You can list the treatments you want for each condition  Treatment may include pain medicine, surgery, blood transfusions, dialysis, IV or tube feedings, and a ventilator (breathing machine)  · Values history: This document has questions about your views, beliefs, and how you feel and think about life  This information can help others choose the care that you would choose  Why are advance directives important? An advance directive helps you control your care  Although spoken wishes may be used, it is better to have your wishes written down  Spoken wishes can be misunderstood, or not followed  Treatments may be given even if you do not want them  An advance directive may make it easier for your family to make difficult choices about your care     Weight Management   Why it is important to manage your weight:  Being overweight increases your risk of health conditions such as heart disease, high blood pressure, type 2 diabetes, and certain types of cancer  It can also increase your risk for osteoarthritis, sleep apnea, and other respiratory problems  Aim for a slow, steady weight loss  Even a small amount of weight loss can lower your risk of health problems  How to lose weight safely:  A safe and healthy way to lose weight is to eat fewer calories and get regular exercise  You can lose up about 1 pound a week by decreasing the number of calories you eat by 500 calories each day  Healthy meal plan for weight management:  A healthy meal plan includes a variety of foods, contains fewer calories, and helps you stay healthy  A healthy meal plan includes the following:  · Eat whole-grain foods more often  A healthy meal plan should contain fiber  Fiber is the part of grains, fruits, and vegetables that is not broken down by your body  Whole-grain foods are healthy and provide extra fiber in your diet  Some examples of whole-grain foods are whole-wheat breads and pastas, oatmeal, brown rice, and bulgur  · Eat a variety of vegetables every day  Include dark, leafy greens such as spinach, kale, bhavna greens, and mustard greens  Eat yellow and orange vegetables such as carrots, sweet potatoes, and winter squash  · Eat a variety of fruits every day  Choose fresh or canned fruit (canned in its own juice or light syrup) instead of juice  Fruit juice has very little or no fiber  · Eat low-fat dairy foods  Drink fat-free (skim) milk or 1% milk  Eat fat-free yogurt and low-fat cottage cheese  Try low-fat cheeses such as mozzarella and other reduced-fat cheeses  · Choose meat and other protein foods that are low in fat  Choose beans or other legumes such as split peas or lentils  Choose fish, skinless poultry (chicken or turkey), or lean cuts of red meat (beef or pork)   Before you cook meat or poultry, cut off any visible fat  · Use less fat and oil  Try baking foods instead of frying them  Add less fat, such as margarine, sour cream, regular salad dressing and mayonnaise to foods  Eat fewer high-fat foods  Some examples of high-fat foods include french fries, doughnuts, ice cream, and cakes  · Eat fewer sweets  Limit foods and drinks that are high in sugar  This includes candy, cookies, regular soda, and sweetened drinks  Exercise:  Exercise at least 30 minutes per day on most days of the week  Some examples of exercise include walking, biking, dancing, and swimming  You can also fit in more physical activity by taking the stairs instead of the elevator or parking farther away from stores  Ask your healthcare provider about the best exercise plan for you  Alcohol Use and Your Health    Drinking too much can harm your health  Excessive alcohol use leads to about 88,000 death in the [place] each year, and shortens the life of those who diet by almost 30 years  Further, excessive drinking cost the economy $249 billion in 2010  Most excessive drinkers are not alcohol dependent  Excessive alcohol use has immediate effects that increase the risk of many harmful health conditions  These are most often the result of binge drinking  Over time, excessive alcohol use can lead to the development of chronic diseases and other series health problems  What is considered a "drink"? Excessive alcohol use includes:  · Binge Drinking: For women, 4 or more drinks consumed on one occasion  For men, 5 or more drinks consumed on one occasion  · Heavy Drinking: For women, 8 or more drinks per week   For men, 15 or more drinks per week  · Any alcohol used by pregnant women  · Any alcohol used by those under the age of 21 years    If you choose to drink, do so in moderation:  · Do not drink at all if you are under the age of 24, or if you are or may be pregnant, or have health problems that could be made worse by drinking  · For women, up to 1 drink per day  · For men, up to 2 drinks a day    No one should begin drinking or drink more frequently based on potential health benefits    Short-Term Health Risks:  · Injuries: motor vehicle crashes, falls, drownings, burns  · Violence: homicide, suicide, sexual assault, intimate partner violence  · Alcohol poisoning  · Reproductive health: risky sexual behaviors, unintended prengnacy, sexually transmitted diseases, miscarriage, stillbirth, fetal alcohol syndrome    Long-Term Health Risks:  · Chronic diseases: high blood pressure, heart disease, stroke, liver disease, digestive problems  · Cancers: breast, mouth and throat, liver, colon  · Learning and memory problems: dementia, poor school performance  · Mental health: depression, anxiety, insomnia  · Social problems: lost productivity, family problems, unemployment  · Alcohol dependence    For support and more information:  · Substance Abuse and [facility] [address] [place]  Web Address: https://[employer]/    · Alcoholics Anonymous        Web Address: http://[employer]/    https://www cdc gov/alcohol/fact-sheets/alcohol-use htm     © Copyright [employer] 2018 Information is for End User's use only and may not be sold, redistributed or otherwise used for commercial purposes  All illustrations and images included in CareNotes® are the copyrighted property of [employer] , [employer]  or [facility] Preventive Visit Patient Instructions  Thank you for completing your Welcome to Medicare Visit or Medicare Annual Wellness Visit today  Your next wellness visit will be due in one year (10/29/2023)  The screening/preventive services that you may require over the next 5-10 years are detailed below  Some tests may not apply to you based off risk factors and/or age  Screening tests ordered at today's visit but not completed yet may show as past due   Also, please note that scanned in results may not display below  Preventive Screenings:  Service Recommendations Previous Testing/Comments   Colorectal Cancer Screening  * Colonoscopy    * Fecal Occult Blood Test (FOBT)/Fecal Immunochemical Test (FIT)  * Fecal DNA/Cologuard Test  * Flexible Sigmoidoscopy Age: 39-70 years old   Colonoscopy: every 10 years (may be performed more frequently if at higher risk)  OR  FOBT/FIT: every 1 year  OR  Cologuard: every 3 years  OR  Sigmoidoscopy: every 5 years  Screening may be recommended earlier than age 39 if at higher risk for colorectal cancer  Also, an individualized decision between you and your healthcare provider will decide whether screening between the ages of 74-80 would be appropriate  Colonoscopy: 03/16/2022  FOBT/FIT: Not on file  Cologuard: Not on file  Sigmoidoscopy: Not on file    Screening Current     Breast Cancer Screening Age: 38 years old  Frequency: every 1-2 years  Not required if history of left and right mastectomy Mammogram: 10/24/2022    Screening Current   Cervical Cancer Screening Between the ages of 21-29, pap smear recommended once every 3 years  Between the ages of 33-67, can perform pap smear with HPV co-testing every 5 years  Recommendations may differ for women with a history of total hysterectomy, cervical cancer, or abnormal pap smears in past  Pap Smear: Not on file    Screening Not Indicated   Hepatitis C Screening Once for adults born between 1945 and 1965  More frequently in patients at high risk for Hepatitis C Hep C Antibody: 01/15/2018    Screening Current   Diabetes Screening 1-2 times per year if you're at risk for diabetes or have pre-diabetes Fasting glucose: 92 mg/dL (2/3/2021)  A1C: No results in last 5 years (No results in last 5 years)  Screening Current   Cholesterol Screening Once every 5 years if you don't have a lipid disorder  May order more often based on risk factors   Lipid panel: 05/10/2022    Screening Not Indicated  History Lipid Disorder     Other Preventive Screenings Covered by Medicare:  6  Abdominal Aortic Aneurysm (AAA) Screening: covered once if your at risk  You're considered to be at risk if you have a family history of AAA  7  Lung Cancer Screening: covers low dose CT scan once per year if you meet all of the following conditions: (1) Age 50-69; (2) No signs or symptoms of lung cancer; (3) Current smoker or have quit smoking within the last 15 years; (4) You have a tobacco smoking history of at least 20 pack years (packs per day multiplied by number of years you smoked); (5) You get a written order from a healthcare provider  8  Glaucoma Screening: covered annually if you're considered high risk: (1) You have diabetes OR (2) Family history of glaucoma OR (3)  aged 48 and older OR (3)  American aged 72 and older  5  Osteoporosis Screening: covered every 2 years if you meet one of the following conditions: (1) You're estrogen deficient and at risk for osteoporosis based off medical history and other findings; (2) Have a vertebral abnormality; (3) On glucocorticoid therapy for more than 3 months; (4) Have primary hyperparathyroidism; (5) On osteoporosis medications and need to assess response to drug therapy  · Last bone density test (DXA Scan): Not on file  10  HIV Screening: covered annually if you're between the age of 12-76  Also covered annually if you are younger than 13 and older than 72 with risk factors for HIV infection  For pregnant patients, it is covered up to 3 times per pregnancy      Immunizations:  Immunization Recommendations   Influenza Vaccine Annual influenza vaccination during flu season is recommended for all persons aged >= 6 months who do not have contraindications   Pneumococcal Vaccine   * Pneumococcal conjugate vaccine = PCV13 (Prevnar 13), PCV15 (Vaxneuvance), PCV20 (Prevnar 20)  * Pneumococcal polysaccharide vaccine = PPSV23 (Pneumovax) Adults 25-60 years old: 1-3 doses may be recommended based on certain risk factors  Adults 72 years old: 1-2 doses may be recommended based off what pneumonia vaccine you previously received   Hepatitis B Vaccine 3 dose series if at intermediate or high risk (ex: diabetes, end stage renal disease, liver disease)   Tetanus (Td) Vaccine - COST NOT COVERED BY MEDICARE PART B Following completion of primary series, a booster dose should be given every 10 years to maintain immunity against tetanus  Td may also be given as tetanus wound prophylaxis  Tdap Vaccine - COST NOT COVERED BY MEDICARE PART B Recommended at least once for all adults  For pregnant patients, recommended with each pregnancy  Shingles Vaccine (Shingrix) - COST NOT COVERED BY MEDICARE PART B  2 shot series recommended in those aged 48 and above     Health Maintenance Due:      Topic Date Due   • DXA SCAN  Never done   • Breast Cancer Screening: Mammogram  10/24/2023   • Colorectal Cancer Screening  03/16/2027   • Hepatitis C Screening  Completed     Immunizations Due:      Topic Date Due   • Influenza Vaccine (1) 09/01/2022   • COVID-19 Vaccine (4 - Booster for Sood Peter series) 10/30/2022     Advance Directives   What are advance directives? Advance directives are legal documents that state your wishes and plans for medical care  These plans are made ahead of time in case you lose your ability to make decisions for yourself  Advance directives can apply to any medical decision, such as the treatments you want, and if you want to donate organs  What are the types of advance directives? There are many types of advance directives, and each state has rules about how to use them  You may choose a combination of any of the following:  · Living will: This is a written record of the treatment you want  You can also choose which treatments you do not want, which to limit, and which to stop at a certain time  This includes surgery, medicine, IV fluid, and tube feedings     · Durable power of  for [facility]): This is a written record that states who you want to make healthcare choices for you when you are unable to make them for yourself  This person, called a proxy, is usually a family member or a friend  You may choose more than 1 proxy  · Do not resuscitate (DNR) order:  A DNR order is used in case your heart stops beating or you stop breathing  It is a request not to have certain forms of treatment, such as CPR  A DNR order may be included in other types of advance directives  · Medical directive: This covers the care that you want if you are in a coma, near death, or unable to make decisions for yourself  You can list the treatments you want for each condition  Treatment may include pain medicine, surgery, blood transfusions, dialysis, IV or tube feedings, and a ventilator (breathing machine)  · Values history: This document has questions about your views, beliefs, and how you feel and think about life  This information can help others choose the care that you would choose  Why are advance directives important? An advance directive helps you control your care  Although spoken wishes may be used, it is better to have your wishes written down  Spoken wishes can be misunderstood, or not followed  Treatments may be given even if you do not want them  An advance directive may make it easier for your family to make difficult choices about your care  Weight Management   Why it is important to manage your weight:  Being overweight increases your risk of health conditions such as heart disease, high blood pressure, type 2 diabetes, and certain types of cancer  It can also increase your risk for osteoarthritis, sleep apnea, and other respiratory problems  Aim for a slow, steady weight loss  Even a small amount of weight loss can lower your risk of health problems  How to lose weight safely:  A safe and healthy way to lose weight is to eat fewer calories and get regular exercise   You can lose up about 1 pound a week by decreasing the number of calories you eat by 500 calories each day  Healthy meal plan for weight management:  A healthy meal plan includes a variety of foods, contains fewer calories, and helps you stay healthy  A healthy meal plan includes the following:  · Eat whole-grain foods more often  A healthy meal plan should contain fiber  Fiber is the part of grains, fruits, and vegetables that is not broken down by your body  Whole-grain foods are healthy and provide extra fiber in your diet  Some examples of whole-grain foods are whole-wheat breads and pastas, oatmeal, brown rice, and bulgur  · Eat a variety of vegetables every day  Include dark, leafy greens such as spinach, kale, bhavna greens, and mustard greens  Eat yellow and orange vegetables such as carrots, sweet potatoes, and winter squash  · Eat a variety of fruits every day  Choose fresh or canned fruit (canned in its own juice or light syrup) instead of juice  Fruit juice has very little or no fiber  · Eat low-fat dairy foods  Drink fat-free (skim) milk or 1% milk  Eat fat-free yogurt and low-fat cottage cheese  Try low-fat cheeses such as mozzarella and other reduced-fat cheeses  · Choose meat and other protein foods that are low in fat  Choose beans or other legumes such as split peas or lentils  Choose fish, skinless poultry (chicken or turkey), or lean cuts of red meat (beef or pork)  Before you cook meat or poultry, cut off any visible fat  · Use less fat and oil  Try baking foods instead of frying them  Add less fat, such as margarine, sour cream, regular salad dressing and mayonnaise to foods  Eat fewer high-fat foods  Some examples of high-fat foods include french fries, doughnuts, ice cream, and cakes  · Eat fewer sweets  Limit foods and drinks that are high in sugar  This includes candy, cookies, regular soda, and sweetened drinks    Exercise:  Exercise at least 30 minutes per day on most days of the week  Some examples of exercise include walking, biking, dancing, and swimming  You can also fit in more physical activity by taking the stairs instead of the elevator or parking farther away from stores  Ask your healthcare provider about the best exercise plan for you  Alcohol Use and Your Health    Drinking too much can harm your health  Excessive alcohol use leads to about 88,000 death in the [place] each year, and shortens the life of those who diet by almost 30 years  Further, excessive drinking cost the economy $249 billion in 2010  Most excessive drinkers are not alcohol dependent  Excessive alcohol use has immediate effects that increase the risk of many harmful health conditions  These are most often the result of binge drinking  Over time, excessive alcohol use can lead to the development of chronic diseases and other series health problems  What is considered a "drink"? Excessive alcohol use includes:  · Binge Drinking: For women, 4 or more drinks consumed on one occasion  For men, 5 or more drinks consumed on one occasion  · Heavy Drinking: For women, 8 or more drinks per week  For men, 15 or more drinks per week  · Any alcohol used by pregnant women  · Any alcohol used by those under the age of 21 years    If you choose to drink, do so in moderation:  · Do not drink at all if you are under the age of 24, or if you are or may be pregnant, or have health problems that could be made worse by drinking    · For women, up to 1 drink per day  · For men, up to 2 drinks a day    No one should begin drinking or drink more frequently based on potential health benefits    Short-Term Health Risks:  · Injuries: motor vehicle crashes, falls, drownings, burns  · Violence: homicide, suicide, sexual assault, intimate partner violence  · Alcohol poisoning  · Reproductive health: risky sexual behaviors, unintended prengnacy, sexually transmitted diseases, miscarriage, stillbirth, fetal alcohol syndrome    Long-Term Health Risks:  · Chronic diseases: high blood pressure, heart disease, stroke, liver disease, digestive problems  · Cancers: breast, mouth and throat, liver, colon  · Learning and memory problems: dementia, poor school performance  · Mental health: depression, anxiety, insomnia  · Social problems: lost productivity, family problems, unemployment  · Alcohol dependence    For support and more information:  · Substance Abuse and [facility] , [address] [place]  Web Address: https://[employer]/    · Alcoholics Anonymous        Web Address: http://www beasley info/    https://www cdc gov/alcohol/fact-sheets/alcohol-use htm     © [address] [place] 2018 Information is for End User's use only and may not be sold, redistributed or otherwise used for commercial purposes   All illustrations and images included in CareNotes® are the copyrighted property of A D A M [employer]  or [address] [place], [place] [address]pe

## 2022-10-28 NOTE — TELEPHONE ENCOUNTER
Upon review of the In Basket request we were able to locate, review, and update the patient chart as requested for Mammogram     Any additional questions or concerns should be emailed to the Practice Liaisons via the appropriate education email address, please do not reply via In Basket      Thank you  Good Levi

## 2022-10-28 NOTE — TELEPHONE ENCOUNTER
----- Message from Hansa Perez sent at 10/28/2022  9:45 AM EDT -----  Regarding: care gap request  10/28/22 9:45 AM    Hello, our patient attached above has had Mammogram completed/performed  Please assist in updating the patient chart by pulling the Care Everywhere (CE) document  The date of service is 10/24/22       Thank you,  Hansa Perez  [facility] [address]

## 2022-10-31 NOTE — ASSESSMENT & PLAN NOTE
Stable  Continue propranolol 120 mg daily and Fioricet or Imitrex as needed  She was encouraged to use Imitrex 1st line

## 2022-10-31 NOTE — ASSESSMENT & PLAN NOTE
BMI Counseling: Body mass index is 25 24 kg/m²  The BMI is above normal  Nutrition recommendations include 3-5 servings of fruits/vegetables daily  Exercise recommendations include exercising 3-5 times per week

## 2022-10-31 NOTE — ASSESSMENT & PLAN NOTE
Well controlled on omeprazole 40 mg twice daily  We discussed trying to wean to a lower dose if possible

## 2022-11-07 DIAGNOSIS — I10 ESSENTIAL HYPERTENSION: ICD-10-CM

## 2022-11-07 RX ORDER — PROPRANOLOL HYDROCHLORIDE 120 MG/1
CAPSULE, EXTENDED RELEASE ORAL
Qty: 90 CAPSULE | Refills: 0 | Status: SHIPPED | OUTPATIENT
Start: 2022-11-07

## 2022-11-10 DIAGNOSIS — Z12.39 ENCOUNTER FOR BREAST CANCER SCREENING USING NON-MAMMOGRAM MODALITY: Primary | ICD-10-CM

## 2022-11-10 DIAGNOSIS — R92.2 DENSE BREAST TISSUE: ICD-10-CM

## 2022-11-17 ENCOUNTER — IMMUNIZATIONS (OUTPATIENT)
Dept: FAMILY MEDICINE CLINIC | Facility: CLINIC | Age: 72
End: 2022-11-17

## 2022-11-17 DIAGNOSIS — Z23 COVID-19 VACCINE ADMINISTERED: Primary | ICD-10-CM

## 2022-11-22 DIAGNOSIS — F41.9 ANXIETY: ICD-10-CM

## 2022-11-22 RX ORDER — ESCITALOPRAM OXALATE 20 MG/1
TABLET ORAL
Qty: 90 TABLET | Refills: 1 | Status: SHIPPED | OUTPATIENT
Start: 2022-11-22

## 2022-12-15 LAB
ALBUMIN SERPL-MCNC: 3.8 G/DL (ref 3.6–5.1)
ALBUMIN/GLOB SERPL: 1.2 (CALC) (ref 1–2.5)
ALP SERPL-CCNC: 69 U/L (ref 37–153)
ALT SERPL-CCNC: 16 U/L (ref 6–29)
AST SERPL-CCNC: 19 U/L (ref 10–35)
BILIRUB SERPL-MCNC: 0.7 MG/DL (ref 0.2–1.2)
BUN SERPL-MCNC: 19 MG/DL (ref 7–25)
BUN/CREAT SERPL: NORMAL (CALC) (ref 6–22)
CALCIUM SERPL-MCNC: 9.4 MG/DL (ref 8.6–10.4)
CHLORIDE SERPL-SCNC: 105 MMOL/L (ref 98–110)
CHOLEST SERPL-MCNC: 214 MG/DL
CHOLEST/HDLC SERPL: 2.7 (CALC)
CO2 SERPL-SCNC: 31 MMOL/L (ref 20–32)
CREAT SERPL-MCNC: 0.92 MG/DL (ref 0.6–1)
GFR/BSA.PRED SERPLBLD CYS-BASED-ARV: 67 ML/MIN/1.73M2
GLOBULIN SER CALC-MCNC: 3.1 G/DL (CALC) (ref 1.9–3.7)
GLUCOSE SERPL-MCNC: 95 MG/DL (ref 65–99)
HBA1C MFR BLD: 5.4 % OF TOTAL HGB
HDLC SERPL-MCNC: 80 MG/DL
LDLC SERPL CALC-MCNC: 115 MG/DL (CALC)
NONHDLC SERPL-MCNC: 134 MG/DL (CALC)
POTASSIUM SERPL-SCNC: 4.2 MMOL/L (ref 3.5–5.3)
PROT SERPL-MCNC: 6.9 G/DL (ref 6.1–8.1)
SODIUM SERPL-SCNC: 140 MMOL/L (ref 135–146)
TRIGL SERPL-MCNC: 86 MG/DL

## 2022-12-17 DIAGNOSIS — R10.10 UPPER ABDOMINAL PAIN: ICD-10-CM

## 2022-12-17 RX ORDER — OMEPRAZOLE 40 MG/1
CAPSULE, DELAYED RELEASE ORAL
Qty: 180 CAPSULE | Refills: 1 | Status: SHIPPED | OUTPATIENT
Start: 2022-12-17

## 2022-12-26 DIAGNOSIS — M54.42 CHRONIC MIDLINE LOW BACK PAIN WITH LEFT-SIDED SCIATICA: ICD-10-CM

## 2022-12-26 DIAGNOSIS — G89.29 CHRONIC MIDLINE LOW BACK PAIN WITH LEFT-SIDED SCIATICA: ICD-10-CM

## 2022-12-26 RX ORDER — GABAPENTIN 300 MG/1
CAPSULE ORAL
Qty: 90 CAPSULE | Refills: 1 | Status: SHIPPED | OUTPATIENT
Start: 2022-12-26

## 2023-01-28 DIAGNOSIS — E78.5 HYPERLIPIDEMIA, UNSPECIFIED HYPERLIPIDEMIA TYPE: ICD-10-CM

## 2023-01-28 RX ORDER — ATORVASTATIN CALCIUM 80 MG/1
TABLET, FILM COATED ORAL
Qty: 90 TABLET | Refills: 1 | Status: SHIPPED | OUTPATIENT
Start: 2023-01-28

## 2023-02-08 DIAGNOSIS — I10 ESSENTIAL HYPERTENSION: ICD-10-CM

## 2023-02-08 RX ORDER — PROPRANOLOL HYDROCHLORIDE 120 MG/1
CAPSULE, EXTENDED RELEASE ORAL
Qty: 90 CAPSULE | Refills: 0 | Status: SHIPPED | OUTPATIENT
Start: 2023-02-08

## 2023-05-18 DIAGNOSIS — F41.9 ANXIETY: ICD-10-CM

## 2023-05-18 RX ORDER — ESCITALOPRAM OXALATE 20 MG/1
TABLET ORAL
Qty: 90 TABLET | Refills: 1 | Status: SHIPPED | OUTPATIENT
Start: 2023-05-18

## 2023-06-17 DIAGNOSIS — R10.10 UPPER ABDOMINAL PAIN: ICD-10-CM

## 2023-06-17 RX ORDER — OMEPRAZOLE 40 MG/1
CAPSULE, DELAYED RELEASE ORAL
Qty: 180 CAPSULE | Refills: 1 | Status: SHIPPED | OUTPATIENT
Start: 2023-06-17

## 2023-06-21 ENCOUNTER — TELEPHONE (OUTPATIENT)
Dept: FAMILY MEDICINE CLINIC | Facility: CLINIC | Age: 73
End: 2023-06-21

## 2023-06-26 NOTE — TELEPHONE ENCOUNTER
Patient would like a referral to [facility] as [facility] cannot get her in until September  Can you please place/print order? Staff: once completed, patient will need to call and provide us with fax number for order   Thank you
Pt called because she tried scheduling US for breast as you advised her after finding dense tissue in mammogram last year but the provider she contacted told her she cannot get it she is only allowed a mammogram once a year  Called Pt to clarify but she did not answer   L/M
Pt returned called back  Advised her to schedule US with [facility] and provided phone number 
oral

## 2023-06-30 DIAGNOSIS — M54.42 CHRONIC MIDLINE LOW BACK PAIN WITH LEFT-SIDED SCIATICA: ICD-10-CM

## 2023-06-30 DIAGNOSIS — G89.29 CHRONIC MIDLINE LOW BACK PAIN WITH LEFT-SIDED SCIATICA: ICD-10-CM

## 2023-06-30 RX ORDER — GABAPENTIN 300 MG/1
300 CAPSULE ORAL
Qty: 90 CAPSULE | Refills: 0 | Status: SHIPPED | OUTPATIENT
Start: 2023-06-30

## 2023-07-01 DIAGNOSIS — G43.701 CHRONIC MIGRAINE WITHOUT AURA WITH STATUS MIGRAINOSUS, NOT INTRACTABLE: ICD-10-CM

## 2023-07-01 RX ORDER — SUMATRIPTAN 25 MG/1
25 TABLET, FILM COATED ORAL ONCE AS NEEDED
Qty: 9 TABLET | Refills: 2 | Status: SHIPPED | OUTPATIENT
Start: 2023-07-01

## 2023-07-26 DIAGNOSIS — E78.5 HYPERLIPIDEMIA, UNSPECIFIED HYPERLIPIDEMIA TYPE: ICD-10-CM

## 2023-07-26 RX ORDER — ATORVASTATIN CALCIUM 80 MG/1
TABLET, FILM COATED ORAL
Qty: 90 TABLET | Refills: 1 | Status: SHIPPED | OUTPATIENT
Start: 2023-07-26

## 2023-08-22 DIAGNOSIS — G43.909 MIGRAINE WITHOUT STATUS MIGRAINOSUS, NOT INTRACTABLE, UNSPECIFIED MIGRAINE TYPE: ICD-10-CM

## 2023-08-22 RX ORDER — BUTALBITAL, ACETAMINOPHEN AND CAFFEINE 50; 325; 40 MG/1; MG/1; MG/1
1 TABLET ORAL EVERY 4 HOURS PRN
Qty: 20 TABLET | Refills: 0 | Status: SHIPPED | OUTPATIENT
Start: 2023-08-22

## 2023-09-14 ENCOUNTER — HOSPITAL ENCOUNTER (OUTPATIENT)
Dept: ULTRASOUND IMAGING | Facility: CLINIC | Age: 73
Discharge: HOME | End: 2023-09-14
Payer: COMMERCIAL

## 2023-09-14 VITALS — HEIGHT: 68 IN | WEIGHT: 166.01 LBS | BODY MASS INDEX: 25.16 KG/M2

## 2023-09-14 DIAGNOSIS — R92.2 DENSE BREAST TISSUE: ICD-10-CM

## 2023-09-14 DIAGNOSIS — Z12.39 ENCOUNTER FOR BREAST CANCER SCREENING USING NON-MAMMOGRAM MODALITY: ICD-10-CM

## 2023-09-14 PROCEDURE — 76641 ULTRASOUND BREAST COMPLETE: CPT

## 2023-09-22 DIAGNOSIS — G89.29 CHRONIC MIDLINE LOW BACK PAIN WITH LEFT-SIDED SCIATICA: ICD-10-CM

## 2023-09-22 DIAGNOSIS — M54.42 CHRONIC MIDLINE LOW BACK PAIN WITH LEFT-SIDED SCIATICA: ICD-10-CM

## 2023-09-22 RX ORDER — GABAPENTIN 300 MG/1
300 CAPSULE ORAL
Qty: 90 CAPSULE | Refills: 0 | Status: SHIPPED | OUTPATIENT
Start: 2023-09-22

## 2023-10-23 DIAGNOSIS — G43.701 CHRONIC MIGRAINE WITHOUT AURA WITH STATUS MIGRAINOSUS, NOT INTRACTABLE: ICD-10-CM

## 2023-10-23 RX ORDER — SUMATRIPTAN 25 MG/1
25 TABLET, FILM COATED ORAL ONCE AS NEEDED
Qty: 9 TABLET | Refills: 0 | Status: SHIPPED | OUTPATIENT
Start: 2023-10-23

## 2023-10-30 ENCOUNTER — OFFICE VISIT (OUTPATIENT)
Dept: FAMILY MEDICINE CLINIC | Facility: CLINIC | Age: 73
End: 2023-10-30
Payer: COMMERCIAL

## 2023-10-30 VITALS
BODY MASS INDEX: 25.34 KG/M2 | DIASTOLIC BLOOD PRESSURE: 72 MMHG | SYSTOLIC BLOOD PRESSURE: 120 MMHG | HEART RATE: 57 BPM | OXYGEN SATURATION: 97 % | HEIGHT: 68 IN | WEIGHT: 167.2 LBS

## 2023-10-30 DIAGNOSIS — Z78.0 ASYMPTOMATIC POSTMENOPAUSAL STATE: ICD-10-CM

## 2023-10-30 DIAGNOSIS — E78.5 HYPERLIPIDEMIA, UNSPECIFIED HYPERLIPIDEMIA TYPE: ICD-10-CM

## 2023-10-30 DIAGNOSIS — E66.3 OVERWEIGHT (BMI 25.0-29.9): ICD-10-CM

## 2023-10-30 DIAGNOSIS — M54.16 LUMBAR RADICULOPATHY: ICD-10-CM

## 2023-10-30 DIAGNOSIS — G43.909 MIGRAINE WITHOUT STATUS MIGRAINOSUS, NOT INTRACTABLE, UNSPECIFIED MIGRAINE TYPE: ICD-10-CM

## 2023-10-30 DIAGNOSIS — K29.70 GASTRITIS, PRESENCE OF BLEEDING UNSPECIFIED, UNSPECIFIED CHRONICITY, UNSPECIFIED GASTRITIS TYPE: ICD-10-CM

## 2023-10-30 DIAGNOSIS — F41.9 ANXIETY: ICD-10-CM

## 2023-10-30 DIAGNOSIS — Z00.00 MEDICARE ANNUAL WELLNESS VISIT, SUBSEQUENT: Primary | ICD-10-CM

## 2023-10-30 DIAGNOSIS — H91.93 DECREASED HEARING OF BOTH EARS: ICD-10-CM

## 2023-10-30 DIAGNOSIS — Z23 ENCOUNTER FOR IMMUNIZATION: ICD-10-CM

## 2023-10-30 DIAGNOSIS — I10 ESSENTIAL HYPERTENSION: ICD-10-CM

## 2023-10-30 PROCEDURE — 1125F AMNT PAIN NOTED PAIN PRSNT: CPT | Performed by: PHYSICIAN ASSISTANT

## 2023-10-30 PROCEDURE — 3725F SCREEN DEPRESSION PERFORMED: CPT | Performed by: PHYSICIAN ASSISTANT

## 2023-10-30 PROCEDURE — 1159F MED LIST DOCD IN RCRD: CPT | Performed by: PHYSICIAN ASSISTANT

## 2023-10-30 PROCEDURE — 90662 IIV NO PRSV INCREASED AG IM: CPT

## 2023-10-30 PROCEDURE — 3288F FALL RISK ASSESSMENT DOCD: CPT | Performed by: PHYSICIAN ASSISTANT

## 2023-10-30 PROCEDURE — G0439 PPPS, SUBSEQ VISIT: HCPCS | Performed by: PHYSICIAN ASSISTANT

## 2023-10-30 PROCEDURE — 99214 OFFICE O/P EST MOD 30 MIN: CPT | Performed by: PHYSICIAN ASSISTANT

## 2023-10-30 PROCEDURE — G0008 ADMIN INFLUENZA VIRUS VAC: HCPCS

## 2023-10-30 PROCEDURE — 1170F FXNL STATUS ASSESSED: CPT | Performed by: PHYSICIAN ASSISTANT

## 2023-10-30 PROCEDURE — 1160F RVW MEDS BY RX/DR IN RCRD: CPT | Performed by: PHYSICIAN ASSISTANT

## 2023-10-30 PROCEDURE — 1090F PRES/ABSN URINE INCON ASSESS: CPT | Performed by: PHYSICIAN ASSISTANT

## 2023-10-30 PROCEDURE — 1003F LEVEL OF ACTIVITY ASSESS: CPT | Performed by: PHYSICIAN ASSISTANT

## 2023-10-30 RX ORDER — BUTALBITAL, ACETAMINOPHEN AND CAFFEINE 50; 325; 40 MG/1; MG/1; MG/1
1 TABLET ORAL EVERY 4 HOURS PRN
Qty: 20 TABLET | Refills: 0 | Status: SHIPPED | OUTPATIENT
Start: 2023-10-30

## 2023-10-30 RX ORDER — PROPRANOLOL HYDROCHLORIDE 120 MG/1
120 CAPSULE, EXTENDED RELEASE ORAL DAILY
Qty: 90 CAPSULE | Refills: 1 | Status: SHIPPED | OUTPATIENT
Start: 2023-10-30

## 2023-10-30 NOTE — ASSESSMENT & PLAN NOTE
Controlled. Continue Lexapro 20 mg daily. She expressed interest in potentially decreasing dose in future. She can try alternating 20 mg with 10 mg every other day for a few weeks and if still okay, could wean down to 10 mg daily (1/2 tab of current 20 mg). If anxiety flares, return to previous dose.

## 2023-10-30 NOTE — PATIENT INSTRUCTIONS
Medicare Preventive Visit Patient Instructions  Thank you for completing your Welcome to Medicare Visit or Medicare Annual Wellness Visit today. Your next wellness visit will be due in one year (10/30/2024). The screening/preventive services that you may require over the next 5-10 years are detailed below. Some tests may not apply to you based off risk factors and/or age. Screening tests ordered at today's visit but not completed yet may show as past due. Also, please note that scanned in results may not display below. Preventive Screenings:  Service Recommendations Previous Testing/Comments   Colorectal Cancer Screening  * Colonoscopy    * Fecal Occult Blood Test (FOBT)/Fecal Immunochemical Test (FIT)  * Fecal DNA/Cologuard Test  * Flexible Sigmoidoscopy Age: 43-73 years old   Colonoscopy: every 10 years (may be performed more frequently if at higher risk)  OR  FOBT/FIT: every 1 year  OR  Cologuard: every 3 years  OR  Sigmoidoscopy: every 5 years  Screening may be recommended earlier than age 39 if at higher risk for colorectal cancer. Also, an individualized decision between you and your healthcare provider will decide whether screening between the ages of 77-80 would be appropriate. Colonoscopy: 03/16/2022  FOBT/FIT: Not on file  Cologuard: Not on file  Sigmoidoscopy: Not on file    Screening Current     Breast Cancer Screening Age: 38 years old  Frequency: every 1-2 years  Not required if history of left and right mastectomy Mammogram: 10/24/2022    Screening Current   Cervical Cancer Screening Between the ages of 21-29, pap smear recommended once every 3 years. Between the ages of 32-69, can perform pap smear with HPV co-testing every 5 years.    Recommendations may differ for women with a history of total hysterectomy, cervical cancer, or abnormal pap smears in past. Pap Smear: Not on file    Screening Not Indicated   Hepatitis C Screening Once for adults born between 1945 and 1965  More frequently in patients at high risk for Hepatitis C Hep C Antibody: 01/15/2018    Screening Current   Diabetes Screening 1-2 times per year if you're at risk for diabetes or have pre-diabetes Fasting glucose: 92 mg/dL (2/3/2021)  A1C: 5.4 % of total Hgb (12/14/2022)  Screening Current   Cholesterol Screening Once every 5 years if you don't have a lipid disorder. May order more often based on risk factors. Lipid panel: 12/14/2022    Screening Not Indicated  History Lipid Disorder     Other Preventive Screenings Covered by Medicare:  Abdominal Aortic Aneurysm (AAA) Screening: covered once if your at risk. You're considered to be at risk if you have a family history of AAA. Lung Cancer Screening: covers low dose CT scan once per year if you meet all of the following conditions: (1) Age 48-67; (2) No signs or symptoms of lung cancer; (3) Current smoker or have quit smoking within the last 15 years; (4) You have a tobacco smoking history of at least 20 pack years (packs per day multiplied by number of years you smoked); (5) You get a written order from a healthcare provider. Glaucoma Screening: covered annually if you're considered high risk: (1) You have diabetes OR (2) Family history of glaucoma OR (3)  aged 48 and older OR (3)  American aged 72 and older  Osteoporosis Screening: covered every 2 years if you meet one of the following conditions: (1) You're estrogen deficient and at risk for osteoporosis based off medical history and other findings; (2) Have a vertebral abnormality; (3) On glucocorticoid therapy for more than 3 months; (4) Have primary hyperparathyroidism; (5) On osteoporosis medications and need to assess response to drug therapy. Last bone density test (DXA Scan): Not on file. HIV Screening: covered annually if you're between the age of 14-79. Also covered annually if you are younger than 13 and older than 72 with risk factors for HIV infection.  For pregnant patients, it is covered up to 3 times per pregnancy. Immunizations:  Immunization Recommendations   Influenza Vaccine Annual influenza vaccination during flu season is recommended for all persons aged >= 6 months who do not have contraindications   Pneumococcal Vaccine   * Pneumococcal conjugate vaccine = PCV13 (Prevnar 13), PCV15 (Vaxneuvance), PCV20 (Prevnar 20)  * Pneumococcal polysaccharide vaccine = PPSV23 (Pneumovax) Adults 08-86 yo with certain risk factors or if 69+ yo  If never received any pneumonia vaccine: recommend Prevnar 20 (PCV20)  Give PCV20 if previously received 1 dose of PCV13 or PPSV23   Hepatitis B Vaccine 3 dose series if at intermediate or high risk (ex: diabetes, end stage renal disease, liver disease)   Respiratory syncytial virus (RSV) Vaccine - COVERED BY MEDICARE PART D  * RSVPreF3 (Arexvy) CDC recommends that adults 61years of age and older may receive a single dose of RSV vaccine using shared clinical decision-making (SCDM)   Tetanus (Td) Vaccine - COST NOT COVERED BY MEDICARE PART B Following completion of primary series, a booster dose should be given every 10 years to maintain immunity against tetanus. Td may also be given as tetanus wound prophylaxis. Tdap Vaccine - COST NOT COVERED BY MEDICARE PART B Recommended at least once for all adults. For pregnant patients, recommended with each pregnancy. Shingles Vaccine (Shingrix) - COST NOT COVERED BY MEDICARE PART B  2 shot series recommended in those 19 years and older who have or will have weakened immune systems or those 50 years and older     Health Maintenance Due:      Topic Date Due   • DXA SCAN  Never done   • Breast Cancer Screening: Mammogram  10/24/2023   • Colorectal Cancer Screening  03/16/2027   • Hepatitis C Screening  Completed     Immunizations Due:      Topic Date Due   • COVID-19 Vaccine (5 - Pfizer series) 03/17/2023   • Influenza Vaccine (1) 09/01/2023     Advance Directives   What are advance directives?   Advance directives are legal documents that state your wishes and plans for medical care. These plans are made ahead of time in case you lose your ability to make decisions for yourself. Advance directives can apply to any medical decision, such as the treatments you want, and if you want to donate organs. What are the types of advance directives? There are many types of advance directives, and each state has rules about how to use them. You may choose a combination of any of the following:  Living will: This is a written record of the treatment you want. You can also choose which treatments you do not want, which to limit, and which to stop at a certain time. This includes surgery, medicine, IV fluid, and tube feedings. Durable power of  for [facility]): This is a written record that states who you want to make healthcare choices for you when you are unable to make them for yourself. This person, called a proxy, is usually a family member or a friend. You may choose more than 1 proxy. Do not resuscitate (DNR) order:  A DNR order is used in case your heart stops beating or you stop breathing. It is a request not to have certain forms of treatment, such as CPR. A DNR order may be included in other types of advance directives. Medical directive: This covers the care that you want if you are in a coma, near death, or unable to make decisions for yourself. You can list the treatments you want for each condition. Treatment may include pain medicine, surgery, blood transfusions, dialysis, IV or tube feedings, and a ventilator (breathing machine). Values history: This document has questions about your views, beliefs, and how you feel and think about life. This information can help others choose the care that you would choose. Why are advance directives important? An advance directive helps you control your care. Although spoken wishes may be used, it is better to have your wishes written down.  Spoken wishes can be misunderstood, or not followed. Treatments may be given even if you do not want them. An advance directive may make it easier for your family to make difficult choices about your care. Weight Management   Why it is important to manage your weight:  Being overweight increases your risk of health conditions such as heart disease, high blood pressure, type 2 diabetes, and certain types of cancer. It can also increase your risk for osteoarthritis, sleep apnea, and other respiratory problems. Aim for a slow, steady weight loss. Even a small amount of weight loss can lower your risk of health problems. How to lose weight safely:  A safe and healthy way to lose weight is to eat fewer calories and get regular exercise. You can lose up about 1 pound a week by decreasing the number of calories you eat by 500 calories each day. Healthy meal plan for weight management:  A healthy meal plan includes a variety of foods, contains fewer calories, and helps you stay healthy. A healthy meal plan includes the following:  Eat whole-grain foods more often. A healthy meal plan should contain fiber. Fiber is the part of grains, fruits, and vegetables that is not broken down by your body. Whole-grain foods are healthy and provide extra fiber in your diet. Some examples of whole-grain foods are whole-wheat breads and pastas, oatmeal, brown rice, and bulgur. Eat a variety of vegetables every day. Include dark, leafy greens such as spinach, kale, bhavna greens, and mustard greens. Eat yellow and orange vegetables such as carrots, sweet potatoes, and winter squash. Eat a variety of fruits every day. Choose fresh or canned fruit (canned in its own juice or light syrup) instead of juice. Fruit juice has very little or no fiber. Eat low-fat dairy foods. Drink fat-free (skim) milk or 1% milk. Eat fat-free yogurt and low-fat cottage cheese. Try low-fat cheeses such as mozzarella and other reduced-fat cheeses.   Choose meat and other protein foods that are low in fat. Choose beans or other legumes such as split peas or lentils. Choose fish, skinless poultry (chicken or turkey), or lean cuts of red meat (beef or pork). Before you cook meat or poultry, cut off any visible fat. Use less fat and oil. Try baking foods instead of frying them. Add less fat, such as margarine, sour cream, regular salad dressing and mayonnaise to foods. Eat fewer high-fat foods. Some examples of high-fat foods include french fries, doughnuts, ice cream, and cakes. Eat fewer sweets. Limit foods and drinks that are high in sugar. This includes candy, cookies, regular soda, and sweetened drinks. Exercise:  Exercise at least 30 minutes per day on most days of the week. Some examples of exercise include walking, biking, dancing, and swimming. You can also fit in more physical activity by taking the stairs instead of the elevator or parking farther away from stores. Ask your healthcare provider about the best exercise plan for you. Alcohol Use and Your Health    Drinking too much can harm your health. Excessive alcohol use leads to about 88,000 death in [facility] each year, and shortens the life of those who diet by almost 30 years. Further, excessive drinking cost the economy $249 billion in 2010. Most excessive drinkers are not alcohol dependent. Excessive alcohol use has immediate effects that increase the risk of many harmful health conditions. These are most often the result of binge drinking. Over time, excessive alcohol use can lead to the development of chronic diseases and other series health problems. What is considered a "drink"? Excessive alcohol use includes:  Binge Drinking: For women, 4 or more drinks consumed on one occasion. For men, 5 or more drinks consumed on one occasion. Heavy Drinking: For women, 8 or more drinks per week.  For men, 15 or more drinks per week  Any alcohol used by pregnant women  Any alcohol used by those under the age of 24 years    If you choose to drink, do so in moderation:  Do not drink at all if you are under the age of 24, or if you are or may be pregnant, or have health problems that could be made worse by drinking. For women, up to 1 drink per day  For men, up to 2 drinks a day    No one should begin drinking or drink more frequently based on potential health benefits    Short-Term Health Risks:  Injuries: motor vehicle crashes, falls, drownings, burns  Violence: homicide, suicide, sexual assault, intimate partner violence  Alcohol poisoning  Reproductive health: risky sexual behaviors, unintended prengnacy, sexually transmitted diseases, miscarriage, stillbirth, fetal alcohol syndrome    Long-Term Health Risks:  Chronic diseases: high blood pressure, heart disease, stroke, liver disease, digestive problems  Cancers: breast, mouth and throat, liver, colon  Learning and memory problems: dementia, poor school performance  Mental health: depression, anxiety, insomnia  Social problems: lost productivity, family problems, unemployment  Alcohol dependence    For support and more information:  Substance Abuse and [address]  Web Address: https://G5/    Alcoholics Anonymous        Web Address: http://www.PixelPlay.Blued/    https://www.cdc.gov/alcohol/fact-sheets/alcohol-use.htm     © Copyright [address] 2018 Information is for End User's use only and may not be sold, redistributed or otherwise used for commercial purposes. All illustrations and images included in CareNotes® are the copyrighted property of [employer] or [employer] [facility] Preventive Visit Patient Instructions  Thank you for completing your Welcome to Medicare Visit or Medicare Annual Wellness Visit today. Your next wellness visit will be due in one year (10/30/2024).   The screening/preventive services that you may require over the next 5-10 years are detailed below. Some tests may not apply to you based off risk factors and/or age. Screening tests ordered at today's visit but not completed yet may show as past due. Also, please note that scanned in results may not display below. Preventive Screenings:  Service Recommendations Previous Testing/Comments   Colorectal Cancer Screening  * Colonoscopy    * Fecal Occult Blood Test (FOBT)/Fecal Immunochemical Test (FIT)  * Fecal DNA/Cologuard Test  * Flexible Sigmoidoscopy Age: 43-73 years old   Colonoscopy: every 10 years (may be performed more frequently if at higher risk)  OR  FOBT/FIT: every 1 year  OR  Cologuard: every 3 years  OR  Sigmoidoscopy: every 5 years  Screening may be recommended earlier than age 39 if at higher risk for colorectal cancer. Also, an individualized decision between you and your healthcare provider will decide whether screening between the ages of 77-80 would be appropriate. Colonoscopy: 03/16/2022  FOBT/FIT: Not on file  Cologuard: Not on file  Sigmoidoscopy: Not on file    Screening Current     Breast Cancer Screening Age: 38 years old  Frequency: every 1-2 years  Not required if history of left and right mastectomy Mammogram: 10/24/2022    Screening Current   Cervical Cancer Screening Between the ages of 21-29, pap smear recommended once every 3 years. Between the ages of 32-69, can perform pap smear with HPV co-testing every 5 years.    Recommendations may differ for women with a history of total hysterectomy, cervical cancer, or abnormal pap smears in past. Pap Smear: Not on file    Screening Not Indicated   Hepatitis C Screening Once for adults born between 1945 and 1965  More frequently in patients at high risk for Hepatitis C Hep C Antibody: 01/15/2018    Screening Current   Diabetes Screening 1-2 times per year if you're at risk for diabetes or have pre-diabetes Fasting glucose: 92 mg/dL (2/3/2021)  A1C: 5.4 % of total Hgb (12/14/2022)  Screening Current   Cholesterol Screening Once every 5 years if you don't have a lipid disorder. May order more often based on risk factors. Lipid panel: 12/14/2022    Screening Not Indicated  History Lipid Disorder     Other Preventive Screenings Covered by Medicare:  Abdominal Aortic Aneurysm (AAA) Screening: covered once if your at risk. You're considered to be at risk if you have a family history of AAA. Lung Cancer Screening: covers low dose CT scan once per year if you meet all of the following conditions: (1) Age 48-67; (2) No signs or symptoms of lung cancer; (3) Current smoker or have quit smoking within the last 15 years; (4) You have a tobacco smoking history of at least 20 pack years (packs per day multiplied by number of years you smoked); (5) You get a written order from a healthcare provider. Glaucoma Screening: covered annually if you're considered high risk: (1) You have diabetes OR (2) Family history of glaucoma OR (3)  aged 48 and older OR (3)  American aged 72 and older  Osteoporosis Screening: covered every 2 years if you meet one of the following conditions: (1) You're estrogen deficient and at risk for osteoporosis based off medical history and other findings; (2) Have a vertebral abnormality; (3) On glucocorticoid therapy for more than 3 months; (4) Have primary hyperparathyroidism; (5) On osteoporosis medications and need to assess response to drug therapy. Last bone density test (DXA Scan): Not on file. HIV Screening: covered annually if you're between the age of 14-79. Also covered annually if you are younger than 13 and older than 72 with risk factors for HIV infection. For pregnant patients, it is covered up to 3 times per pregnancy.     Immunizations:  Immunization Recommendations   Influenza Vaccine Annual influenza vaccination during flu season is recommended for all persons aged >= 6 months who do not have contraindications   Pneumococcal Vaccine   * Pneumococcal conjugate vaccine = PCV13 (Prevnar 15), PCV15 (Vaxneuvance), PCV20 (Prevnar 20)  * Pneumococcal polysaccharide vaccine = PPSV23 (Pneumovax) Adults 56-08 yo with certain risk factors or if 69+ yo  If never received any pneumonia vaccine: recommend Prevnar 20 (PCV20)  Give PCV20 if previously received 1 dose of PCV13 or PPSV23   Hepatitis B Vaccine 3 dose series if at intermediate or high risk (ex: diabetes, end stage renal disease, liver disease)   Respiratory syncytial virus (RSV) Vaccine - COVERED BY MEDICARE PART D  * RSVPreF3 (Arexvy) CDC recommends that adults 61years of age and older may receive a single dose of RSV vaccine using shared clinical decision-making (SCDM)   Tetanus (Td) Vaccine - COST NOT COVERED BY MEDICARE PART B Following completion of primary series, a booster dose should be given every 10 years to maintain immunity against tetanus. Td may also be given as tetanus wound prophylaxis. Tdap Vaccine - COST NOT COVERED BY MEDICARE PART B Recommended at least once for all adults. For pregnant patients, recommended with each pregnancy. Shingles Vaccine (Shingrix) - COST NOT COVERED BY MEDICARE PART B  2 shot series recommended in those 19 years and older who have or will have weakened immune systems or those 50 years and older     Health Maintenance Due:      Topic Date Due   • DXA SCAN  Never done   • Breast Cancer Screening: Mammogram  10/24/2023   • Colorectal Cancer Screening  03/16/2027   • Hepatitis C Screening  Completed     Immunizations Due:      Topic Date Due   • COVID-19 Vaccine (5 - Pfizer series) 03/17/2023   • Influenza Vaccine (1) 09/01/2023     Advance Directives   What are advance directives? Advance directives are legal documents that state your wishes and plans for medical care. These plans are made ahead of time in case you lose your ability to make decisions for yourself. Advance directives can apply to any medical decision, such as the treatments you want, and if you want to donate organs.    What are the types of advance directives? There are many types of advance directives, and each state has rules about how to use them. You may choose a combination of any of the following:  Living will: This is a written record of the treatment you want. You can also choose which treatments you do not want, which to limit, and which to stop at a certain time. This includes surgery, medicine, IV fluid, and tube feedings. Durable power of  for [facility]): This is a written record that states who you want to make healthcare choices for you when you are unable to make them for yourself. This person, called a proxy, is usually a family member or a friend. You may choose more than 1 proxy. Do not resuscitate (DNR) order:  A DNR order is used in case your heart stops beating or you stop breathing. It is a request not to have certain forms of treatment, such as CPR. A DNR order may be included in other types of advance directives. Medical directive: This covers the care that you want if you are in a coma, near death, or unable to make decisions for yourself. You can list the treatments you want for each condition. Treatment may include pain medicine, surgery, blood transfusions, dialysis, IV or tube feedings, and a ventilator (breathing machine). Values history: This document has questions about your views, beliefs, and how you feel and think about life. This information can help others choose the care that you would choose. Why are advance directives important? An advance directive helps you control your care. Although spoken wishes may be used, it is better to have your wishes written down. Spoken wishes can be misunderstood, or not followed. Treatments may be given even if you do not want them. An advance directive may make it easier for your family to make difficult choices about your care.    Weight Management   Why it is important to manage your weight:  Being overweight increases your risk of health conditions such as heart disease, high blood pressure, type 2 diabetes, and certain types of cancer. It can also increase your risk for osteoarthritis, sleep apnea, and other respiratory problems. Aim for a slow, steady weight loss. Even a small amount of weight loss can lower your risk of health problems. How to lose weight safely:  A safe and healthy way to lose weight is to eat fewer calories and get regular exercise. You can lose up about 1 pound a week by decreasing the number of calories you eat by 500 calories each day. Healthy meal plan for weight management:  A healthy meal plan includes a variety of foods, contains fewer calories, and helps you stay healthy. A healthy meal plan includes the following:  Eat whole-grain foods more often. A healthy meal plan should contain fiber. Fiber is the part of grains, fruits, and vegetables that is not broken down by your body. Whole-grain foods are healthy and provide extra fiber in your diet. Some examples of whole-grain foods are whole-wheat breads and pastas, oatmeal, brown rice, and bulgur. Eat a variety of vegetables every day. Include dark, leafy greens such as spinach, kale, bhavna greens, and mustard greens. Eat yellow and orange vegetables such as carrots, sweet potatoes, and winter squash. Eat a variety of fruits every day. Choose fresh or canned fruit (canned in its own juice or light syrup) instead of juice. Fruit juice has very little or no fiber. Eat low-fat dairy foods. Drink fat-free (skim) milk or 1% milk. Eat fat-free yogurt and low-fat cottage cheese. Try low-fat cheeses such as mozzarella and other reduced-fat cheeses. Choose meat and other protein foods that are low in fat. Choose beans or other legumes such as split peas or lentils. Choose fish, skinless poultry (chicken or turkey), or lean cuts of red meat (beef or pork). Before you cook meat or poultry, cut off any visible fat. Use less fat and oil.   Try baking foods instead of frying them. Add less fat, such as margarine, sour cream, regular salad dressing and mayonnaise to foods. Eat fewer high-fat foods. Some examples of high-fat foods include french fries, doughnuts, ice cream, and cakes. Eat fewer sweets. Limit foods and drinks that are high in sugar. This includes candy, cookies, regular soda, and sweetened drinks. Exercise:  Exercise at least 30 minutes per day on most days of the week. Some examples of exercise include walking, biking, dancing, and swimming. You can also fit in more physical activity by taking the stairs instead of the elevator or parking farther away from stores. Ask your healthcare provider about the best exercise plan for you. Alcohol Use and Your Health    Drinking too much can harm your health. Excessive alcohol use leads to about 88,000 death in [facility] each year, and shortens the life of those who diet by almost 30 years. Further, excessive drinking cost the economy $249 billion in 2010. Most excessive drinkers are not alcohol dependent. Excessive alcohol use has immediate effects that increase the risk of many harmful health conditions. These are most often the result of binge drinking. Over time, excessive alcohol use can lead to the development of chronic diseases and other series health problems. What is considered a "drink"? Excessive alcohol use includes:  Binge Drinking: For women, 4 or more drinks consumed on one occasion. For men, 5 or more drinks consumed on one occasion. Heavy Drinking: For women, 8 or more drinks per week. For men, 15 or more drinks per week  Any alcohol used by pregnant women  Any alcohol used by those under the age of 21 years    If you choose to drink, do so in moderation:  Do not drink at all if you are under the age of 24, or if you are or may be pregnant, or have health problems that could be made worse by drinking.   For women, up to 1 drink per day  For men, up to 2 drinks a day    No one should begin drinking or drink more frequently based on potential health benefits    Short-Term Health Risks:  Injuries: motor vehicle crashes, falls, drownings, burns  Violence: homicide, suicide, sexual assault, intimate partner violence  Alcohol poisoning  Reproductive health: risky sexual behaviors, unintended prengnacy, sexually transmitted diseases, miscarriage, stillbirth, fetal alcohol syndrome    Long-Term Health Risks:  Chronic diseases: high blood pressure, heart disease, stroke, liver disease, digestive problems  Cancers: breast, mouth and throat, liver, colon  Learning and memory problems: dementia, poor school performance  Mental health: depression, anxiety, insomnia  Social problems: lost productivity, family problems, unemployment  Alcohol dependence    For support and more information:  Substance Abuse and [address]  Web Address: https://[employer]/    Alcoholics Anonymous        Web Address: http://www.[employer].info/    https://www.cdc.gov/alcohol/fact-sheets/alcohol-use.htm     © Copyright [employer] 2018 Information is for End User's use only and may not be sold, redistributed or otherwise used for commercial purposes.  All illustrations and images included in CareNotes® are the copyrighted property of [employer] or [address] [place] [address]

## 2023-10-30 NOTE — PROGRESS NOTES
Assessment and Plan:     Problem List Items Addressed This Visit          Digestive    Gastritis     Controlled. Continue omeprazole 40 mg daily. Cardiovascular and Mediastinum    Migraine     Occasional. Continue propranolol 120 mg daily and PRN Fioricet or Imitrex. Relevant Medications    butalbital-acetaminophen-caffeine (FIORICET,ESGIC) -40 mg per tablet    propranolol (INDERAL LA) 120 mg 24 hr capsule       Nervous and Auditory    Lumbar radiculopathy     Continue gabapentin 300 mg at bedtime. Other    Hyperlipidemia     Currently on atorvastatin 80 mg daily. Recheck lipid panel prior to next visit. Relevant Orders    CBC and differential    Comprehensive metabolic panel    Hemoglobin A1C    Lipid Panel with Direct LDL reflex    TSH, 3rd generation with Free T4 reflex    Anxiety     Controlled. Continue Lexapro 20 mg daily. She expressed interest in potentially decreasing dose in future. She can try alternating 20 mg with 10 mg every other day for a few weeks and if still okay, could wean down to 10 mg daily (1/2 tab of current 20 mg). If anxiety flares, return to previous dose. Relevant Orders    CBC and differential    Comprehensive metabolic panel    Hemoglobin A1C    Lipid Panel with Direct LDL reflex    TSH, 3rd generation with Free T4 reflex    Overweight (BMI 25.0-29. 9)     BMI Counseling: Body mass index is 25.42 kg/m². The BMI is above normal. Nutrition recommendations include reducing intake of saturated fat and trans fat. Exercise recommendations include exercising 3-5 times per week.            Relevant Orders    CBC and differential    Comprehensive metabolic panel    Hemoglobin A1C    Lipid Panel with Direct LDL reflex    TSH, 3rd generation with Free T4 reflex     Other Visit Diagnoses       Medicare annual wellness visit, subsequent    -  Primary    Relevant Orders    CBC and differential    Comprehensive metabolic panel    Hemoglobin A1C Lipid Panel with Direct LDL reflex    TSH, 3rd generation with Free T4 reflex    Encounter for immunization        Relevant Orders    influenza vaccine, high-dose, PF 0.7 mL (FLUZONE HIGH-DOSE) (Completed)    Essential hypertension        Relevant Medications    propranolol (INDERAL LA) 120 mg 24 hr capsule    Other Relevant Orders    CBC and differential    Comprehensive metabolic panel    Hemoglobin A1C    Lipid Panel with Direct LDL reflex    TSH, 3rd generation with Free T4 reflex    Decreased hearing of both ears        Relevant Orders    Ambulatory Referral to Otolaryngology    Asymptomatic postmenopausal state        Relevant Orders    DXA bone density spine hip and pelvis            BMI Counseling: Body mass index is 25.42 kg/m². The BMI is above normal. Nutrition recommendations include encouraging healthy choices of fruits and vegetables. Exercise recommendations include moderate physical activity 150 minutes/week. Rationale for BMI follow-up plan is due to patient being overweight or obese. Depression Screening and Follow-up Plan: Patient was screened for depression during today's encounter. They screened negative with a PHQ-2 score of 0. Preventive health issues were discussed with patient, and age appropriate screening tests were ordered as noted in patient's After Visit Summary. Personalized health advice and appropriate referrals for health education or preventive services given if needed, as noted in patient's After Visit Summary.      History of Present Illness:     Patient presents for a Medicare Wellness Visit    Gastritis-on omeprazole 40 mg once daily     Migraines-on propranolol 120 mg daily and Fioricet or Imitrex as needed - notes that they occur randomly with weather and strong odors     Lumbar radiculopathy- controlled on gabapentin 300 mg at bedtime    Hyperlipidemia-on atorvastatin 80 mg daily-last LDL was 115 in 12/2022    Anxiety-on Lexapro 20 mg daily - controlled Patient Care Team:  Armani Torres PA-C as PCP - General (Family Medicine)  Dana Hobson DO     Review of Systems:     Review of Systems   Constitutional:  Negative for chills and fever. HENT:  Negative for congestion, rhinorrhea and sore throat. Eyes:  Negative for visual disturbance. Respiratory:  Negative for cough, shortness of breath and wheezing. Cardiovascular:  Negative for chest pain, palpitations and leg swelling. Gastrointestinal:  Negative for abdominal pain, blood in stool, constipation, diarrhea, nausea and vomiting. Endocrine: Negative for polydipsia and polyuria. Genitourinary:  Negative for dysuria and frequency. Musculoskeletal:  Negative for arthralgias and myalgias. Skin:  Negative for rash. Neurological:  Positive for headaches. Negative for dizziness and syncope. Hematological:  Does not bruise/bleed easily. Psychiatric/Behavioral:  Negative for dysphoric mood. The patient is not nervous/anxious. Problem List:     Patient Active Problem List   Diagnosis    Abnormal EKG    Benign neoplasm of colon    Breast tenderness in female    Neck pain    Hearing loss    Displacement of thoracic intervertebral disc without myelopathy    Hyperlipidemia    Irritable bowel syndrome    Levoscoliosis    Lightheadedness    Chronic low back pain    Memory difficulties    Migraine    Onychomycosis    Osteoarthritis of both hands    Palpitations    Skin lesions    Anxiety    Gastritis    Overweight (BMI 25.0-29. 9)    Lumbar radiculopathy    Spinal stenosis of lumbar region      Past Medical and Surgical History:     Past Medical History:   Diagnosis Date    SHAY (acute kidney injury) ([address]) 2/3/2021    Anxiety 2018    Arthritis 2008    Back pain     Depression     GERD (gastroesophageal reflux disease) 2020    Headache(784.0) 1978    Hyperlipidemia      Past Surgical History:   Procedure Laterality Date    APPENDECTOMY      COLONOSCOPY      HYSTERECTOMY        Family History: Family History   Problem Relation Age of Onset    Heart failure Mother     Arthritis Mother     Coronary artery disease Mother     Lung cancer Father     Cancer Father     Lupus Daughter     No Known Problems Maternal Grandmother     No Known Problems Maternal Grandfather     Cancer Paternal Grandfather         unknown type    Multiple sclerosis Brother     Bipolar disorder Son     Asthma Son     Breast cancer Maternal Aunt     No Known Problems Maternal Aunt     No Known Problems Paternal Aunt     No Known Problems Paternal Aunt     No Known Problems Paternal Aunt       Social History:     Social History     Socioeconomic History    Marital status: /Civil Union     Spouse name: None    Number of children: None    Years of education: None    Highest education level: None   Occupational History    None   Tobacco Use    Smoking status: Former     Packs/day: 1.00     Years: 20.00     Total pack years: 20.00     Types: Cigarettes     Quit date:      Years since quittin.8    Smokeless tobacco: Never   Vaping Use    Vaping Use: Never used   Substance and Sexual Activity    Alcohol use: Yes     Alcohol/week: 3.0 standard drinks of alcohol     Types: 3 Glasses of wine per week     Comment: 1/2 glass of wine at a time    Drug use: Never    Sexual activity: None   Other Topics Concern    None   Social History Narrative    None     Social Determinants of Health     Financial Resource Strain: Low Risk  (10/25/2023)    Overall Financial Resource Strain (CARDIA)     Difficulty of Paying Living Expenses: Not very hard   Food Insecurity: Not on file   Transportation Needs: No Transportation Needs (10/25/2023)    PRAPARE - Transportation     Lack of Transportation (Medical): No     Lack of Transportation (Non-Medical):  No   Physical Activity: Not on file   Stress: Not on file   Social Connections: Not on file   Intimate Partner Violence: Not on file   Housing Stability: Not on file      Medications and Allergies: Current Outpatient Medications   Medication Sig Dispense Refill    atorvastatin (LIPITOR) 80 mg tablet TAKE 1 TABLET BY MOUTH EVERY DAY 90 tablet 1    butalbital-acetaminophen-caffeine (FIORICET,ESGIC) -40 mg per tablet Take 1 tablet by mouth every 4 (four) hours as needed for headaches 20 tablet 0    escitalopram (LEXAPRO) 20 mg tablet TAKE 1 TABLET BY MOUTH EVERY DAY 90 tablet 1    gabapentin (NEURONTIN) 300 mg capsule Take 1 capsule (300 mg total) by mouth daily at bedtime 90 capsule 0    Multiple Vitamins-Minerals (MULTIVITAL PO) Take 1 tablet by mouth daily      omeprazole (PriLOSEC) 40 MG capsule TAKE 1 CAPSULE BY MOUTH 2 TIMES A DAY HALF AN HOUR PRIOR TO MEALS 180 capsule 1    propranolol (INDERAL LA) 120 mg 24 hr capsule Take 1 capsule (120 mg total) by mouth daily 90 capsule 1    SUMAtriptan (IMITREX) 25 mg tablet Take 1 tablet (25 mg total) by mouth once as needed for migraine for up to 1 dose 9 tablet 0     No current facility-administered medications for this visit.      No Known Allergies   Immunizations:     Immunization History   Administered Date(s) Administered    COVID-19 PFIZER VACCINE 0.3 ML IM 03/10/2021, 04/07/2021    COVID-19 Pfizer Vac BIVALENT Jose-sucrose 12 Yr+ IM 11/17/2022    COVID-19 Pfizer vac (Jose-sucrose, gray cap) 12 yr+ IM 06/30/2022    INFLUENZA 01/01/2006    Influenza Quadrivalent Preservative Free 3 years and older IM 10/17/2014, 10/01/2015    Influenza Split High Dose Preservative Free IM 01/09/2018    Influenza, high dose seasonal 0.7 mL 10/23/2019, 10/21/2020, 10/25/2021, 10/28/2022, 10/30/2023    Influenza, seasonal, injectable 10/18/2011, 12/06/2013    Pneumococcal Conjugate 13-Valent 01/09/2018    Pneumococcal Polysaccharide PPV23 03/25/2019    Tdap 01/31/2014, 01/29/2021      Health Maintenance:         Topic Date Due    DXA SCAN  Never done    Breast Cancer Screening: Mammogram  10/24/2023    Colorectal Cancer Screening  03/16/2027    Hepatitis C Screening Completed         Topic Date Due    COVID-19 Vaccine (5 - Pfizer series) 03/17/2023      Medicare Screening Tests and Risk Assessments:     Linda York is here for her Subsequent Wellness visit. Health Risk Assessment:   Patient rates overall health as fair. Patient feels that their physical health rating is same. Patient is satisfied with their life. Eyesight was rated as same. Hearing was rated as same. Patient feels that their emotional and mental health rating is same. Patients states they are never, rarely angry. Patient states they are sometimes unusually tired/fatigued. Pain experienced in the last 7 days has been some. Patient's pain rating has been 4/10. Patient states that she has experienced no weight loss or gain in last 6 months. Depression Screening:   PHQ-2 Score: 0      Fall Risk Screening: In the past year, patient has experienced: no history of falling in past year      Urinary Incontinence Screening:   Patient has not leaked urine accidently in the last six months. Home Safety:  Patient does not have trouble with stairs inside or outside of their home. Patient has working smoke alarms and has working carbon monoxide detector. Home safety hazards include: none. Nutrition:   Current diet is Limited junk food. Medications:   Patient is not currently taking any over-the-counter supplements. Patient is able to manage medications. Activities of Daily Living (ADLs)/Instrumental Activities of Daily Living (IADLs):   Walk and transfer into and out of bed and chair?: Yes  Dress and groom yourself?: Yes    Bathe or shower yourself?: Yes    Feed yourself?  Yes  Do your laundry/housekeeping?: Yes  Manage your money, pay your bills and track your expenses?: Yes  Make your own meals?: Yes    Do your own shopping?: Yes    Previous Hospitalizations:   Any hospitalizations or ED visits within the last 12 months?: No      Advance Care Planning:   Living will: No    Durable POA for healthcare: No    Advanced directive: No      Cognitive Screening:   Provider or family/friend/caregiver concerned regarding cognition?: No    PREVENTIVE SCREENINGS      Cardiovascular Screening:    General: Screening Not Indicated and History Lipid Disorder      Diabetes Screening:     General: Screening Current      Colorectal Cancer Screening:     General: Screening Current      Breast Cancer Screening:     General: Screening Current      Cervical Cancer Screening:    General: Screening Not Indicated      Osteoporosis Screening:      Due for: DXA Axial      Abdominal Aortic Aneurysm (AAA) Screening:        General: Screening Not Indicated      Lung Cancer Screening:     General: Screening Not Indicated      Hepatitis C Screening:    General: Screening Current    Screening, Brief Intervention, and Referral to Treatment (SBIRT)    Screening  Typical number of drinks in a day: 1  Typical number of drinks in a week: 3  Interpretation: Low risk drinking behavior. AUDIT-C Screenin) How often did you have a drink containing alcohol in the past year? 2 to 3 times a week  2) How many drinks did you have on a typical day when you were drinking in the past year? 1 to 2  3) How often did you have 6 or more drinks on one occasion in the past year? never    AUDIT-C Score: 3  Interpretation: Score 3-12 (female): POSITIVE screen for alcohol misuse    AUDIT Screenin) How often during the last year have you found that you were not able to stop drinking once you had started? 0 - never  5) How often during the last year have you failed to do what was normally expected from you because of drinking? 0 - never  6) How often during the last year have you needed a first drink in the morning to get yourself going after a heavy drinking session?  0 - never  7) How often during the last year have you had a feeling of guilt or remorse after drinking? 0 - never  8) How often during the last year have you been unable to remember what happened the night before because you had been drinking? 0 - never  9) Have you or someone else been injured as a result of your drinking? 0 - no  10) Has a relative or friend or a doctor or another health worker been concerned about your drinking or suggested you cut down? 0 - no    AUDIT Score: 3  Interpretation: Low risk alcohol consumption    Single Item Drug Screening:  How often have you used an illegal drug (including marijuana) or a prescription medication for non-medical reasons in the past year? never    Single Item Drug Screen Score: 0  Interpretation: Negative screen for possible drug use disorder    No results found. Physical Exam:     /72 (BP Location: Left arm, Patient Position: Sitting, Cuff Size: Large)   Pulse 57   Ht 5' 8" (1.727 m)   Wt 75.8 kg (167 lb 3.2 oz)   SpO2 97%   BMI 25.42 kg/m²     Physical Exam  Vitals reviewed. Constitutional:       General: She is not in acute distress. Appearance: Normal appearance. She is well-developed. She is not ill-appearing. HENT:      Head: Normocephalic and atraumatic. Right Ear: Tympanic membrane, ear canal and external ear normal. There is no impacted cerumen. Left Ear: Tympanic membrane, ear canal and external ear normal. There is no impacted cerumen. Nose: Nose normal. No congestion. Mouth/Throat:      Mouth: Mucous membranes are moist.      Pharynx: No oropharyngeal exudate or posterior oropharyngeal erythema. Eyes:      Conjunctiva/sclera: Conjunctivae normal.      Pupils: Pupils are equal, round, and reactive to light. Neck:      Thyroid: No thyromegaly. Vascular: No carotid bruit. Cardiovascular:      Rate and Rhythm: Normal rate and regular rhythm. Pulses: Normal pulses. Heart sounds: Normal heart sounds. No murmur heard. Pulmonary:      Effort: Pulmonary effort is normal.      Breath sounds: Normal breath sounds. No wheezing, rhonchi or rales.    Abdominal:      General: Bowel sounds are normal. There is no distension. Palpations: Abdomen is soft. There is no mass. Tenderness: There is no abdominal tenderness. There is no guarding. Musculoskeletal:      Cervical back: Normal range of motion and neck supple. Right lower leg: No edema. Left lower leg: No edema. Lymphadenopathy:      Cervical: No cervical adenopathy. Skin:     General: Skin is warm and dry. Findings: No rash. Neurological:      Mental Status: She is alert. Sensory: No sensory deficit. Motor: No weakness. Comments: 5/5 strength in UE and LE   Psychiatric:         Mood and Affect: Mood normal.         Behavior: Behavior normal.         Thought Content:  Thought content normal.         Judgment: Judgment normal.          Kasi Powell PA-C

## 2023-10-30 NOTE — ASSESSMENT & PLAN NOTE
BMI Counseling: Body mass index is 25.42 kg/m². The BMI is above normal. Nutrition recommendations include reducing intake of saturated fat and trans fat. Exercise recommendations include exercising 3-5 times per week.

## 2023-11-09 DIAGNOSIS — F41.9 ANXIETY: ICD-10-CM

## 2023-11-09 RX ORDER — ESCITALOPRAM OXALATE 20 MG/1
TABLET ORAL
Qty: 90 TABLET | Refills: 1 | Status: SHIPPED | OUTPATIENT
Start: 2023-11-09

## 2023-12-12 ENCOUNTER — APPOINTMENT (OUTPATIENT)
Dept: LAB | Facility: CLINIC | Age: 73
End: 2023-12-12
Payer: COMMERCIAL

## 2023-12-12 DIAGNOSIS — E66.3 OVERWEIGHT (BMI 25.0-29.9): ICD-10-CM

## 2023-12-12 DIAGNOSIS — E78.5 HYPERLIPIDEMIA, UNSPECIFIED HYPERLIPIDEMIA TYPE: ICD-10-CM

## 2023-12-12 DIAGNOSIS — Z00.00 MEDICARE ANNUAL WELLNESS VISIT, SUBSEQUENT: ICD-10-CM

## 2023-12-12 DIAGNOSIS — F41.9 ANXIETY: ICD-10-CM

## 2023-12-12 DIAGNOSIS — I10 ESSENTIAL HYPERTENSION: ICD-10-CM

## 2023-12-12 LAB
ALBUMIN SERPL BCP-MCNC: 4.2 G/DL (ref 3.5–5)
ALP SERPL-CCNC: 64 U/L (ref 34–104)
ALT SERPL W P-5'-P-CCNC: 16 U/L (ref 7–52)
ANION GAP SERPL CALCULATED.3IONS-SCNC: 6 MMOL/L
AST SERPL W P-5'-P-CCNC: 18 U/L (ref 13–39)
BASOPHILS # BLD AUTO: 0.07 THOUSANDS/ÂΜL (ref 0–0.1)
BASOPHILS NFR BLD AUTO: 1 % (ref 0–1)
BILIRUB SERPL-MCNC: 0.51 MG/DL (ref 0.2–1)
BUN SERPL-MCNC: 17 MG/DL (ref 5–25)
CALCIUM SERPL-MCNC: 9.4 MG/DL (ref 8.4–10.2)
CHLORIDE SERPL-SCNC: 104 MMOL/L (ref 96–108)
CHOLEST SERPL-MCNC: 230 MG/DL
CO2 SERPL-SCNC: 29 MMOL/L (ref 21–32)
CREAT SERPL-MCNC: 0.82 MG/DL (ref 0.6–1.3)
EOSINOPHIL # BLD AUTO: 0.14 THOUSAND/ÂΜL (ref 0–0.61)
EOSINOPHIL NFR BLD AUTO: 3 % (ref 0–6)
ERYTHROCYTE [DISTWIDTH] IN BLOOD BY AUTOMATED COUNT: 13.7 % (ref 11.6–15.1)
EST. AVERAGE GLUCOSE BLD GHB EST-MCNC: 123 MG/DL
GFR SERPL CREATININE-BSD FRML MDRD: 72 ML/MIN/1.73SQ M
GLUCOSE P FAST SERPL-MCNC: 106 MG/DL (ref 65–99)
HBA1C MFR BLD: 5.9 %
HCT VFR BLD AUTO: 42.5 % (ref 34.8–46.1)
HDLC SERPL-MCNC: 81 MG/DL
HGB BLD-MCNC: 13.8 G/DL (ref 11.5–15.4)
IMM GRANULOCYTES # BLD AUTO: 0.02 THOUSAND/UL (ref 0–0.2)
IMM GRANULOCYTES NFR BLD AUTO: 0 % (ref 0–2)
LDLC SERPL CALC-MCNC: 128 MG/DL (ref 0–100)
LYMPHOCYTES # BLD AUTO: 1.36 THOUSANDS/ÂΜL (ref 0.6–4.47)
LYMPHOCYTES NFR BLD AUTO: 25 % (ref 14–44)
MCH RBC QN AUTO: 30.7 PG (ref 26.8–34.3)
MCHC RBC AUTO-ENTMCNC: 32.5 G/DL (ref 31.4–37.4)
MCV RBC AUTO: 94 FL (ref 82–98)
MONOCYTES # BLD AUTO: 0.46 THOUSAND/ÂΜL (ref 0.17–1.22)
MONOCYTES NFR BLD AUTO: 8 % (ref 4–12)
NEUTROPHILS # BLD AUTO: 3.47 THOUSANDS/ÂΜL (ref 1.85–7.62)
NEUTS SEG NFR BLD AUTO: 63 % (ref 43–75)
NRBC BLD AUTO-RTO: 0 /100 WBCS
PLATELET # BLD AUTO: 200 THOUSANDS/UL (ref 149–390)
PMV BLD AUTO: 12.4 FL (ref 8.9–12.7)
POTASSIUM SERPL-SCNC: 4 MMOL/L (ref 3.5–5.3)
PROT SERPL-MCNC: 7.4 G/DL (ref 6.4–8.4)
RBC # BLD AUTO: 4.5 MILLION/UL (ref 3.81–5.12)
SODIUM SERPL-SCNC: 139 MMOL/L (ref 135–147)
TRIGL SERPL-MCNC: 106 MG/DL
TSH SERPL DL<=0.05 MIU/L-ACNC: 2.61 UIU/ML (ref 0.45–4.5)
WBC # BLD AUTO: 5.52 THOUSAND/UL (ref 4.31–10.16)

## 2023-12-12 PROCEDURE — 80053 COMPREHEN METABOLIC PANEL: CPT

## 2023-12-12 PROCEDURE — 36415 COLL VENOUS BLD VENIPUNCTURE: CPT

## 2023-12-12 PROCEDURE — 83036 HEMOGLOBIN GLYCOSYLATED A1C: CPT

## 2023-12-12 PROCEDURE — 80061 LIPID PANEL: CPT

## 2023-12-12 PROCEDURE — 84443 ASSAY THYROID STIM HORMONE: CPT

## 2023-12-12 PROCEDURE — 85025 COMPLETE CBC W/AUTO DIFF WBC: CPT

## 2023-12-13 ENCOUNTER — OFFICE VISIT (OUTPATIENT)
Dept: FAMILY MEDICINE CLINIC | Facility: CLINIC | Age: 73
End: 2023-12-13
Payer: COMMERCIAL

## 2023-12-13 VITALS — RESPIRATION RATE: 16 BRPM | DIASTOLIC BLOOD PRESSURE: 80 MMHG | SYSTOLIC BLOOD PRESSURE: 132 MMHG | HEART RATE: 90 BPM

## 2023-12-13 DIAGNOSIS — R19.7 DIARRHEA, UNSPECIFIED TYPE: Primary | ICD-10-CM

## 2023-12-13 PROCEDURE — 99214 OFFICE O/P EST MOD 30 MIN: CPT | Performed by: FAMILY MEDICINE

## 2023-12-13 RX ORDER — DICYCLOMINE HCL 20 MG
20 TABLET ORAL 3 TIMES DAILY PRN
Qty: 30 TABLET | Refills: 0 | Status: SHIPPED | OUTPATIENT
Start: 2023-12-13 | End: 2023-12-17

## 2023-12-13 NOTE — PATIENT INSTRUCTIONS
1. Diarrhea, unspecified type  -     Celiac Disease Antibody Profile; Future  -     C-reactive protein; Future  -     Calprotectin,Fecal; Future  -     Stool Enteric Bacterial Panel by PCR;  Future     Call us if worsening

## 2023-12-13 NOTE — PROGRESS NOTES
Assessment/Plan:       Problem List Items Addressed This Visit          Other    Diarrhea - Primary     Check labs as below along with CT abd due to mucous and weight loss although mild weight loss, follow up to recheck after labs, patient to call if worsening and consider further work up or GI referral at that time         Relevant Medications    dicyclomine (BENTYL) 20 mg tablet    Other Relevant Orders    Celiac Disease Antibody Profile    C-reactive protein    Calprotectin,Fecal    Stool Enteric Bacterial Panel by PCR    CT abdomen pelvis w contrast         Subjective:      Patient ID: Rod Cerrato is a 72 y.o. female. HPI    72year old with pmh of irritable bowel syndrome, loose stools with mucous for two weeks. Loose stools not water but mucousy. Having stomach discomfort. Losing some weight about 5 lbs in total over last two weeks. Has appetite    No new medications, no nausea, no vomiting. March 2022 last colonoscopy.     Recent labs with no concerning findings, patient very worried she has pancreatic cancer    The following portions of the patient's history were reviewed and updated as appropriate: allergies, current medications, past family history, past medical history, past social history, past surgical history and problem list.      Current Outpatient Medications:     dicyclomine (BENTYL) 20 mg tablet, Take 1 tablet (20 mg total) by mouth 3 (three) times a day as needed (stomach pain), Disp: 30 tablet, Rfl: 0    atorvastatin (LIPITOR) 80 mg tablet, TAKE 1 TABLET BY MOUTH EVERY DAY, Disp: 90 tablet, Rfl: 1    butalbital-acetaminophen-caffeine (FIORICET,ESGIC) -40 mg per tablet, Take 1 tablet by mouth every 4 (four) hours as needed for headaches, Disp: 20 tablet, Rfl: 0    escitalopram (LEXAPRO) 20 mg tablet, TAKE 1 TABLET BY MOUTH EVERY DAY, Disp: 90 tablet, Rfl: 1    gabapentin (NEURONTIN) 300 mg capsule, Take 1 capsule (300 mg total) by mouth daily at bedtime, Disp: 90 capsule, Rfl: 0    Multiple Vitamins-Minerals (MULTIVITAL PO), Take 1 tablet by mouth daily, Disp: , Rfl:     omeprazole (PriLOSEC) 40 MG capsule, TAKE 1 CAPSULE BY MOUTH 2 TIMES A DAY HALF AN HOUR PRIOR TO MEALS, Disp: 180 capsule, Rfl: 1    propranolol (INDERAL LA) 120 mg 24 hr capsule, Take 1 capsule (120 mg total) by mouth daily, Disp: 90 capsule, Rfl: 1    SUMAtriptan (IMITREX) 25 mg tablet, Take 1 tablet (25 mg total) by mouth once as needed for migraine for up to 1 dose, Disp: 9 tablet, Rfl: 0     Review of Systems   Constitutional:  Negative for activity change and appetite change. Respiratory:  Negative for apnea and chest tightness. Cardiovascular:  Negative for chest pain and palpitations. Gastrointestinal:  Negative for abdominal distention and abdominal pain. Musculoskeletal:  Negative for arthralgias and back pain. Objective:      /80   Pulse 90   Resp 16          Physical Exam  Constitutional:       Appearance: Normal appearance. Cardiovascular:      Rate and Rhythm: Normal rate and regular rhythm. Pulses: Normal pulses. Heart sounds: Normal heart sounds. Pulmonary:      Effort: Pulmonary effort is normal.      Breath sounds: Normal breath sounds. Musculoskeletal:         General: Normal range of motion. Neurological:      General: No focal deficit present. Mental Status: She is alert and oriented to person, place, and time.            Rex Solo MD

## 2023-12-13 NOTE — ASSESSMENT & PLAN NOTE
Check labs as below along with CT abd due to mucous and weight loss although mild weight loss, follow up to recheck after labs, patient to call if worsening and consider further work up or GI referral at that time

## 2023-12-14 ENCOUNTER — APPOINTMENT (OUTPATIENT)
Dept: LAB | Facility: CLINIC | Age: 73
End: 2023-12-14
Payer: COMMERCIAL

## 2023-12-14 DIAGNOSIS — R10.10 UPPER ABDOMINAL PAIN: ICD-10-CM

## 2023-12-14 DIAGNOSIS — R19.7 DIARRHEA, UNSPECIFIED TYPE: ICD-10-CM

## 2023-12-14 LAB — CRP SERPL QL: <1 MG/L

## 2023-12-14 PROCEDURE — 36415 COLL VENOUS BLD VENIPUNCTURE: CPT

## 2023-12-14 PROCEDURE — 86364 TISS TRNSGLTMNASE EA IG CLAS: CPT

## 2023-12-14 PROCEDURE — 86258 DGP ANTIBODY EACH IG CLASS: CPT

## 2023-12-14 PROCEDURE — 86140 C-REACTIVE PROTEIN: CPT

## 2023-12-14 PROCEDURE — 82784 ASSAY IGA/IGD/IGG/IGM EACH: CPT

## 2023-12-14 PROCEDURE — 86231 EMA EACH IG CLASS: CPT

## 2023-12-14 RX ORDER — OMEPRAZOLE 40 MG/1
CAPSULE, DELAYED RELEASE ORAL
Qty: 180 CAPSULE | Refills: 1 | Status: SHIPPED | OUTPATIENT
Start: 2023-12-14

## 2023-12-15 ENCOUNTER — HOSPITAL ENCOUNTER (OUTPATIENT)
Dept: RADIOLOGY | Facility: HOSPITAL | Age: 73
Discharge: HOME | End: 2023-12-15
Attending: FAMILY MEDICINE
Payer: COMMERCIAL

## 2023-12-15 DIAGNOSIS — G89.29 CHRONIC MIDLINE LOW BACK PAIN WITH LEFT-SIDED SCIATICA: ICD-10-CM

## 2023-12-15 DIAGNOSIS — G43.701 CHRONIC MIGRAINE WITHOUT AURA WITH STATUS MIGRAINOSUS, NOT INTRACTABLE: ICD-10-CM

## 2023-12-15 DIAGNOSIS — M54.42 CHRONIC MIDLINE LOW BACK PAIN WITH LEFT-SIDED SCIATICA: ICD-10-CM

## 2023-12-15 DIAGNOSIS — R19.7 DIARRHEA, UNSPECIFIED TYPE: ICD-10-CM

## 2023-12-15 PROCEDURE — 74177 CT ABD & PELVIS W/CONTRAST: CPT

## 2023-12-15 PROCEDURE — G1004 CDSM NDSC: HCPCS

## 2023-12-15 RX ADMIN — IOHEXOL 100 ML: 350 INJECTION, SOLUTION INTRAVENOUS at 13:21

## 2023-12-16 DIAGNOSIS — R19.7 DIARRHEA, UNSPECIFIED TYPE: ICD-10-CM

## 2023-12-16 LAB
ENDOMYSIUM IGA SER QL: POSITIVE
GLIADIN PEPTIDE IGA SER-ACNC: 108 UNITS (ref 0–19)
GLIADIN PEPTIDE IGG SER-ACNC: 49 UNITS (ref 0–19)
IGA SERPL-MCNC: 222 MG/DL (ref 64–422)
TTG IGA SER-ACNC: 40 U/ML (ref 0–3)
TTG IGG SER-ACNC: 6 U/ML (ref 0–5)

## 2023-12-17 RX ORDER — DICYCLOMINE HCL 20 MG
20 TABLET ORAL 3 TIMES DAILY PRN
Qty: 90 TABLET | Refills: 1 | Status: SHIPPED | OUTPATIENT
Start: 2023-12-17

## 2023-12-18 RX ORDER — SUMATRIPTAN 25 MG/1
25 TABLET, FILM COATED ORAL ONCE AS NEEDED
Qty: 9 TABLET | Refills: 0 | Status: SHIPPED | OUTPATIENT
Start: 2023-12-18

## 2023-12-18 RX ORDER — GABAPENTIN 300 MG/1
300 CAPSULE ORAL
Qty: 90 CAPSULE | Refills: 0 | Status: SHIPPED | OUTPATIENT
Start: 2023-12-18

## 2023-12-21 DIAGNOSIS — G43.909 MIGRAINE WITHOUT STATUS MIGRAINOSUS, NOT INTRACTABLE, UNSPECIFIED MIGRAINE TYPE: ICD-10-CM

## 2023-12-22 RX ORDER — BUTALBITAL, ACETAMINOPHEN AND CAFFEINE 50; 325; 40 MG/1; MG/1; MG/1
1 TABLET ORAL EVERY 4 HOURS PRN
Qty: 20 TABLET | Refills: 0 | Status: SHIPPED | OUTPATIENT
Start: 2023-12-22

## 2023-12-29 ENCOUNTER — RA CDI HCC (OUTPATIENT)
Dept: OTHER | Facility: HOSPITAL | Age: 73
End: 2023-12-29

## 2024-01-05 ENCOUNTER — OFFICE VISIT (OUTPATIENT)
Dept: FAMILY MEDICINE CLINIC | Facility: CLINIC | Age: 74
End: 2024-01-05
Payer: COMMERCIAL

## 2024-01-05 VITALS
DIASTOLIC BLOOD PRESSURE: 76 MMHG | HEART RATE: 60 BPM | BODY MASS INDEX: 25.45 KG/M2 | WEIGHT: 167.4 LBS | SYSTOLIC BLOOD PRESSURE: 124 MMHG | OXYGEN SATURATION: 98 %

## 2024-01-05 DIAGNOSIS — E78.5 HYPERLIPIDEMIA, UNSPECIFIED HYPERLIPIDEMIA TYPE: ICD-10-CM

## 2024-01-05 DIAGNOSIS — K90.0 CELIAC DISEASE: ICD-10-CM

## 2024-01-05 DIAGNOSIS — R73.03 PREDIABETES: ICD-10-CM

## 2024-01-05 DIAGNOSIS — M54.16 LUMBAR RADICULOPATHY: Primary | ICD-10-CM

## 2024-01-05 PROCEDURE — 99214 OFFICE O/P EST MOD 30 MIN: CPT | Performed by: PHYSICIAN ASSISTANT

## 2024-01-05 RX ORDER — ROSUVASTATIN CALCIUM 40 MG/1
40 TABLET, COATED ORAL DAILY
Qty: 90 TABLET | Refills: 1 | Status: SHIPPED | OUTPATIENT
Start: 2024-01-05

## 2024-01-05 NOTE — PROGRESS NOTES
FAMILY PRACTICE OFFICE VISIT  [facility] - [facility] PRIMARY CARE       NAME: You Desir  AGE: 73 y.o. SEX: female       : 1950        MRN: [id number]    DATE: 2024  TIME: 9:06 PM    Assessment and Plan     Problem List Items Addressed This Visit          Digestive    Celiac disease     Patient reports her symptoms have resolved since eliminating gluten from her diet.  She was encouraged to stay consistent with this diet.  She declines GI follow-up at this time.            Nervous and Auditory    Lumbar radiculopathy - Primary     Patient now interested in following up with the spine and pain center.  Referral entered.         Relevant Orders    Ambulatory referral to Spine & Pain Management       Other    Hyperlipidemia     Not controlled on atorvastatin 80 mg daily.  Will switch to Crestor 40 mg daily and recheck cholesterol levels in several months.         Relevant Medications    rosuvastatin (CRESTOR) 40 MG tablet    Other Relevant Orders    Lipid Panel with Direct LDL reflex    Prediabetes     Recheck prior to next visit.         Relevant Orders    Hemoglobin A1C    Comprehensive metabolic panel       Lumbar radiculopathy  Patient now interested in following up with the spine and pain center.  Referral entered.    Hyperlipidemia  Not controlled on atorvastatin 80 mg daily.  Will switch to Crestor 40 mg daily and recheck cholesterol levels in several months.    Prediabetes  Recheck prior to next visit.    Celiac disease  Patient reports her symptoms have resolved since eliminating gluten from her diet.  She was encouraged to stay consistent with this diet.  She declines GI follow-up at this time.            Chief Complaint     Chief Complaint   Patient presents with    Follow-up     4w       History of Present Illness   You Desir is a 73 y.o.-year-old female who presents for follow-up on diarrhea/celiac disease.     4-week recheck on diarrhea - saw  Dr. Gregg - testing was positive for possible celiac disease-presented last month with 2-week history of loose stools, stomach discomfort, 5 pound weight loss, and mucus in the stools - did not do stool testing-CRP was normal at less than 1 - CT abd/pelvis was noncontributory-avoiding gluten since results came back several weeks ago and feeling much better              Review of Systems   Review of Systems   Gastrointestinal:  Negative for abdominal pain and diarrhea.   Musculoskeletal:  Positive for back pain.       Active Problem List     Patient Active Problem List   Diagnosis    Abnormal EKG    Benign neoplasm of colon    Breast tenderness in female    Neck pain    Hearing loss    Displacement of thoracic intervertebral disc without myelopathy    Hyperlipidemia    Irritable bowel syndrome    Levoscoliosis    Lightheadedness    Chronic low back pain    Memory difficulties    Migraine    Onychomycosis    Osteoarthritis of both hands    Palpitations    Skin lesions    Anxiety    Gastritis    Overweight (BMI 25.0-29.9)    Lumbar radiculopathy    Spinal stenosis of lumbar region    Diarrhea    Prediabetes    Celiac disease         Past Medical History:  Past Medical History:   Diagnosis Date    SHAY (acute kidney injury) ([facility]) 2/3/2021    Anxiety 2018    Arthritis 2008    Back pain     Depression     GERD (gastroesophageal reflux disease) 2020    Headache(784.0) 1978    Hyperlipidemia        Past Surgical History:  Past Surgical History:   Procedure Laterality Date    APPENDECTOMY      COLONOSCOPY      HYSTERECTOMY         Family History:  Family History   Problem Relation Age of Onset    Heart failure Mother     Arthritis Mother     Coronary artery disease Mother     Lung cancer Father     Cancer Father     Lupus Daughter     No Known Problems Maternal Grandmother     No Known Problems Maternal Grandfather     Cancer Paternal Grandfather         unknown type    Multiple sclerosis Brother     Bipolar disorder Son      Asthma Son     Breast cancer Maternal Aunt     No Known Problems Maternal Aunt     No Known Problems Paternal Aunt     No Known Problems Paternal Aunt     No Known Problems Paternal Aunt        Social History:  Social History     Socioeconomic History    Marital status: /Civil Union     Spouse name: Not on file    Number of children: Not on file    Years of education: Not on file    Highest education level: Not on file   Occupational History    Not on file   Tobacco Use    Smoking status: Former     Current packs/day: 0.00     Average packs/day: 1 pack/day for 20.0 years (20.0 ttl pk-yrs)     Types: Cigarettes     Start date:      Quit date:      Years since quittin.0    Smokeless tobacco: Never   Vaping Use    Vaping status: Never Used   Substance and Sexual Activity    Alcohol use: Yes     Alcohol/week: 3.0 standard drinks of alcohol     Types: 3 Glasses of wine per week     Comment: 1/2 glass of wine at a time    Drug use: Never    Sexual activity: Not on file   Other Topics Concern    Not on file   Social History Narrative    Not on file     Social Determinants of Health     Financial Resource Strain: Low Risk  (10/25/2023)    Overall Financial Resource Strain (CARDIA)     Difficulty of Paying Living Expenses: Not very hard   Food Insecurity: Not on file   Transportation Needs: No Transportation Needs (10/25/2023)    PRAPARE - Transportation     Lack of Transportation (Medical): No     Lack of Transportation (Non-Medical): No   Physical Activity: Not on file   Stress: Not on file   Social Connections: Not on file   Intimate Partner Violence: Not on file   Housing Stability: Not on file       Objective     Vitals:    24 0922   BP: 124/76   BP Location: Left arm   Patient Position: Sitting   Cuff Size: Standard   Pulse: 60   SpO2: 98%   Weight: 75.9 kg (167 lb 6.4 oz)     Wt Readings from Last 3 Encounters:   24 75.9 kg (167 lb 6.4 oz)   10/30/23 75.8 kg (167 lb 3.2 oz)   23  75.3 kg (166 lb 0.1 oz)       Physical Exam  Vitals reviewed.   Constitutional:       General: She is not in acute distress.     Appearance: Normal appearance. She is well-developed. She is obese. She is not ill-appearing.   HENT:      Head: Normocephalic and atraumatic.   Neck:      Thyroid: No thyromegaly.   Cardiovascular:      Rate and Rhythm: Normal rate and regular rhythm.      Pulses: Normal pulses.      Heart sounds: Normal heart sounds. No murmur heard.     Comments: No carotid bruits noted  Pulmonary:      Effort: Pulmonary effort is normal.      Breath sounds: Normal breath sounds. No wheezing, rhonchi or rales.   Musculoskeletal:      Cervical back: Neck supple.      Right lower leg: No edema.      Left lower leg: No edema.   Lymphadenopathy:      Cervical: No cervical adenopathy.   Neurological:      Mental Status: She is alert.   Psychiatric:         Mood and Affect: Mood normal.         Behavior: Behavior normal.         Thought Content: Thought content normal.         Judgment: Judgment normal.         Pertinent Laboratory/Diagnostic Studies:  Lab Results   Component Value Date    BUN 17 12/12/2023    CREATININE 0.82 12/12/2023    CALCIUM 9.4 12/12/2023     01/15/2018    K 4.0 12/12/2023    CO2 29 12/12/2023     12/12/2023     Lab Results   Component Value Date    ALT 16 12/12/2023    AST 18 12/12/2023    ALKPHOS 64 12/12/2023    BILITOT 0.4 01/15/2018       Lab Results   Component Value Date    WBC 5.52 12/12/2023    HGB 13.8 12/12/2023    HCT 42.5 12/12/2023    MCV 94 12/12/2023     12/12/2023     Lab Results   Component Value Date    CHOL 224 (H) 01/15/2018     Lab Results   Component Value Date    TRIG 106 12/12/2023     Lab Results   Component Value Date    HDL 81 12/12/2023     Lab Results   Component Value Date    LDLCALC 128 (H) 12/12/2023     Lab Results   Component Value Date    HGBA1C 5.9 (H) 12/12/2023       Results for orders placed or performed in visit on 12/14/23    Celiac Disease Antibody Profile   Result Value Ref Range    IgA 222 64 - 422 mg/dL    Gliadin IgA 108 (H) 0 - 19 units    Gliadin IgG 49 (H) 0 - 19 units    Tissue Transglut Ab IGG 6 (H) 0 - 5 U/mL    TISSUE TRANSGLUTAMINASE IGA 40 (H) 0 - 3 U/mL    Endomysial IgA Positive (A) Negative   C-reactive protein   Result Value Ref Range    CRP <1.0 <3.0 mg/L           ALLERGIES:  No Known Allergies    Current Medications     Current Outpatient Medications   Medication Sig Dispense Refill    butalbital-acetaminophen-caffeine (FIORICET,ESGIC) -40 mg per tablet Take 1 tablet by mouth every 4 (four) hours as needed for headaches 20 tablet 0    dicyclomine (BENTYL) 20 mg tablet TAKE 1 TABLET (20 MG TOTAL) BY MOUTH 3 (THREE) TIMES A DAY AS NEEDED (STOMACH PAIN) 90 tablet 1    escitalopram (LEXAPRO) 20 mg tablet TAKE 1 TABLET BY MOUTH EVERY DAY 90 tablet 1    gabapentin (NEURONTIN) 300 mg capsule Take 1 capsule (300 mg total) by mouth daily at bedtime 90 capsule 0    Multiple Vitamins-Minerals (MULTIVITAL PO) Take 1 tablet by mouth daily      omeprazole (PriLOSEC) 40 MG capsule TAKE 1 CAPSULE BY MOUTH 2 TIMES A DAY HALF AN HOUR PRIOR TO MEALS 180 capsule 1    propranolol (INDERAL LA) 120 mg 24 hr capsule Take 1 capsule (120 mg total) by mouth daily 90 capsule 1    rosuvastatin (CRESTOR) 40 MG tablet Take 1 tablet (40 mg total) by mouth daily 90 tablet 1    SUMAtriptan (IMITREX) 25 mg tablet Take 1 tablet (25 mg total) by mouth once as needed for migraine for up to 9 doses 9 tablet 0     No current facility-administered medications for this visit.         Health Maintenance     Health Maintenance   Topic Date Due    DXA SCAN  Never done    Osteoporosis Screening  Never done    COVID-19 Vaccine (5 - 2023-24 season) 09/01/2023    Breast Cancer Screening: Mammogram  10/24/2023    Fall Risk  10/30/2024    Urinary Incontinence Screening  10/30/2024    Medicare Annual Wellness Visit (AWV)  10/30/2024    Depression Screening   01/05/2025    Colorectal Cancer Screening  03/16/2027    Hepatitis C Screening  Completed    Pneumococcal Vaccine: 65+ Years  Completed    Influenza Vaccine  Completed    HIB Vaccine  Aged Out    IPV Vaccine  Aged Out    Hepatitis A Vaccine  Aged Out    Meningococcal ACWY Vaccine  Aged Out    HPV Vaccine  Aged Out     Immunization History   Administered Date(s) Administered    COVID-19 PFIZER VACCINE 0.3 ML IM 03/10/2021, 04/07/2021    COVID-19 Pfizer Vac BIVALENT Jose-sucrose 12 Yr+ IM 11/17/2022    COVID-19 Pfizer vac (Jose-sucrose, gray cap) 12 yr+ IM 06/30/2022    INFLUENZA 01/01/2006    Influenza Quadrivalent Preservative Free 3 years and older IM 10/17/2014, 10/01/2015    Influenza Split High Dose Preservative Free IM 01/09/2018    Influenza, high dose seasonal 0.7 mL 10/23/2019, 10/21/2020, 10/25/2021, 10/28/2022, 10/30/2023    Influenza, seasonal, injectable 10/18/2011, 12/06/2013    Pneumococcal Conjugate 13-Valent 01/09/2018    Pneumococcal Polysaccharide PPV23 03/25/2019    Tdap 01/31/2014, 01/29/2021       Sabra Rueda PA-C  1/7/2024 9:06 PM  [facility]

## 2024-01-07 PROBLEM — K90.0 CELIAC DISEASE: Status: ACTIVE | Noted: 2024-01-07

## 2024-01-07 PROBLEM — R73.03 PREDIABETES: Status: ACTIVE | Noted: 2024-01-07

## 2024-01-08 NOTE — ASSESSMENT & PLAN NOTE
Patient reports her symptoms have resolved since eliminating gluten from her diet.  She was encouraged to stay consistent with this diet.  She declines GI follow-up at this time.

## 2024-01-08 NOTE — ASSESSMENT & PLAN NOTE
Not controlled on atorvastatin 80 mg daily.  Will switch to Crestor 40 mg daily and recheck cholesterol levels in several months.

## 2024-01-14 DIAGNOSIS — R19.7 DIARRHEA, UNSPECIFIED TYPE: ICD-10-CM

## 2024-01-15 RX ORDER — DICYCLOMINE HCL 20 MG
20 TABLET ORAL 3 TIMES DAILY PRN
Qty: 270 TABLET | Refills: 1 | Status: SHIPPED | OUTPATIENT
Start: 2024-01-15

## 2024-02-02 DIAGNOSIS — G43.701 CHRONIC MIGRAINE WITHOUT AURA WITH STATUS MIGRAINOSUS, NOT INTRACTABLE: ICD-10-CM

## 2024-02-02 RX ORDER — SUMATRIPTAN 25 MG/1
25 TABLET, FILM COATED ORAL ONCE AS NEEDED
Qty: 9 TABLET | Refills: 1 | Status: SHIPPED | OUTPATIENT
Start: 2024-02-02

## 2024-02-12 ENCOUNTER — OFFICE VISIT (OUTPATIENT)
Dept: PAIN MEDICINE | Facility: MEDICAL CENTER | Age: 74
End: 2024-02-12
Payer: COMMERCIAL

## 2024-02-12 ENCOUNTER — APPOINTMENT (OUTPATIENT)
Dept: RADIOLOGY | Facility: MEDICAL CENTER | Age: 74
End: 2024-02-12
Payer: COMMERCIAL

## 2024-02-12 VITALS — HEIGHT: 68 IN | WEIGHT: 168 LBS | BODY MASS INDEX: 25.46 KG/M2

## 2024-02-12 DIAGNOSIS — M79.18 MYOFASCIAL PAIN SYNDROME: ICD-10-CM

## 2024-02-12 DIAGNOSIS — M54.2 NECK PAIN: Primary | ICD-10-CM

## 2024-02-12 DIAGNOSIS — M54.2 NECK PAIN: ICD-10-CM

## 2024-02-12 DIAGNOSIS — M54.50 CHRONIC BILATERAL LOW BACK PAIN WITHOUT SCIATICA: ICD-10-CM

## 2024-02-12 DIAGNOSIS — G89.29 CHRONIC BILATERAL LOW BACK PAIN WITHOUT SCIATICA: ICD-10-CM

## 2024-02-12 DIAGNOSIS — M47.816 LUMBAR SPONDYLOSIS: ICD-10-CM

## 2024-02-12 DIAGNOSIS — M41.126 ADOLESCENT IDIOPATHIC SCOLIOSIS OF LUMBAR REGION: ICD-10-CM

## 2024-02-12 PROCEDURE — 72050 X-RAY EXAM NECK SPINE 4/5VWS: CPT

## 2024-02-12 PROCEDURE — 99214 OFFICE O/P EST MOD 30 MIN: CPT | Performed by: PHYSICAL MEDICINE & REHABILITATION

## 2024-02-12 NOTE — PROGRESS NOTES
Assessment:  1. Neck pain    2. Chronic bilateral low back pain without sciatica    3. Lumbar spondylosis    4. Adolescent idiopathic scoliosis of lumbar region    5. Myofascial pain syndrome        Plan:  X-ray cervical spine  Initiate physical therapy for neck and lower back pain complaints.  Follow-up with our office at the conclusion of physical therapy if she still having pain consider medial branch nerve blocks and radiofrequency ablation to the appropriate areas    My impressions and treatment recommendations were discussed in detail with the patient who verbalized understanding and had no further questions.  Discharge instructions were provided. I personally saw and examined the patient and I agree with the above discussed plan of care.    Orders Placed This Encounter   Procedures    XR spine cervical complete 4 or 5 vw non injury     Standing Status:   Future     Standing Expiration Date:   2/12/2028     Scheduling Instructions:      Bring along any outside films relating to this procedure.           Order Specific Question:   Reason for Exam:     Answer:   chronic neck pain    Ambulatory referral to Physical Therapy     Standing Status:   Future     Standing Expiration Date:   2/12/2025     Referral Priority:   Routine     Referral Type:   Physical Therapy     Referral Reason:   Specialty Services Required     Requested Specialty:   Physical Therapy     Number of Visits Requested:   1     Expiration Date:   2/12/2025     No orders of the defined types were placed in this encounter.      History of Present Illness:  You Desir is a 73 y.o. female who presents for a follow up office visit in regards to right-sided neck pain and left-sided lower back pain complaints.  This may be multifactorial in nature and comprised of both osteoarthritis as well as myofascial pain components.  She did have a CT scan of the abdomen which demonstrates some significant lumbar scoliosis.  Her pain is currently rated as  a 7/10 and intermittent in nature described as a dull aching sensation as well as pressure-like and shooting depending on activities.    Overall she is experiencing some moderate to severe functional deficits especially with family home responsibilities and recreational activities.    She does note decreased range of motion and joint stiffness.    I have personally reviewed and/or updated the patient's past medical history, past surgical history, family history, social history, current medications, allergies, and vital signs today.     Review of Systems   Respiratory:  Negative for shortness of breath.    Cardiovascular:  Negative for chest pain.   Gastrointestinal:  Negative for constipation, diarrhea, nausea and vomiting.   Musculoskeletal:  Positive for back pain, joint swelling and myalgias. Negative for arthralgias and gait problem.   Skin:  Negative for rash.   Neurological:  Negative for dizziness, seizures and weakness.   All other systems reviewed and are negative.      Patient Active Problem List   Diagnosis    Abnormal EKG    Benign neoplasm of colon    Breast tenderness in female    Neck pain    Hearing loss    Displacement of thoracic intervertebral disc without myelopathy    Hyperlipidemia    Irritable bowel syndrome    Levoscoliosis    Lightheadedness    Chronic low back pain    Memory difficulties    Migraine    Onychomycosis    Osteoarthritis of both hands    Palpitations    Skin lesions    Anxiety    Gastritis    Overweight (BMI 25.0-29.9)    Lumbar radiculopathy    Spinal stenosis of lumbar region    Diarrhea    Prediabetes    Celiac disease       Past Medical History:   Diagnosis Date    SHAY (acute kidney injury) ([facility]) 2/3/2021    Anxiety 2018    Arthritis 2008    Back pain     Depression     GERD (gastroesophageal reflux disease) 2020    Headache(784.0) 1978    Hyperlipidemia        Past Surgical History:   Procedure Laterality Date    APPENDECTOMY      COLONOSCOPY      HYSTERECTOMY          Family History   Problem Relation Age of Onset    Heart failure Mother     Arthritis Mother     Coronary artery disease Mother     Lung cancer Father     Cancer Father     Lupus Daughter     No Known Problems Maternal Grandmother     No Known Problems Maternal Grandfather     Cancer Paternal Grandfather         unknown type    Multiple sclerosis Brother     Bipolar disorder Son     Asthma Son     Breast cancer Maternal Aunt     No Known Problems Maternal Aunt     No Known Problems Paternal Aunt     No Known Problems Paternal Aunt     No Known Problems Paternal Aunt        Social History     Occupational History    Not on file   Tobacco Use    Smoking status: Former     Current packs/day: 0.00     Average packs/day: 1 pack/day for 20.0 years (20.0 ttl pk-yrs)     Types: Cigarettes     Start date:      Quit date:      Years since quittin.1    Smokeless tobacco: Never   Vaping Use    Vaping status: Never Used   Substance and Sexual Activity    Alcohol use: Yes     Alcohol/week: 3.0 standard drinks of alcohol     Types: 3 Glasses of wine per week     Comment: 1/2 glass of wine at a time    Drug use: Never    Sexual activity: Not on file       Current Outpatient Medications on File Prior to Visit   Medication Sig    butalbital-acetaminophen-caffeine (FIORICET,ESGIC) -40 mg per tablet Take 1 tablet by mouth every 4 (four) hours as needed for headaches    dicyclomine (BENTYL) 20 mg tablet TAKE 1 TABLET (20 MG TOTAL) BY MOUTH 3 (THREE) TIMES A DAY AS NEEDED (STOMACH PAIN)    escitalopram (LEXAPRO) 20 mg tablet TAKE 1 TABLET BY MOUTH EVERY DAY    gabapentin (NEURONTIN) 300 mg capsule Take 1 capsule (300 mg total) by mouth daily at bedtime    Multiple Vitamins-Minerals (MULTIVITAL PO) Take 1 tablet by mouth daily    omeprazole (PriLOSEC) 40 MG capsule TAKE 1 CAPSULE BY MOUTH 2 TIMES A DAY HALF AN HOUR PRIOR TO MEALS    propranolol (INDERAL LA) 120 mg 24 hr capsule Take 1 capsule (120 mg total) by  "mouth daily    rosuvastatin (CRESTOR) 40 MG tablet Take 1 tablet (40 mg total) by mouth daily    SUMAtriptan (IMITREX) 25 mg tablet Take 1 tablet (25 mg total) by mouth once as needed for migraine for up to 18 doses     No current facility-administered medications on file prior to visit.       No Known Allergies    Physical Exam:    Ht 5' 8\" (1.727 m)   Wt 76.2 kg (168 lb)   BMI 25.54 kg/m²     Constitutional:normal, well developed, well nourished, alert, in no distress and non-toxic and no overt pain behavior.  Eyes:anicteric  HEENT:grossly intact  Neck:supple, symmetric, trachea midline and no masses   Pulmonary:even and unlabored  Cardiovascular:No edema or pitting edema present  Psychiatric:Mood and affect appropriate  Neurologic:Cranial Nerves II-XII grossly intact  Musculoskeletal:normal, except for some tenderness to palpation along the lower lumbar facet joints, limitation in lumbar extension is noted but does not significantly reproduce pain at this time, sidebending and rotation does reproduce pain    Imaging    "

## 2024-02-13 ENCOUNTER — TELEPHONE (OUTPATIENT)
Dept: PAIN MEDICINE | Facility: CLINIC | Age: 74
End: 2024-02-13

## 2024-02-13 NOTE — TELEPHONE ENCOUNTER
Caller: You    Doctor: Iker    Reason for call: Pt is returning RN call     Call back#: [phone]

## 2024-02-13 NOTE — TELEPHONE ENCOUNTER
XR spine cervical complete 4 or 5 vw non injury ----- Message from Laurent Dong DO sent at 2/13/2024 10:41 AM EST -----  There is severe disc space narrowing with osteophytosis at C5-C6, C6-C7 and C7-T1. There is facet disease at these levels as well.    CONTINUE WITH CURRENT PLAN

## 2024-03-14 DIAGNOSIS — G43.909 MIGRAINE WITHOUT STATUS MIGRAINOSUS, NOT INTRACTABLE, UNSPECIFIED MIGRAINE TYPE: ICD-10-CM

## 2024-03-18 DIAGNOSIS — G89.29 CHRONIC MIDLINE LOW BACK PAIN WITH LEFT-SIDED SCIATICA: ICD-10-CM

## 2024-03-18 DIAGNOSIS — M54.42 CHRONIC MIDLINE LOW BACK PAIN WITH LEFT-SIDED SCIATICA: ICD-10-CM

## 2024-03-18 RX ORDER — BUTALBITAL, ACETAMINOPHEN AND CAFFEINE 50; 325; 40 MG/1; MG/1; MG/1
1 TABLET ORAL EVERY 4 HOURS PRN
Qty: 20 TABLET | Refills: 0 | Status: SHIPPED | OUTPATIENT
Start: 2024-03-18

## 2024-03-19 RX ORDER — GABAPENTIN 300 MG/1
300 CAPSULE ORAL
Qty: 90 CAPSULE | Refills: 0 | Status: SHIPPED | OUTPATIENT
Start: 2024-03-19

## 2024-04-25 DIAGNOSIS — I10 ESSENTIAL HYPERTENSION: ICD-10-CM

## 2024-04-25 RX ORDER — PROPRANOLOL HYDROCHLORIDE 120 MG/1
120 CAPSULE, EXTENDED RELEASE ORAL DAILY
Qty: 90 CAPSULE | Refills: 1 | Status: SHIPPED | OUTPATIENT
Start: 2024-04-25

## 2024-04-30 ENCOUNTER — APPOINTMENT (OUTPATIENT)
Dept: LAB | Facility: CLINIC | Age: 74
End: 2024-04-30
Payer: COMMERCIAL

## 2024-04-30 DIAGNOSIS — E78.5 HYPERLIPIDEMIA, UNSPECIFIED HYPERLIPIDEMIA TYPE: ICD-10-CM

## 2024-04-30 DIAGNOSIS — R73.03 PREDIABETES: ICD-10-CM

## 2024-04-30 LAB
CHOLEST SERPL-MCNC: 191 MG/DL
EST. AVERAGE GLUCOSE BLD GHB EST-MCNC: 123 MG/DL
HBA1C MFR BLD: 5.9 %
HDLC SERPL-MCNC: 78 MG/DL
LDLC SERPL CALC-MCNC: 96 MG/DL (ref 0–100)
TRIGL SERPL-MCNC: 83 MG/DL

## 2024-04-30 PROCEDURE — 36415 COLL VENOUS BLD VENIPUNCTURE: CPT

## 2024-04-30 PROCEDURE — 80053 COMPREHEN METABOLIC PANEL: CPT

## 2024-04-30 PROCEDURE — 80061 LIPID PANEL: CPT

## 2024-05-01 ENCOUNTER — OFFICE VISIT (OUTPATIENT)
Dept: FAMILY MEDICINE CLINIC | Facility: CLINIC | Age: 74
End: 2024-05-01
Payer: COMMERCIAL

## 2024-05-01 VITALS
SYSTOLIC BLOOD PRESSURE: 110 MMHG | DIASTOLIC BLOOD PRESSURE: 68 MMHG | TEMPERATURE: 97.8 F | OXYGEN SATURATION: 98 % | HEART RATE: 59 BPM | WEIGHT: 165 LBS | BODY MASS INDEX: 25.09 KG/M2

## 2024-05-01 DIAGNOSIS — E78.5 HYPERLIPIDEMIA, UNSPECIFIED HYPERLIPIDEMIA TYPE: ICD-10-CM

## 2024-05-01 DIAGNOSIS — K90.0 CELIAC DISEASE: ICD-10-CM

## 2024-05-01 DIAGNOSIS — R10.10 UPPER ABDOMINAL PAIN: ICD-10-CM

## 2024-05-01 DIAGNOSIS — M54.16 LUMBAR RADICULOPATHY: ICD-10-CM

## 2024-05-01 DIAGNOSIS — Z12.31 BREAST CANCER SCREENING BY MAMMOGRAM: ICD-10-CM

## 2024-05-01 DIAGNOSIS — F41.9 ANXIETY: ICD-10-CM

## 2024-05-01 DIAGNOSIS — R73.03 PREDIABETES: ICD-10-CM

## 2024-05-01 DIAGNOSIS — K29.70 GASTRITIS, PRESENCE OF BLEEDING UNSPECIFIED, UNSPECIFIED CHRONICITY, UNSPECIFIED GASTRITIS TYPE: ICD-10-CM

## 2024-05-01 DIAGNOSIS — G43.701 CHRONIC MIGRAINE WITHOUT AURA WITH STATUS MIGRAINOSUS, NOT INTRACTABLE: ICD-10-CM

## 2024-05-01 DIAGNOSIS — G43.909 MIGRAINE WITHOUT STATUS MIGRAINOSUS, NOT INTRACTABLE, UNSPECIFIED MIGRAINE TYPE: Primary | ICD-10-CM

## 2024-05-01 PROCEDURE — 99214 OFFICE O/P EST MOD 30 MIN: CPT | Performed by: PHYSICIAN ASSISTANT

## 2024-05-01 PROCEDURE — G2211 COMPLEX E/M VISIT ADD ON: HCPCS | Performed by: PHYSICIAN ASSISTANT

## 2024-05-01 RX ORDER — OMEPRAZOLE 40 MG/1
40 CAPSULE, DELAYED RELEASE ORAL DAILY
Start: 2024-05-01

## 2024-05-01 NOTE — PROGRESS NOTES
FAMILY PRACTICE OFFICE VISIT  [facility] PRIMARY CARE       NAME: Yuo Desir  AGE: 73 y.o. SEX: female       : 1950        MRN: [id number]    DATE: 2024  TIME: 2:35 AM    Assessment and Plan     Problem List Items Addressed This Visit          Cardiovascular and Mediastinum    Migraine     Controlled.  Reports migraines about 3 times per month that respond well to Fioricet and Imitrex.  Continue propranolol 120 mg daily.  Encouraged to see if she responds to Imitrex alone as opposed to combining it with Fioricet.  Encouraged to use Fioricet only if not resolving with Imitrex.            Digestive    Gastritis     Controlled.  Continue decreased dosing of omeprazole at 40 mg every 1 to 2 days.         Relevant Medications    omeprazole (PriLOSEC) 40 MG capsule    Celiac disease     Controlled.  Continue avoiding gluten.            Nervous and Auditory    Lumbar radiculopathy     Very well-controlled per patient.  Continue gabapentin 300 mg at bedtime.            Behavioral Health    Anxiety     Controlled.  Continue Lexapro 20 mg daily.            Other    Hyperlipidemia     Improved.  Continue Crestor 40 mg daily.         Prediabetes     Stable with an A1c of 5.9%.  Limit carbohydrate intake.  Will continue to monitor.          Other Visit Diagnoses       Breast cancer screening by mammogram    -  Primary    Relevant Orders    Mammo screening bilateral w 3d & cad    Upper abdominal pain        Relevant Medications    omeprazole (PriLOSEC) 40 MG capsule            Migraine  Controlled.  Reports migraines about 3 times per month that respond well to Fioricet and Imitrex.  Continue propranolol 120 mg daily.  Encouraged to see if she responds to Imitrex alone as opposed to combining it with Fioricet.  Encouraged to use Fioricet only if not resolving with Imitrex.    Celiac disease  Controlled.  Continue avoiding gluten.    Gastritis  Controlled.   Continue decreased dosing of omeprazole at 40 mg every 1 to 2 days.    Lumbar radiculopathy  Very well-controlled per patient.  Continue gabapentin 300 mg at bedtime.    Anxiety  Controlled.  Continue Lexapro 20 mg daily.    Prediabetes  Stable with an A1c of 5.9%.  Limit carbohydrate intake.  Will continue to monitor.    Hyperlipidemia  Improved.  Continue Crestor 40 mg daily.            Chief Complaint     Chief Complaint   Patient presents with    Follow-up       History of Present Illness   You Desir is a 73 y.o.-year-old female who presents for follow-up on chronic conditions.     Celiac disease - still doing well with gluten-free eating     Gastritis - on omeprazole 40 mg  - attempted weaning and has been able to decrease to once every 1-2 days    Migraines - on propranolol 120 mg daily and Fioricet or Imitrex as needed - notes that she has migraines about 3 times a month - notes that the Imitrex and Fioricet help (takes 1/2 tab of each)     Lumbar radiculopathy - on gabapentin 300 mg at bedtime - very well-controlled    Hyperlipidemia - on Crestor 40 mg daily now - last LDL was 96 yesterday (down from 128)    Anxiety - controlled on Lexapro 20 mg daily - notes that it helps her feel calm and not short-tempered    Prediabetes - A1c was stable yesterday at 5.9%           Review of Systems   Review of Systems   Constitutional:  Negative for chills and fever.   Respiratory:  Negative for shortness of breath.    Cardiovascular:  Negative for chest pain, palpitations and leg swelling.   Neurological:  Negative for dizziness and headaches.       Active Problem List     Patient Active Problem List   Diagnosis    Abnormal EKG    Benign neoplasm of colon    Breast tenderness in female    Neck pain    Hearing loss    Displacement of thoracic intervertebral disc without myelopathy    Hyperlipidemia    Irritable bowel syndrome    Levoscoliosis    Lightheadedness    Chronic low back pain    Memory difficulties     Migraine    Onychomycosis    Osteoarthritis of both hands    Palpitations    Skin lesions    Anxiety    Gastritis    Overweight (BMI 25.0-29.9)    Lumbar radiculopathy    Spinal stenosis of lumbar region    Diarrhea    Prediabetes    Celiac disease         Past Medical History:  Past Medical History:   Diagnosis Date    SHAY (acute kidney injury) ([facility]) 2/3/2021    Anxiety 2018    Arthritis 2008    Back pain     Depression     GERD (gastroesophageal reflux disease)     Headache(784.0) 1978    Hyperlipidemia        Past Surgical History:  Past Surgical History:   Procedure Laterality Date    APPENDECTOMY      COLONOSCOPY      HYSTERECTOMY         Family History:  Family History   Problem Relation Age of Onset    Heart failure Mother     Arthritis Mother     Coronary artery disease Mother     Lung cancer Father     Cancer Father     Lupus Daughter     No Known Problems Maternal Grandmother     No Known Problems Maternal Grandfather     Cancer Paternal Grandfather         unknown type    Multiple sclerosis Brother     Bipolar disorder Son     Asthma Son     Breast cancer Maternal Aunt     No Known Problems Maternal Aunt     No Known Problems Paternal Aunt     No Known Problems Paternal Aunt     No Known Problems Paternal Aunt        Social History:  Social History     Socioeconomic History    Marital status: /Civil Union     Spouse name: Not on file    Number of children: Not on file    Years of education: Not on file    Highest education level: Not on file   Occupational History    Not on file   Tobacco Use    Smoking status: Former     Current packs/day: 0.00     Average packs/day: 1 pack/day for 20.0 years (20.0 ttl pk-yrs)     Types: Cigarettes     Start date:      Quit date:      Years since quittin.3    Smokeless tobacco: Never   Vaping Use    Vaping status: Never Used   Substance and Sexual Activity    Alcohol use: Yes     Alcohol/week: 3.0 standard drinks of alcohol     Types: 3 Glasses of  wine per week     Comment: 1/2 glass of wine at a time    Drug use: Never    Sexual activity: Not on file   Other Topics Concern    Not on file   Social History Narrative    Not on file     Social Determinants of Health     Financial Resource Strain: Low Risk  (10/25/2023)    Overall Financial Resource Strain (CARDIA)     Difficulty of Paying Living Expenses: Not very hard   Food Insecurity: Not on file   Transportation Needs: No Transportation Needs (10/25/2023)    PRAPARE - Transportation     Lack of Transportation (Medical): No     Lack of Transportation (Non-Medical): No   Physical Activity: Not on file   Stress: Not on file   Social Connections: Not on file   Intimate Partner Violence: Not on file   Housing Stability: Not on file       Objective     Vitals:    05/01/24 1300   BP: 110/68   BP Location: Left arm   Cuff Size: Standard   Pulse: 59   Temp: 97.8 °F (36.6 °C)   TempSrc: Tympanic   SpO2: 98%   Weight: 74.8 kg (165 lb)     Wt Readings from Last 3 Encounters:   05/01/24 74.8 kg (165 lb)   02/12/24 76.2 kg (168 lb)   01/05/24 75.9 kg (167 lb 6.4 oz)       Physical Exam  Vitals reviewed.   Constitutional:       General: She is not in acute distress.     Appearance: Normal appearance. She is well-developed. She is not ill-appearing.   HENT:      Head: Normocephalic and atraumatic.   Neck:      Thyroid: No thyromegaly.      Vascular: No carotid bruit.   Cardiovascular:      Rate and Rhythm: Normal rate and regular rhythm.      Pulses: Normal pulses.      Heart sounds: Normal heart sounds. No murmur heard.  Pulmonary:      Effort: Pulmonary effort is normal.      Breath sounds: Normal breath sounds. No wheezing, rhonchi or rales.   Musculoskeletal:      Cervical back: Neck supple.      Right lower leg: No edema.      Left lower leg: No edema.   Lymphadenopathy:      Cervical: No cervical adenopathy.   Skin:     General: Skin is warm and dry.   Neurological:      Mental Status: She is alert.   Psychiatric:          Mood and Affect: Mood normal.         Behavior: Behavior normal.         Thought Content: Thought content normal.         Judgment: Judgment normal.         Pertinent Laboratory/Diagnostic Studies:  Lab Results   Component Value Date    BUN 16 04/30/2024    CREATININE 0.87 04/30/2024    CALCIUM 9.3 04/30/2024     01/15/2018    K 4.2 04/30/2024    CO2 29 04/30/2024     04/30/2024     Lab Results   Component Value Date    ALT 12 04/30/2024    AST 18 04/30/2024    ALKPHOS 52 04/30/2024    BILITOT 0.4 01/15/2018       Lab Results   Component Value Date    WBC 5.52 12/12/2023    HGB 13.8 12/12/2023    HCT 42.5 12/12/2023    MCV 94 12/12/2023     12/12/2023     Lab Results   Component Value Date    CHOL 224 (H) 01/15/2018     Lab Results   Component Value Date    TRIG 83 04/30/2024     Lab Results   Component Value Date    HDL 78 04/30/2024     Lab Results   Component Value Date    LDLCALC 96 04/30/2024     Lab Results   Component Value Date    HGBA1C 5.9 (H) 04/30/2024       Results for orders placed or performed in visit on 04/30/24   Lipid Panel with Direct LDL reflex   Result Value Ref Range    Cholesterol 191 See Comment mg/dL    Triglycerides 83 See Comment mg/dL    HDL, Direct 78 >=50 mg/dL    LDL Calculated 96 0 - 100 mg/dL   Comprehensive metabolic panel   Result Value Ref Range    Sodium 141 135 - 147 mmol/L    Potassium 4.2 3.5 - 5.3 mmol/L    Chloride 106 96 - 108 mmol/L    CO2 29 21 - 32 mmol/L    ANION GAP 6 4 - 13 mmol/L    BUN 16 5 - 25 mg/dL    Creatinine 0.87 0.60 - 1.30 mg/dL    Glucose, Fasting 103 (H) 65 - 99 mg/dL    Calcium 9.3 8.4 - 10.2 mg/dL    AST 18 13 - 39 U/L    ALT 12 7 - 52 U/L    Alkaline Phosphatase 52 34 - 104 U/L    Total Protein 7.1 6.4 - 8.4 g/dL    Albumin 3.8 3.5 - 5.0 g/dL    Total Bilirubin 0.36 mg/dL    eGFR 67 ml/min/1.73sq m       Orders Placed This Encounter   Procedures    Mammo screening bilateral w 3d & cad       ALLERGIES:  No Known  Allergies    Current Medications     Current Outpatient Medications   Medication Sig Dispense Refill    butalbital-acetaminophen-caffeine (FIORICET,ESGIC) -40 mg per tablet Take 1 tablet by mouth every 4 (four) hours as needed for headaches 20 tablet 0    dicyclomine (BENTYL) 20 mg tablet TAKE 1 TABLET (20 MG TOTAL) BY MOUTH 3 (THREE) TIMES A DAY AS NEEDED (STOMACH PAIN) 270 tablet 1    escitalopram (LEXAPRO) 20 mg tablet TAKE 1 TABLET BY MOUTH EVERY DAY 90 tablet 1    gabapentin (NEURONTIN) 300 mg capsule Take 1 capsule (300 mg total) by mouth daily at bedtime 90 capsule 0    Multiple Vitamins-Minerals (MULTIVITAL PO) Take 1 tablet by mouth daily      omeprazole (PriLOSEC) 40 MG capsule Take 1 capsule (40 mg total) by mouth daily      propranolol (INDERAL LA) 120 mg 24 hr capsule TAKE 1 CAPSULE BY MOUTH EVERY DAY 90 capsule 1    rosuvastatin (CRESTOR) 40 MG tablet Take 1 tablet (40 mg total) by mouth daily 90 tablet 1    SUMAtriptan (IMITREX) 25 mg tablet TAKE 1 TABLET (25 MG TOTAL) BY MOUTH ONCE AS NEEDED FOR MIGRAINE FOR UP TO 18 DOSES 9 tablet 1     No current facility-administered medications for this visit.         Health Maintenance     Health Maintenance   Topic Date Due    DXA SCAN  Never done    Osteoporosis Screening  Never done    Zoster Vaccine (1 of 2) Never done    COVID-19 Vaccine (6 - 2023-24 season) 09/01/2023    Breast Cancer Screening: Mammogram  10/24/2023    Fall Risk  10/30/2024    Urinary Incontinence Screening  10/30/2024    Medicare Annual Wellness Visit (AWV)  10/30/2024    Depression Screening  05/01/2025    Colorectal Cancer Screening  03/16/2027    Hepatitis C Screening  Completed    Pneumococcal Vaccine: 65+ Years  Completed    Influenza Vaccine  Completed    HIB Vaccine  Aged Out    IPV Vaccine  Aged Out    Hepatitis A Vaccine  Aged Out    Meningococcal ACWY Vaccine  Aged Out    HPV Vaccine  Aged Out     Immunization History   Administered Date(s) Administered    COVID-19 PFIZER  VACCINE 0.3 ML IM 03/10/2021, 04/07/2021, 12/04/2021    COVID-19 Pfizer Vac BIVALENT Jose-sucrose 12 Yr+ IM 11/17/2022    COVID-19 Pfizer vac (Jose-sucrose, gray cap) 12 yr+ IM 06/30/2022    INFLUENZA 01/01/2006    Influenza Quadrivalent Preservative Free 3 years and older IM 10/17/2014, 10/01/2015    Influenza Split High Dose Preservative Free IM 01/09/2018    Influenza, high dose seasonal 0.7 mL 10/23/2019, 10/21/2020, 10/25/2021, 10/28/2022, 10/30/2023    Influenza, seasonal, injectable 10/18/2011, 12/06/2013    Pneumococcal Conjugate 13-Valent 01/09/2018    Pneumococcal Polysaccharide PPV23 03/25/2019    Tdap 01/31/2014, 01/29/2021       Sabra Rueda PA-C  5/8/2024 2:35 AM  [facility]

## 2024-05-02 DIAGNOSIS — G43.701 CHRONIC MIGRAINE WITHOUT AURA WITH STATUS MIGRAINOSUS, NOT INTRACTABLE: ICD-10-CM

## 2024-05-03 RX ORDER — SUMATRIPTAN 25 MG/1
25 TABLET, FILM COATED ORAL ONCE AS NEEDED
Qty: 9 TABLET | Refills: 1 | Status: SHIPPED | OUTPATIENT
Start: 2024-05-03

## 2024-05-08 LAB
ALBUMIN SERPL BCP-MCNC: 3.8 G/DL (ref 3.5–5)
ALP SERPL-CCNC: 52 U/L (ref 34–104)
ALT SERPL W P-5'-P-CCNC: 12 U/L (ref 7–52)
ANION GAP SERPL CALCULATED.3IONS-SCNC: 6 MMOL/L (ref 4–13)
AST SERPL W P-5'-P-CCNC: 18 U/L (ref 13–39)
BILIRUB SERPL-MCNC: 0.36 MG/DL (ref 0.2–1)
BUN SERPL-MCNC: 16 MG/DL (ref 5–25)
CALCIUM SERPL-MCNC: 9.3 MG/DL (ref 8.4–10.2)
CHLORIDE SERPL-SCNC: 106 MMOL/L (ref 96–108)
CO2 SERPL-SCNC: 29 MMOL/L (ref 21–32)
CREAT SERPL-MCNC: 0.87 MG/DL (ref 0.6–1.3)
GFR SERPL CREATININE-BSD FRML MDRD: 67 ML/MIN/1.73SQ M
GLUCOSE P FAST SERPL-MCNC: 103 MG/DL (ref 65–99)
POTASSIUM SERPL-SCNC: 4.2 MMOL/L (ref 3.5–5.3)
PROT SERPL-MCNC: 7.1 G/DL (ref 6.4–8.4)
SODIUM SERPL-SCNC: 141 MMOL/L (ref 135–147)

## 2024-05-08 NOTE — ASSESSMENT & PLAN NOTE
Controlled.  Reports migraines about 3 times per month that respond well to Fioricet and Imitrex.  Continue propranolol 120 mg daily.  Encouraged to see if she responds to Imitrex alone as opposed to combining it with Fioricet.  Encouraged to use Fioricet only if not resolving with Imitrex.

## 2024-05-12 DIAGNOSIS — F41.9 ANXIETY: ICD-10-CM

## 2024-05-13 RX ORDER — ESCITALOPRAM OXALATE 20 MG/1
TABLET ORAL
Qty: 90 TABLET | Refills: 1 | Status: SHIPPED | OUTPATIENT
Start: 2024-05-13

## 2024-05-17 DIAGNOSIS — G43.909 MIGRAINE WITHOUT STATUS MIGRAINOSUS, NOT INTRACTABLE, UNSPECIFIED MIGRAINE TYPE: ICD-10-CM

## 2024-05-20 RX ORDER — BUTALBITAL, ACETAMINOPHEN AND CAFFEINE 50; 325; 40 MG/1; MG/1; MG/1
1 TABLET ORAL EVERY 4 HOURS PRN
Qty: 20 TABLET | Refills: 0 | Status: SHIPPED | OUTPATIENT
Start: 2024-05-20

## 2024-06-11 NOTE — TELEPHONE ENCOUNTER
"Subjective   Patient ID: Link Reyes is a 61 y.o. male who presents today for a follow-up treatment of his chronic left sided body pain including the left jaw, left neck, left shoulder, left hip and left sacrum.    This is visit 10 of the 2024 calendar year. [employer].     Fall risk: None. No falls in the last 6 months.     HPI -all in all he is still doing better regarding the headache and jaw pain and feeling a little bit more \"balanced\".  Unfortunately he did not tolerate the drop table technique well and has had some sacral/tailbone pain since her last treatment encounter.  He does feel that this is improving slowly with time and he has also been taking some over-the-counter Motrin which eliminates the pain completely.  He also continues to have some pain over the left greater than right neck which was mildly exacerbated with cervical traction.  He would like to avoid drop table and traction of the neck today but all other techniques have been extremely beneficial and helpful.    He has his consultation with MACY Sesay on Wednesday.  He is happy to get going on this and to see if he has any additional insights on how to get the firing patterns of his postural muscles more balanced and ideal.    Last treatment 5/22/24: overall he is extremely pleased with the aggressive nature of carb a little more help with cyclic rehabbing from postural reeducation of the left scapula and lower trapezius as well as the left psoas and hip.  He will be referred to a friend and colleague of mine, Dr. Matt Sesay.  In addition to this he will continue to work on releasing the left scapular stabilizing muscles including the subscapularis upper trapezius and middle trap and levator scapula muscles.  He also continues to have tightness of the left sacrum and hip which we will continue to work on mobility.     4/29/24: he continues to notice measurable improvements with respect to his posture and pain.  He has minimal " Upon review of the In Basket request we were able to locate, review, and update the patient chart as requested for Mammogram     Any additional questions or concerns should be emailed to the Practice Liaisons via [email or web address] email, please do not reply via In Basket      Thank you  Siddhartha Perez left neck and jaw pain at this time and is even feeling little more stiffness on the right.  He also feels as if the left scapula is starting to loosen up and he has more freedom of movement to be in a better upright posture when standing and walking.  He has adjusted his rehab protocol to include more small specialized movement patterns to address the scapular stabilizers, the core and the glutes.    4/11/24: he still feels very twisted on the left side specifically in the anterior medial left knee and the psoas area.  He does feel less pain to the jaw and does feel that the last treatment we did into the scapula was beneficial.  He continues to work with his  and his massage therapist to continue to work on flexibility and balancing his posture.  Today we will work in his subscapularis muscle to see if we can release some of the scapular region and we will also check ankle mobility to see if this is contributing to his altered posture and rotation.    4/4/24: Kain reports that the treatment continues to make incremental improvement.  He is finding that he is able to stand more upright which alleviates a lot of tension and weight.  He is being more aware of pulling the shoulder blades down and back which offloads some of the tension he feels in the upper trapezius neck and jaw.  He did have 1 headache but it was temporary since her last treatment encounter, and this was directly related to doing wall squats.  He would like to continue with the same treatment protocol as it has been proving beneficial.  He will continue to work on stabilizing the left scapula.  This is also the same region but a lipoma was removed and there is a large scar at the lower/inferior border of the scapula.  It is unknown how much this will affect his ability to recruit muscle fibers, however we discussed that other muscles around the scar should be able to compensate.  We discussed some other exercises that he can try  such as wall walks to help stabilize the shoulder blades and strengthen the shoulders as well as active and passive hanging from a pull-up bar.  He will try these and report back.    3/29/24: he continues to be extremely happy with his chiropractic care.  He feels as if he is able to settle into a better upright posture and is able to engage his posterior lower scapular muscles better than before.  Mechanically he is feeling a little more balanced.  Today we will continue to work on the left neck and trap, left scapular region as well as the left lower back and hip.  We will also check the quadratus lumborum which would be a reasonable connection between the rib cage and shoulder to the hip and lower back.  He explains that no one has specifically ever worked on this area before.  Otherwise no changes to his medical history.  No new symptoms to report.  He does continue with his Pilates, massage and stretching.    3/4/24: he continues to be quite pleased with his treatments.  Again he would like to focus on the left neck and scapula as well as the left piriformis and glutes.  He continues to have stiffness and soreness of the left foot after his ankle surgery and injury but has been doing a little more self-care at home rolling on a double ball.    2/12/24: he continues to have incremental benefit with each successive chiropractic treatment.  The headaches are significantly less and he feels that he has a greater range of motion in the neck and cervical region overall.  Unfortunately the left sacrum and leg symptoms are also improved, but tend to be very temporary compared to the neck results.  He knows that this has something to do with his prior left ankle injury and gait derangement but we will continue to work on this to see if we can make more lasting benefit.    1/23/24: overall he is still extremely pleased with his treatment and has had significantly less headaches since our last treatment encounter on  "1/10/2024.  However he did purchase the Chirp Wheel and reports that it felt amazing while performing the exercises but feels as if he may have \"overdone it\".  He reports that he did 2 different exercises lasting approximately 5 minutes x 2 and the next morning he had an intense left-sided headache again.  He had not had this in some time and was slightly discouraged.  Upon awakening the next morning the headache did resolve.  Again while doing some of his home exercises including a supported squat with a exercise ball on the wall he felt the same headache again.  He is curious as if there is some issues with tightness and scar tissue along the spine causing these issues.  It is my impression that he may have some adhesions attaching to the dura.  Many of his headaches seem as if they are dural tension like in nature/quality.    We will continue to work with soft tissue release (dry needling when appropriate) instrument assisted soft tissue mobilization and manipulation to free up some of these adhesions and restore better biomechanics.  It is advised that he really try the Chirp Wheel but only use it for about 1 minute/day.  He may have overdone it.  It was explained that we do not want to move things too fast.    1/10/24: Visit #2.  He reports that he is extremely happy with the results of his initial chiropractic treatment.  He has maintained increased range of motion in his neck and feels less stiffness and tightness on the left side of the neck and jaw.  However, he feels as if he needs some work on the right side now as the side feels a little tighter than the left with endrange movements.  Additionally, he would like to focus on the left mid back/intrascapular region which always feels \"stuck\".  He does report an incident when he was running and he slipped and fell landing on his back.  He is curious if this may have been the trigger for his pain.  He has had his massage therapist work on it at length without " "significant relief.  Also, he reports that he felt as if he could walk better after our last treatment but today the left hip and sacrum feel tight again.  Will try to recreate the treatment that was performed to the left hip and sacrum previously.    INITIAL INTAKE/SUBJECTIVE 12/19/23: he did see Dr. Anson De Leon on 11/30/2023 for this same complaint of chronic left lower extremity tightness and hip pain.  There is also some mild left calf atrophy but this is more likely related to a history of tib-fib fracture.  His flatfoot is slightly more pronounced on the left as well.  An MRI has been ordered of the left hip- this has not yet been scheduled/done. Dr. Graves believes that manipulation of the left SI joint may help with his recovery as well as potentially some dry needling to help with myofascial tightness and imbalance.      He explains that his complaints today started approximately 10 years ago.  He did have an issue with a feeling that through his left jaw off and was starting to cause TMJ and trigeminal pain.  This was corrected however \"the damage has been done\".  Since then he has dealt with a lot of tightness in the left jaw, left suboccipital, scalenes, left pec and shoulder.  This is compounded by his left-sided ankle pain which started in the 90s after a left Achilles repair and exacerbated in the early 2000's with his left ankle fracture with hardware.  He recognizes now that the left arch does fall and he tends to pronate the foot, internally rotate the left knee and feels as if he is always twisted to the right.    He does work with a  one-on-one twice a week and is trying desperately to strengthen the left side of his body.  He feels as if it is weak because he is unable to properly engage the muscles.  He has been to the chiropractor once but did not have a true manipulation instead there was some sort of mechanical device that was used.  He did not find any benefit with " this.    Given recent antibiotic, 12/8/2023, due to some sinusitis issues.  No other significant health issues.    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  His wife is on the board for  [employer] babies and children.  He is the father of 3 young adults.    Review of Systems   Constitutional:  Negative for appetite change, fatigue and fever.   HENT:  Negative for congestion and hearing loss.    Eyes:  Negative for visual disturbance.   Respiratory:  Negative for shortness of breath.    Cardiovascular:  Negative for chest pain.   Gastrointestinal:  Negative for nausea and vomiting.        On Uloric for his gout and has not had an attack in a number of years.    Genitourinary:  Negative for dysuria.   Musculoskeletal:          Tib/fib Fx in 2005 playing kickball with kids.   IMAGING (Dr. Graves): CT of cervical spine from 2019 did show some spondylosis/facet arthropathy worst at C5-6 but no significant stenosis.  MRI brachial plexus from 2022 was normal, and I did personally interpret his lumbar MRI from 2021, which shows mild degenerative changes at L4-5 and L5-S1, there is a small disc bulge at L4-5 with subarticular stenosis but this is more prominent to the right side and does not fit with current symptoms.   Skin:  Negative for rash and wound.   Neurological:  Negative for dizziness and weakness.   Psychiatric/Behavioral:  Negative for confusion.    All other systems reviewed and are negative.    Hip Xrays 1/2024: BL mild OA hips    Objective   Observation : normal gait and normal posture. L ankle pronation.     Physical Exam    Examination findings (palpation & ROM): Tenderness, hypertonicity and trigger points palpated throughout the R upper trapezius, R levator scapula, left rhomboids, L middle/lower trap, L intercostals, L thoracic paraspinals, L QL,  left iliolumbar ligament and L hip flexor.  Left heel to buttock was reduced compared to the right with left-sided low back pain  and quad stiffness.  Also trigger points and tenderness with subscapularis palpation in the L axilla.    Segmental joint dysfunction was identified in the following areas using motion palpation and/or pain provocation assessment:  Cervical: C0-C4 (Supine)  Thoracic: T4-T7 (Prone)  Lumbopelvic: L4-L5/S1, L SIJ (gentle prone joint mobs)    Today's treatment:  Performed spinal manipulation to the regions of segmental dysfunction identified on examination using age-appropriate and injury specific force, and manual diversified technique.     Performed seated, supine and prone soft tissue manipulation including seated, supine and prone IASTM (instrument assisted soft tissue mobilization), MFR (Myofacial Release) and IC (ischemic compression) to the hypertonic muscles including L upper trapezius, L levator scapula, left rhomboids, L middle/lower trap, L intercostals, L QL, L thoracic paraspinals, left iliolumbar ligament to patient tolerance.  Pin and stretch technique to the left subscapularis.  Dry needling to the left teres and infraspinatus mm in side-lying (10 in, 10 out).   (Start time 2:50 pm , End Time 3:10 pm). 1 units.     Treatment Plan:   The patient and I discussed the risks and benefits of chiropractic care. Based on the patient's subjective complaints along with the examination findings, it is advised that a course of chiropractic treatment by initiated. Consent for care was given both written and orally by the patient. The patient tolerated today's treatment with little or no additional discomfort and was instructed to contact the office for questions or concerns.     Treatment Frequency: Will see/treat patient every 2-4 weeks as therapeutic benefit is sustained, then frequency will be reduced to as needed for symptom management or as needed for acute flare-ups/exacerbation.     Please note: Voice-to-text software was used when completing this note.  While the note was proofread, portions may include  grammatical errors.  Please contact me with any questions/concerns as it relates to these types of errors.

## 2024-06-12 DIAGNOSIS — R10.10 UPPER ABDOMINAL PAIN: ICD-10-CM

## 2024-06-12 RX ORDER — OMEPRAZOLE 40 MG/1
40 CAPSULE, DELAYED RELEASE ORAL DAILY
Qty: 90 CAPSULE | Refills: 1 | Status: SHIPPED | OUTPATIENT
Start: 2024-06-12

## 2024-06-21 DIAGNOSIS — G89.29 CHRONIC MIDLINE LOW BACK PAIN WITH LEFT-SIDED SCIATICA: ICD-10-CM

## 2024-06-21 DIAGNOSIS — M54.42 CHRONIC MIDLINE LOW BACK PAIN WITH LEFT-SIDED SCIATICA: ICD-10-CM

## 2024-06-21 RX ORDER — GABAPENTIN 300 MG/1
300 CAPSULE ORAL
Qty: 90 CAPSULE | Refills: 0 | Status: SHIPPED | OUTPATIENT
Start: 2024-06-21

## 2024-06-30 DIAGNOSIS — E78.5 HYPERLIPIDEMIA, UNSPECIFIED HYPERLIPIDEMIA TYPE: ICD-10-CM

## 2024-06-30 RX ORDER — ROSUVASTATIN CALCIUM 40 MG/1
40 TABLET, COATED ORAL DAILY
Qty: 90 TABLET | Refills: 1 | Status: SHIPPED | OUTPATIENT
Start: 2024-06-30

## 2024-07-05 DIAGNOSIS — G43.701 CHRONIC MIGRAINE WITHOUT AURA WITH STATUS MIGRAINOSUS, NOT INTRACTABLE: ICD-10-CM

## 2024-07-05 RX ORDER — SUMATRIPTAN 25 MG/1
25 TABLET, FILM COATED ORAL ONCE AS NEEDED
Qty: 9 TABLET | Refills: 1 | Status: SHIPPED | OUTPATIENT
Start: 2024-07-05

## 2024-08-04 DIAGNOSIS — G43.909 MIGRAINE WITHOUT STATUS MIGRAINOSUS, NOT INTRACTABLE, UNSPECIFIED MIGRAINE TYPE: ICD-10-CM

## 2024-08-05 RX ORDER — BUTALBITAL, ACETAMINOPHEN AND CAFFEINE 50; 325; 40 MG/1; MG/1; MG/1
1 TABLET ORAL EVERY 4 HOURS PRN
Qty: 20 TABLET | Refills: 0 | Status: SHIPPED | OUTPATIENT
Start: 2024-08-05

## 2024-08-22 ENCOUNTER — TELEPHONE (OUTPATIENT)
Dept: FAMILY MEDICINE CLINIC | Facility: CLINIC | Age: 74
End: 2024-08-22

## 2024-08-22 NOTE — TELEPHONE ENCOUNTER
JOSEM for pt to call back to reschedule her November 1, 2024 appointment at 2:20 PM with Sabra due to her being out of the office.

## 2024-09-12 ENCOUNTER — OFFICE VISIT (OUTPATIENT)
Dept: FAMILY MEDICINE CLINIC | Facility: CLINIC | Age: 74
End: 2024-09-12
Payer: COMMERCIAL

## 2024-09-12 ENCOUNTER — APPOINTMENT (OUTPATIENT)
Dept: LAB | Facility: MEDICAL CENTER | Age: 74
End: 2024-09-12
Payer: COMMERCIAL

## 2024-09-12 VITALS
BODY MASS INDEX: 23.87 KG/M2 | TEMPERATURE: 97.7 F | DIASTOLIC BLOOD PRESSURE: 70 MMHG | HEART RATE: 64 BPM | OXYGEN SATURATION: 99 % | WEIGHT: 157 LBS | SYSTOLIC BLOOD PRESSURE: 120 MMHG

## 2024-09-12 DIAGNOSIS — N95.1 SWEATS, MENOPAUSAL: ICD-10-CM

## 2024-09-12 DIAGNOSIS — R73.03 PREDIABETES: ICD-10-CM

## 2024-09-12 DIAGNOSIS — R53.83 OTHER FATIGUE: ICD-10-CM

## 2024-09-12 DIAGNOSIS — R53.83 OTHER FATIGUE: Primary | ICD-10-CM

## 2024-09-12 LAB
ALBUMIN SERPL BCG-MCNC: 4.3 G/DL (ref 3.5–5)
ALP SERPL-CCNC: 54 U/L (ref 34–104)
ALT SERPL W P-5'-P-CCNC: 13 U/L (ref 7–52)
ANION GAP SERPL CALCULATED.3IONS-SCNC: 7 MMOL/L (ref 4–13)
AST SERPL W P-5'-P-CCNC: 18 U/L (ref 13–39)
BASOPHILS # BLD AUTO: 0.09 THOUSANDS/ΜL (ref 0–0.1)
BASOPHILS NFR BLD AUTO: 2 % (ref 0–1)
BILIRUB SERPL-MCNC: 0.37 MG/DL (ref 0.2–1)
BUN SERPL-MCNC: 18 MG/DL (ref 5–25)
CALCIUM SERPL-MCNC: 10 MG/DL (ref 8.4–10.2)
CHLORIDE SERPL-SCNC: 104 MMOL/L (ref 96–108)
CO2 SERPL-SCNC: 28 MMOL/L (ref 21–32)
CREAT SERPL-MCNC: 0.9 MG/DL (ref 0.6–1.3)
EOSINOPHIL # BLD AUTO: 0.16 THOUSAND/ΜL (ref 0–0.61)
EOSINOPHIL NFR BLD AUTO: 3 % (ref 0–6)
ERYTHROCYTE [DISTWIDTH] IN BLOOD BY AUTOMATED COUNT: 13.6 % (ref 11.6–15.1)
EST. AVERAGE GLUCOSE BLD GHB EST-MCNC: 117 MG/DL
GFR SERPL CREATININE-BSD FRML MDRD: 64 ML/MIN/1.73SQ M
GLUCOSE P FAST SERPL-MCNC: 95 MG/DL (ref 65–99)
HBA1C MFR BLD: 5.7 %
HCT VFR BLD AUTO: 41.9 % (ref 34.8–46.1)
HGB BLD-MCNC: 13.4 G/DL (ref 11.5–15.4)
IMM GRANULOCYTES # BLD AUTO: 0.02 THOUSAND/UL (ref 0–0.2)
IMM GRANULOCYTES NFR BLD AUTO: 0 % (ref 0–2)
LYMPHOCYTES # BLD AUTO: 1.37 THOUSANDS/ΜL (ref 0.6–4.47)
LYMPHOCYTES NFR BLD AUTO: 25 % (ref 14–44)
MCH RBC QN AUTO: 30 PG (ref 26.8–34.3)
MCHC RBC AUTO-ENTMCNC: 32 G/DL (ref 31.4–37.4)
MCV RBC AUTO: 94 FL (ref 82–98)
MONOCYTES # BLD AUTO: 0.47 THOUSAND/ΜL (ref 0.17–1.22)
MONOCYTES NFR BLD AUTO: 9 % (ref 4–12)
NEUTROPHILS # BLD AUTO: 3.34 THOUSANDS/ΜL (ref 1.85–7.62)
NEUTS SEG NFR BLD AUTO: 61 % (ref 43–75)
NRBC BLD AUTO-RTO: 0 /100 WBCS
PLATELET # BLD AUTO: 174 THOUSANDS/UL (ref 149–390)
PMV BLD AUTO: 13 FL (ref 8.9–12.7)
POTASSIUM SERPL-SCNC: 4.3 MMOL/L (ref 3.5–5.3)
PROCALCITONIN SERPL-MCNC: <0.05 NG/ML
PROT SERPL-MCNC: 7.5 G/DL (ref 6.4–8.4)
RBC # BLD AUTO: 4.46 MILLION/UL (ref 3.81–5.12)
SODIUM SERPL-SCNC: 139 MMOL/L (ref 135–147)
TSH SERPL DL<=0.05 MIU/L-ACNC: 2.08 UIU/ML (ref 0.45–4.5)
WBC # BLD AUTO: 5.45 THOUSAND/UL (ref 4.31–10.16)

## 2024-09-12 PROCEDURE — 84443 ASSAY THYROID STIM HORMONE: CPT

## 2024-09-12 PROCEDURE — 85025 COMPLETE CBC W/AUTO DIFF WBC: CPT

## 2024-09-12 PROCEDURE — 36415 COLL VENOUS BLD VENIPUNCTURE: CPT

## 2024-09-12 PROCEDURE — 83036 HEMOGLOBIN GLYCOSYLATED A1C: CPT

## 2024-09-12 PROCEDURE — 99214 OFFICE O/P EST MOD 30 MIN: CPT | Performed by: INTERNAL MEDICINE

## 2024-09-12 PROCEDURE — 84145 PROCALCITONIN (PCT): CPT

## 2024-09-12 PROCEDURE — 80053 COMPREHEN METABOLIC PANEL: CPT

## 2024-09-12 NOTE — PROGRESS NOTES
Assessment/Plan:    No problem-specific Assessment & Plan notes found for this encounter.       Diagnoses and all orders for this visit:    Other fatigue  -     CBC and differential; Future  -     TSH w/Reflex; Future  -     Procalcitonin; Future  -     Comprehensive metabolic panel; Future    Sweats, menopausal    Prediabetes  -     Hemoglobin A1C; Future          The etiology of her symptoms he is not obvious right now.  Will run some blood work to see if it will shed some light on it.  EKG in the office overall came back to be negative.  Further management after results of her blood work.    Subjective:      Patient ID: You Desir is a 73 y.o. female.    Patient came today with complains of sweats that she has few times a day for few weeks.        The following portions of the patient's history were reviewed and updated as appropriate: allergies, current medications, past family history, past medical history, past social history, past surgical history, and problem list.    Review of Systems   Constitutional:  Negative for chills and fever.   Respiratory:  Negative for cough, shortness of breath and wheezing.    Cardiovascular:  Negative for chest pain, palpitations and leg swelling.   Genitourinary:  Negative for dysuria, flank pain, hematuria, urgency and vaginal pain.   Skin:  Negative for color change.         Objective:      /70 (BP Location: Left arm, Cuff Size: Standard)   Pulse 64   Temp 97.7 °F (36.5 °C) (Tympanic)   Wt 71.2 kg (157 lb)   SpO2 99%   BMI 23.87 kg/m²     No Known Allergies       Current Outpatient Medications:     butalbital-acetaminophen-caffeine (FIORICET,ESGIC) -40 mg per tablet, Take 1 tablet by mouth every 4 (four) hours as needed for headaches, Disp: 20 tablet, Rfl: 0    dicyclomine (BENTYL) 20 mg tablet, TAKE 1 TABLET (20 MG TOTAL) BY MOUTH 3 (THREE) TIMES A DAY AS NEEDED (STOMACH PAIN), Disp: 270 tablet, Rfl: 1    escitalopram (LEXAPRO) 20 mg tablet, TAKE 1  TABLET BY MOUTH EVERY DAY, Disp: 90 tablet, Rfl: 1    gabapentin (NEURONTIN) 300 mg capsule, Take 1 capsule (300 mg total) by mouth daily at bedtime, Disp: 90 capsule, Rfl: 0    Multiple Vitamins-Minerals (MULTIVITAL PO), Take 1 tablet by mouth daily, Disp: , Rfl:     omeprazole (PriLOSEC) 40 MG capsule, Take 1 capsule (40 mg total) by mouth daily, Disp: 90 capsule, Rfl: 1    propranolol (INDERAL LA) 120 mg 24 hr capsule, TAKE 1 CAPSULE BY MOUTH EVERY DAY, Disp: 90 capsule, Rfl: 1    rosuvastatin (CRESTOR) 40 MG tablet, TAKE 1 TABLET BY MOUTH EVERY DAY, Disp: 90 tablet, Rfl: 1    SUMAtriptan (IMITREX) 25 mg tablet, TAKE 1 TABLET (25 MG TOTAL) BY MOUTH ONCE AS NEEDED FOR MIGRAINE FOR UP TO 18 DOSES, Disp: 9 tablet, Rfl: 1     There are no Patient Instructions on file for this visit.        Physical Exam  Constitutional:       General: She is not in acute distress.     Appearance: She is not ill-appearing or toxic-appearing.   Cardiovascular:      Rate and Rhythm: Normal rate.      Heart sounds: No murmur heard.     No gallop.   Pulmonary:      Effort: No respiratory distress.      Breath sounds: No wheezing or rales.   Abdominal:      General: There is no distension.      Tenderness: There is no abdominal tenderness. There is no guarding.

## 2024-09-13 PROBLEM — R53.83 OTHER FATIGUE: Status: ACTIVE | Noted: 2024-09-13

## 2024-09-13 PROBLEM — N95.1 SWEATS, MENOPAUSAL: Status: ACTIVE | Noted: 2024-09-13

## 2024-09-16 PROCEDURE — 93000 ELECTROCARDIOGRAM COMPLETE: CPT | Performed by: INTERNAL MEDICINE

## 2024-09-18 ENCOUNTER — TELEPHONE (OUTPATIENT)
Age: 74
End: 2024-09-18

## 2024-09-18 NOTE — TELEPHONE ENCOUNTER
Patient reports her lab results are finalized and she would like a call back with her PCP's recommendations.

## 2024-09-20 DIAGNOSIS — M54.42 CHRONIC MIDLINE LOW BACK PAIN WITH LEFT-SIDED SCIATICA: ICD-10-CM

## 2024-09-20 DIAGNOSIS — G89.29 CHRONIC MIDLINE LOW BACK PAIN WITH LEFT-SIDED SCIATICA: ICD-10-CM

## 2024-09-20 RX ORDER — GABAPENTIN 300 MG/1
300 CAPSULE ORAL
Qty: 90 CAPSULE | Refills: 0 | Status: SHIPPED | OUTPATIENT
Start: 2024-09-20

## 2024-09-30 DIAGNOSIS — E78.5 HYPERLIPIDEMIA, UNSPECIFIED HYPERLIPIDEMIA TYPE: ICD-10-CM

## 2024-10-01 RX ORDER — ROSUVASTATIN CALCIUM 40 MG/1
40 TABLET, COATED ORAL DAILY
Qty: 90 TABLET | Refills: 1 | Status: SHIPPED | OUTPATIENT
Start: 2024-10-01

## 2024-10-03 DIAGNOSIS — G43.701 CHRONIC MIGRAINE WITHOUT AURA WITH STATUS MIGRAINOSUS, NOT INTRACTABLE: ICD-10-CM

## 2024-10-03 RX ORDER — SUMATRIPTAN 25 MG/1
25 TABLET, FILM COATED ORAL ONCE AS NEEDED
Qty: 9 TABLET | Refills: 0 | Status: SHIPPED | OUTPATIENT
Start: 2024-10-03

## 2024-10-10 DIAGNOSIS — G43.909 MIGRAINE WITHOUT STATUS MIGRAINOSUS, NOT INTRACTABLE, UNSPECIFIED MIGRAINE TYPE: ICD-10-CM

## 2024-10-10 RX ORDER — BUTALBITAL, ACETAMINOPHEN AND CAFFEINE 50; 325; 40 MG/1; MG/1; MG/1
1 TABLET ORAL EVERY 4 HOURS PRN
Qty: 20 TABLET | Refills: 0 | Status: SHIPPED | OUTPATIENT
Start: 2024-10-10

## 2024-10-31 DIAGNOSIS — I10 ESSENTIAL HYPERTENSION: ICD-10-CM

## 2024-10-31 RX ORDER — PROPRANOLOL HYDROCHLORIDE 120 MG/1
120 CAPSULE, EXTENDED RELEASE ORAL DAILY
Qty: 90 CAPSULE | Refills: 1 | Status: SHIPPED | OUTPATIENT
Start: 2024-10-31

## 2024-11-06 ENCOUNTER — IMMUNIZATIONS (OUTPATIENT)
Dept: FAMILY MEDICINE CLINIC | Facility: CLINIC | Age: 74
End: 2024-11-06
Payer: COMMERCIAL

## 2024-11-06 DIAGNOSIS — Z23 ENCOUNTER FOR IMMUNIZATION: Primary | ICD-10-CM

## 2024-11-06 PROCEDURE — 90662 IIV NO PRSV INCREASED AG IM: CPT

## 2024-11-06 PROCEDURE — G0008 ADMIN INFLUENZA VIRUS VAC: HCPCS

## 2024-11-17 DIAGNOSIS — F41.9 ANXIETY: ICD-10-CM

## 2024-11-18 RX ORDER — ESCITALOPRAM OXALATE 20 MG/1
20 TABLET ORAL DAILY
Qty: 90 TABLET | Refills: 0 | Status: SHIPPED | OUTPATIENT
Start: 2024-11-18

## 2024-12-02 ENCOUNTER — OFFICE VISIT (OUTPATIENT)
Dept: FAMILY MEDICINE CLINIC | Facility: CLINIC | Age: 74
End: 2024-12-02
Payer: COMMERCIAL

## 2024-12-02 ENCOUNTER — TELEPHONE (OUTPATIENT)
Age: 74
End: 2024-12-02

## 2024-12-02 VITALS
WEIGHT: 163.8 LBS | HEART RATE: 80 BPM | OXYGEN SATURATION: 97 % | TEMPERATURE: 97 F | SYSTOLIC BLOOD PRESSURE: 106 MMHG | HEIGHT: 68 IN | DIASTOLIC BLOOD PRESSURE: 68 MMHG | BODY MASS INDEX: 24.83 KG/M2

## 2024-12-02 DIAGNOSIS — G43.701 CHRONIC MIGRAINE WITHOUT AURA WITH STATUS MIGRAINOSUS, NOT INTRACTABLE: ICD-10-CM

## 2024-12-02 DIAGNOSIS — Z00.00 MEDICARE ANNUAL WELLNESS VISIT, SUBSEQUENT: Primary | ICD-10-CM

## 2024-12-02 DIAGNOSIS — L98.9 SKIN LESION OF SCALP: ICD-10-CM

## 2024-12-02 DIAGNOSIS — L30.9 DERMATITIS: ICD-10-CM

## 2024-12-02 DIAGNOSIS — Z78.0 ASYMPTOMATIC POSTMENOPAUSAL STATE: ICD-10-CM

## 2024-12-02 DIAGNOSIS — H91.93 BILATERAL HEARING LOSS, UNSPECIFIED HEARING LOSS TYPE: ICD-10-CM

## 2024-12-02 DIAGNOSIS — H93.13 TINNITUS OF BOTH EARS: ICD-10-CM

## 2024-12-02 DIAGNOSIS — G89.29 CHRONIC MIDLINE LOW BACK PAIN WITH LEFT-SIDED SCIATICA: ICD-10-CM

## 2024-12-02 DIAGNOSIS — M54.42 CHRONIC MIDLINE LOW BACK PAIN WITH LEFT-SIDED SCIATICA: ICD-10-CM

## 2024-12-02 DIAGNOSIS — R10.10 UPPER ABDOMINAL PAIN: ICD-10-CM

## 2024-12-02 DIAGNOSIS — G43.909 MIGRAINE WITHOUT STATUS MIGRAINOSUS, NOT INTRACTABLE, UNSPECIFIED MIGRAINE TYPE: ICD-10-CM

## 2024-12-02 PROCEDURE — 99214 OFFICE O/P EST MOD 30 MIN: CPT | Performed by: PHYSICIAN ASSISTANT

## 2024-12-02 PROCEDURE — G0439 PPPS, SUBSEQ VISIT: HCPCS | Performed by: PHYSICIAN ASSISTANT

## 2024-12-02 RX ORDER — GABAPENTIN 300 MG/1
300 CAPSULE ORAL
Qty: 90 CAPSULE | Refills: 1 | Status: SHIPPED | OUTPATIENT
Start: 2024-12-02

## 2024-12-02 RX ORDER — SUMATRIPTAN SUCCINATE 25 MG/1
25 TABLET ORAL ONCE AS NEEDED
Qty: 9 TABLET | Refills: 3 | Status: SHIPPED | OUTPATIENT
Start: 2024-12-02

## 2024-12-02 RX ORDER — TRIAMCINOLONE ACETONIDE 1 MG/G
CREAM TOPICAL 2 TIMES DAILY PRN
Qty: 45 G | Refills: 0 | Status: SHIPPED | OUTPATIENT
Start: 2024-12-02

## 2024-12-02 RX ORDER — BUTALBITAL, ACETAMINOPHEN AND CAFFEINE 50; 325; 40 MG/1; MG/1; MG/1
1 TABLET ORAL EVERY 4 HOURS PRN
Qty: 20 TABLET | Refills: 0 | Status: SHIPPED | OUTPATIENT
Start: 2024-12-02

## 2024-12-02 NOTE — ASSESSMENT & PLAN NOTE
Stable.  Reports that migraines occur weekly but is able to catch them early with her current medication.  Continue propranolol  mg daily as well as Fioricet and Imitrex as needed.  Orders:    SUMAtriptan (IMITREX) 25 mg tablet; Take 1 tablet (25 mg total) by mouth once as needed for migraine

## 2024-12-02 NOTE — ASSESSMENT & PLAN NOTE
Referred to ENT for audiology evaluation.  Orders:    Ambulatory Referral to Otolaryngology; Future

## 2024-12-02 NOTE — PROGRESS NOTES
Name: You Desir      : 1950      MRN: [id number]  Encounter Provider: Sabra Rueda PA-C  Encounter Date: 2024   Encounter department: [facility]    Assessment & Plan  Medicare annual wellness visit, subsequent         Asymptomatic postmenopausal state  Patient has not yet had a DEXA scan.  Encourage patient to get baseline DEXA done.  Orders:    DXA bone density spine hip and pelvis; Future    Skin lesion of scalp  Reviewed my significant concern for this being a large basal cell carcinoma along her right anterior hairline.  Referred to dermatology for further evaluation ASAP.  Orders:    Ambulatory Referral to Dermatology; Future    Tinnitus of both ears  Possibly related to hearing loss that she has been noting.  Follow-up with ENT for audiology evaluation.  Orders:    Ambulatory Referral to Otolaryngology; Future    Bilateral hearing loss, unspecified hearing loss type  Referred to ENT for audiology evaluation.  Orders:    Ambulatory Referral to Otolaryngology; Future    Chronic migraine without aura with status migrainosus, not intractable  Stable.  Reports that migraines occur weekly but is able to catch them early with her current medication.  Continue propranolol  mg daily as well as Fioricet and Imitrex as needed.  Orders:    SUMAtriptan (IMITREX) 25 mg tablet; Take 1 tablet (25 mg total) by mouth once as needed for migraine    Migraine without status migrainosus, not intractable, unspecified migraine type    Orders:    butalbital-acetaminophen-caffeine (FIORICET,ESGIC) -40 mg per tablet; Take 1 tablet by mouth every 4 (four) hours as needed for headaches    Chronic midline low back pain with left-sided sciatica  Controlled.  Continue gabapentin 300 mg at bedtime.  Orders:    gabapentin (NEURONTIN) 300 mg capsule; Take 1 capsule (300 mg total) by mouth daily at bedtime    Upper abdominal pain  Patient has been doing well with her decrease in  omeprazole from 40 mg twice daily down to once daily.  Discussed importance of trying to wean to lowest dose needed to control her symptoms or off if possible.  Patient will trial decrease to 20 mg daily.  Orders:    omeprazole (PriLOSEC) 20 mg delayed release capsule; Take 1 capsule (20 mg total) by mouth daily       Preventive health issues were discussed with patient, and age appropriate screening tests were ordered as noted in patient's After Visit Summary. Personalized health advice and appropriate referrals for health education or preventive services given if needed, as noted in patient's After Visit Summary.    History of Present Illness     Celiac disease - not fully eating gluten free eating    Gastritis - decreased omeprazole to 40 mg daily - doing well     Migraines - on propranolol  mg daily and Fioricet or Imitrex as needed - triggered by smells, etc. - normally about weekly but can usually catch early with meds     Lumbar radiculopathy - on gabapentin 300 mg at bedtime    Hyperlipidemia - on Crestor 40 mg daily now - last LDL was 96 in 4/2024    Anxiety - on Lexapro 20 mg daily - controlled     PreDM - last A1c was 5.7% in 9/2024    Notes that she has trouble with rash and itching on the elbows bilaterally - began months ago -     Has a lesion on the anterior right hairline for over a year        Patient Care Team:  Sabra Rueda PA-C as PCP - General (Family Medicine)  Laurent Dong DO    Review of Systems   Constitutional:  Negative for chills and fever.   HENT:  Negative for congestion, rhinorrhea and sore throat.    Eyes:  Negative for visual disturbance.   Respiratory:  Negative for cough, shortness of breath and wheezing.    Cardiovascular:  Positive for palpitations. Negative for chest pain and leg swelling.   Gastrointestinal:  Negative for abdominal pain, blood in stool, constipation, diarrhea, nausea and vomiting.   Endocrine: Negative for polydipsia and polyuria.   Genitourinary:   Negative for dysuria and frequency.   Musculoskeletal:  Positive for back pain. Negative for arthralgias and myalgias.   Skin:  Positive for rash.   Neurological:  Positive for headaches. Negative for dizziness and syncope.   Hematological:  Does not bruise/bleed easily.   Psychiatric/Behavioral:  Negative for dysphoric mood. The patient is not nervous/anxious.      Medical History Reviewed by provider this encounter:       Annual Wellness Visit Questionnaire   You is here for her Subsequent Wellness visit.     Health Risk Assessment:   Patient rates overall health as fair. Patient feels that their physical health rating is same. Patient is satisfied with their life. Eyesight was rated as same. Hearing was rated as same. Patient feels that their emotional and mental health rating is same. Patients states they are never, rarely angry. Patient states they are sometimes unusually tired/fatigued. Pain experienced in the last 7 days has been some. Patient's pain rating has been 5/10. Patient states that she has experienced no weight loss or gain in last 6 months.     Depression Screening:   PHQ-2 Score: 0  PHQ-9 Score: 0      Fall Risk Screening:   In the past year, patient has experienced: no history of falling in past year      Urinary Incontinence Screening:   Patient has not leaked urine accidently in the last six months.     Home Safety:  Patient does not have trouble with stairs inside or outside of their home. Patient has working smoke alarms and has working carbon monoxide detector. Home safety hazards include: none.     Nutrition:   Current diet is Limited junk food.     Medications:   Patient is not currently taking any over-the-counter supplements. Patient is able to manage medications.     Activities of Daily Living (ADLs)/Instrumental Activities of Daily Living (IADLs):   Walk and transfer into and out of bed and chair?: Yes  Dress and groom yourself?: Yes    Bathe or shower yourself?: Yes    Feed  yourself? Yes  Do your laundry/housekeeping?: Yes  Manage your money, pay your bills and track your expenses?: Yes  Make your own meals?: Yes    Do your own shopping?: Yes    Previous Hospitalizations:   Any hospitalizations or ED visits within the last 12 months?: No      Advance Care Planning:   Living will: Yes    Durable POA for healthcare: Yes    Advanced directive: Yes      Cognitive Screening:   Provider or family/friend/caregiver concerned regarding cognition?: No    PREVENTIVE SCREENINGS      Cardiovascular Screening:    General: Screening Not Indicated and History Lipid Disorder      Diabetes Screening:     General: Screening Current      Colorectal Cancer Screening:     General: Screening Current      Breast Cancer Screening:     General: Screening Current      Cervical Cancer Screening:    General: Screening Not Indicated      Osteoporosis Screening:      Due for: DXA Axial      Abdominal Aortic Aneurysm (AAA) Screening:        General: Screening Not Indicated      Lung Cancer Screening:     General: Screening Not Indicated      Hepatitis C Screening:    General: Screening Current    Screening, Brief Intervention, and Referral to Treatment (SBIRT)    Screening  Typical number of drinks in a day: 1  Typical number of drinks in a week: 3  Interpretation: Low risk drinking behavior.    AUDIT-C Screenin) How often did you have a drink containing alcohol in the past year? 2 to 3 times a week  2) How many drinks did you have on a typical day when you were drinking in the past year? 1 to 2  3) How often did you have 6 or more drinks on one occasion in the past year? never    AUDIT-C Score: 3  Interpretation: Score 3-12 (female): POSITIVE screen for alcohol misuse    AUDIT Screenin) How often during the last year have you found that you were not able to stop drinking once you had started? 0 - never  5) How often during the last year have you failed to do what was normally expected from you because of  drinking? 0 - never  6) How often during the last year have you needed a first drink in the morning to get yourself going after a heavy drinking session? 0 - never  7) How often during the last year have you had a feeling of guilt or remorse after drinking? 0 - never  8) How often during the last year have you been unable to remember what happened the night before because you had been drinking? 0 - never  9) Have you or someone else been injured as a result of your drinking? 0 - no  10) Has a relative or friend or a doctor or another health worker been concerned about your drinking or suggested you cut down? 0 - no    AUDIT Score: 3  Interpretation: Low risk alcohol consumption    Single Item Drug Screening:  How often have you used an illegal drug (including marijuana) or a prescription medication for non-medical reasons in the past year? never    Single Item Drug Screen Score: 0  Interpretation: Negative screen for possible drug use disorder    Review of Current Opioid Use    Opioid Risk Tool (ORT) Interpretation: Complete Opioid Risk Tool (ORT)    Social Drivers of Health     Financial Resource Strain: Low Risk  (10/25/2023)    Overall Financial Resource Strain (CARDIA)     Difficulty of Paying Living Expenses: Not very hard   Food Insecurity: No Food Insecurity (12/1/2024)    Hunger Vital Sign     Worried About Running Out of Food in the Last Year: Never true     Ran Out of Food in the Last Year: Never true   Transportation Needs: No Transportation Needs (12/1/2024)    PRAPARE - Transportation     Lack of Transportation (Medical): No     Lack of Transportation (Non-Medical): No   Housing Stability: Low Risk  (12/1/2024)    Housing Stability Vital Sign     Unable to Pay for Housing in the Last Year: No     Number of Times Moved in the Last Year: 0     Homeless in the Last Year: No   Utilities: Not At Risk (12/1/2024)    [facility] Utilities     Threatened with loss of utilities: No     No results found.    Objective    There were no vitals taken for this visit.    Physical Exam  Vitals reviewed.   Constitutional:       General: She is not in acute distress.     Appearance: Normal appearance. She is well-developed and normal weight. She is not ill-appearing.   HENT:      Head: Normocephalic and atraumatic.      Right Ear: Tympanic membrane, ear canal and external ear normal.      Left Ear: Tympanic membrane, ear canal and external ear normal.      Nose: Nose normal.      Mouth/Throat:      Mouth: Mucous membranes are moist.      Pharynx: No oropharyngeal exudate or posterior oropharyngeal erythema.   Eyes:      Conjunctiva/sclera: Conjunctivae normal.      Pupils: Pupils are equal, round, and reactive to light.   Neck:      Thyroid: No thyromegaly.      Vascular: No carotid bruit.   Cardiovascular:      Rate and Rhythm: Normal rate and regular rhythm.      Pulses: Normal pulses.      Heart sounds: Normal heart sounds. No murmur heard.  Pulmonary:      Effort: Pulmonary effort is normal.      Breath sounds: Normal breath sounds. No wheezing, rhonchi or rales.   Abdominal:      General: Bowel sounds are normal. There is no distension.      Palpations: Abdomen is soft. There is no mass.      Tenderness: There is no abdominal tenderness. There is no guarding.   Musculoskeletal:      Cervical back: Normal range of motion and neck supple.      Right lower leg: No edema.      Left lower leg: No edema.   Lymphadenopathy:      Cervical: No cervical adenopathy.   Skin:     General: Skin is warm and dry.      Findings: Lesion (nodular lesion with overlying telangiectasias on the right anterior hairline 1.3 cm x 1.2 cm) present. No rash.   Neurological:      Mental Status: She is alert.      Sensory: No sensory deficit.      Motor: No weakness.      Comments: 5/5 strength in UE and LE   Psychiatric:         Mood and Affect: Mood normal.         Behavior: Behavior normal.         Thought Content: Thought content normal.         Judgment:  Judgment normal.

## 2024-12-02 NOTE — PATIENT INSTRUCTIONS
Medicare Preventive Visit Patient Instructions  Thank you for completing your Welcome to Medicare Visit or Medicare Annual Wellness Visit today. Your next wellness visit will be due in one year (12/3/2025).  The screening/preventive services that you may require over the next 5-10 years are detailed below. Some tests may not apply to you based off risk factors and/or age. Screening tests ordered at today's visit but not completed yet may show as past due. Also, please note that scanned in results may not display below.  Preventive Screenings:  Service Recommendations Previous Testing/Comments   Colorectal Cancer Screening  * Colonoscopy    * Fecal Occult Blood Test (FOBT)/Fecal Immunochemical Test (FIT)  * Fecal DNA/Cologuard Test  * Flexible Sigmoidoscopy Age: 45-75 years old   Colonoscopy: every 10 years (may be performed more frequently if at higher risk)  OR  FOBT/FIT: every 1 year  OR  Cologuard: every 3 years  OR  Sigmoidoscopy: every 5 years  Screening may be recommended earlier than age 45 if at higher risk for colorectal cancer. Also, an individualized decision between you and your healthcare provider will decide whether screening between the ages of 76-85 would be appropriate. Colonoscopy: 03/16/2022  FOBT/FIT: Not on file  Cologuard: Not on file  Sigmoidoscopy: Not on file    Screening Current     Breast Cancer Screening Age: 40+ years old  Frequency: every 1-2 years  Not required if history of left and right mastectomy Mammogram: 11/04/2024    Screening Current   Cervical Cancer Screening Between the ages of 21-29, pap smear recommended once every 3 years.   Between the ages of 30-65, can perform pap smear with HPV co-testing every 5 years.   Recommendations may differ for women with a history of total hysterectomy, cervical cancer, or abnormal pap smears in past. Pap Smear: Not on file    Screening Not Indicated   Hepatitis C Screening Once for adults born between 1945 and 1965  More frequently in  patients at high risk for Hepatitis C Hep C Antibody: 01/15/2018    Screening Current   Diabetes Screening 1-2 times per year if you're at risk for diabetes or have pre-diabetes Fasting glucose: 95 mg/dL (9/12/2024)  A1C: 5.7 % (9/12/2024)  Screening Current   Cholesterol Screening Once every 5 years if you don't have a lipid disorder. May order more often based on risk factors. Lipid panel: 04/30/2024    Screening Not Indicated  History Lipid Disorder     Other Preventive Screenings Covered by Medicare:  Abdominal Aortic Aneurysm (AAA) Screening: covered once if your at risk. You're considered to be at risk if you have a family history of AAA.  Lung Cancer Screening: covers low dose CT scan once per year if you meet all of the following conditions: (1) Age 55-77; (2) No signs or symptoms of lung cancer; (3) Current smoker or have quit smoking within the last 15 years; (4) You have a tobacco smoking history of at least 20 pack years (packs per day multiplied by number of years you smoked); (5) You get a written order from a healthcare provider.  Glaucoma Screening: covered annually if you're considered high risk: (1) You have diabetes OR (2) Family history of glaucoma OR (3)  aged 50 and older OR (4)  American aged 65 and older  Osteoporosis Screening: covered every 2 years if you meet one of the following conditions: (1) You're estrogen deficient and at risk for osteoporosis based off medical history and other findings; (2) Have a vertebral abnormality; (3) On glucocorticoid therapy for more than 3 months; (4) Have primary hyperparathyroidism; (5) On osteoporosis medications and need to assess response to drug therapy.   Last bone density test (DXA Scan): Not on file.  HIV Screening: covered annually if you're between the age of 15-65. Also covered annually if you are younger than 15 and older than 65 with risk factors for HIV infection. For pregnant patients, it is covered up to 3 times per  pregnancy.    Immunizations:  Immunization Recommendations   Influenza Vaccine Annual influenza vaccination during flu season is recommended for all persons aged >= 6 months who do not have contraindications   Pneumococcal Vaccine   * Pneumococcal conjugate vaccine = PCV13 (Prevnar 13), PCV15 (Vaxneuvance), PCV20 (Prevnar 20)  * Pneumococcal polysaccharide vaccine = PPSV23 (Pneumovax) Adults 19-65 yo with certain risk factors or if 65+ yo  If never received any pneumonia vaccine: recommend Prevnar 20 (PCV20)  Give PCV20 if previously received 1 dose of PCV13 or PPSV23   Hepatitis B Vaccine 3 dose series if at intermediate or high risk (ex: diabetes, end stage renal disease, liver disease)   Respiratory syncytial virus (RSV) Vaccine - COVERED BY MEDICARE PART D  * RSVPreF3 (Arexvy) CDC recommends that adults 60 years of age and older may receive a single dose of RSV vaccine using shared clinical decision-making (SCDM)   Tetanus (Td) Vaccine - COST NOT COVERED BY MEDICARE PART B Following completion of primary series, a booster dose should be given every 10 years to maintain immunity against tetanus. Td may also be given as tetanus wound prophylaxis.   Tdap Vaccine - COST NOT COVERED BY MEDICARE PART B Recommended at least once for all adults. For pregnant patients, recommended with each pregnancy.   Shingles Vaccine (Shingrix) - COST NOT COVERED BY MEDICARE PART B  2 shot series recommended in those 19 years and older who have or will have weakened immune systems or those 50 years and older     Health Maintenance Due:      Topic Date Due   • DXA SCAN  Never done   • Breast Cancer Screening: Mammogram  11/04/2025   • Colorectal Cancer Screening  03/16/2027   • Hepatitis C Screening  Completed     Immunizations Due:  There are no preventive care reminders to display for this patient.  Advance Directives   What are advance directives?  Advance directives are legal documents that state your wishes and plans for medical  care. These plans are made ahead of time in case you lose your ability to make decisions for yourself. Advance directives can apply to any medical decision, such as the treatments you want, and if you want to donate organs.   What are the types of advance directives?  There are many types of advance directives, and each state has rules about how to use them. You may choose a combination of any of the following:  Living will:  This is a written record of the treatment you want. You can also choose which treatments you do not want, which to limit, and which to stop at a certain time. This includes surgery, medicine, IV fluid, and tube feedings.   Durable power of  for healthcare (DPAHC):  This is a written record that states who you want to make healthcare choices for you when you are unable to make them for yourself. This person, called a proxy, is usually a family member or a friend. You may choose more than 1 proxy.  Do not resuscitate (DNR) order:  A DNR order is used in case your heart stops beating or you stop breathing. It is a request not to have certain forms of treatment, such as CPR. A DNR order may be included in other types of advance directives.  Medical directive:  This covers the care that you want if you are in a coma, near death, or unable to make decisions for yourself. You can list the treatments you want for each condition. Treatment may include pain medicine, surgery, blood transfusions, dialysis, IV or tube feedings, and a ventilator (breathing machine).  Values history:  This document has questions about your views, beliefs, and how you feel and think about life. This information can help others choose the care that you would choose.  Why are advance directives important?  An advance directive helps you control your care. Although spoken wishes may be used, it is better to have your wishes written down. Spoken wishes can be misunderstood, or not followed. Treatments may be given even if  "you do not want them. An advance directive may make it easier for your family to make difficult choices about your care.   Alcohol Use and Your Health    Drinking too much can harm your health.  Excessive alcohol use leads to about 88,000 death in the [place] each year, and shortens the life of those who diet by almost 30 years.  Further, excessive drinking cost the economy $249 billion in 2010.  Most excessive drinkers are not alcohol dependent.    Excessive alcohol use has immediate effects that increase the risk of many harmful health conditions.  These are most often the result of binge drinking.  Over time, excessive alcohol use can lead to the development of chronic diseases and other series health problems.    What is considered a \"drink\"?        Excessive alcohol use includes:  Binge Drinking: For women, 4 or more drinks consumed on one occasion. For men, 5 or more drinks consumed on one occasion.  Heavy Drinking: For women, 8 or more drinks per week. For men, 15 or more drinks per week  Any alcohol used by pregnant women  Any alcohol used by those under the age of 21 years    If you choose to drink, do so in moderation:  Do not drink at all if you are under the age of 21, or if you are or may be pregnant, or have health problems that could be made worse by drinking.  For women, up to 1 drink per day  For men, up to 2 drinks a day    No one should begin drinking or drink more frequently based on potential health benefits    Short-Term Health Risks:  Injuries: motor vehicle crashes, falls, drownings, burns  Violence: homicide, suicide, sexual assault, intimate partner violence  Alcohol poisoning  Reproductive health: risky sexual behaviors, unintended prengnacy, sexually transmitted diseases, miscarriage, stillbirth, fetal alcohol syndrome    Long-Term Health Risks:  Chronic diseases: high blood pressure, heart disease, stroke, liver disease, digestive problems  Cancers: breast, mouth and throat, liver, " colon  Learning and memory problems: dementia, poor school performance  Mental health: depression, anxiety, insomnia  Social problems: lost productivity, family problems, unemployment  Alcohol dependence    For support and more information:  Substance Abuse and Mental Health Services Administration  [address]  [place] [address]  Web Address: http://www.[facility].gov    Alcoholics Anonymous        Web Address: http://www.aa.org    https://www.cdc.gov/alcohol/fact-sheets/alcohol-use.htm  Narcotic (Opioid) Safety    Use narcotics safely:  Take prescribed narcotics exactly as directed  Do not give narcotics to others or take narcotics that belong to someone else  Do not mix narcotics without medicines or alcohol  Do not drive or operate heavy machinery after you take the narcotic  Monitor for side effects and notify your healthcare provider if you experienced side effects such as nausea, sleepiness, itching, or trouble thinking clearly.    Manage constipation:    Constipation is the most common side effect of narcotic medicine. Constipation is when you have hard, dry bowel movements, or you go longer than usual between bowel movements. Tell your healthcare provider about all changes in your bowel movements while you are taking narcotics. He or she may recommend laxative medicine to help you have a bowel movement. He or she may also change the kind of narcotic you are taking, or change when you take it. The following are more ways you can prevent or relieve constipation:    Drink liquids as directed.  You may need to drink extra liquids to help soften and move your bowels. Ask how much liquid to drink each day and which liquids are best for you.  Eat high-fiber foods.  This may help decrease constipation by adding bulk to your bowel movements. High-fiber foods include fruits, vegetables, whole-grain breads and cereals, and beans. Your healthcare provider or dietitian can help you create a high-fiber meal plan.  Your provider may also recommend a fiber supplement if you cannot get enough fiber from food.  Exercise regularly.  Regular physical activity can help stimulate your intestines. Walking is a good exercise to prevent or relieve constipation. Ask which exercises are best for you.  Schedule a time each day to have a bowel movement.  This may help train your body to have regular bowel movements. Bend forward while you are on the toilet to help move the bowel movement out. Sit on the toilet for at least 10 minutes, even if you do not have a bowel movement.    Store narcotics safely:   Store narcotics where others cannot easily get them.  Keep them in a locked cabinet or secure area. Do not  keep them in a purse or other bag you carry with you. A person may be looking for something else and find the narcotics.  Make sure narcotics are stored out of the reach of children.  A child can easily overdose on narcotics. Narcotics may look like candy to a small child.    The best way to dispose of narcotics:      The laws vary by country and area. In the [place], the best way is to return the narcotics through a take-back program. This program is offered by the US Drug Enforcement Agency (LAMIN). The following are options for using the program:  Take the narcotics to a LAMIN collection site.  The site is often a law enforcement center. Call your local law enforcement center for scheduled take-back days in your area. You will be given information on where to go if the collection site is in a different location.  Take the narcotics to an approved pharmacy or hospital.  A pharmacy or hospital may be set up as a collection site. You will need to ask if it is a LAMIN collection site if you were not directed there. A pharmacy or doctor's office may not be able to take back narcotics unless it is a LAMIN site.  Use a mail-back system.  This means you are given containers to put the narcotics into. You will then mail them in the  containers.  Use a take-back drop box.  This is a place to leave the narcotics at any time. People and animals will not be able to get into the box. Your local law enforcement agency can tell you where to find a drop box in your area.    Other ways to manage pain:   Ask your healthcare provider about non-narcotic medicines to control pain.  Nonprescription medicines include NSAIDs (such as ibuprofen) and acetaminophen. Prescription medicines include muscle relaxers, antidepressants, and steroids.  Pain may be managed without any medicines.  Some ways to relieve pain include massage, aromatherapy, or meditation. Physical or occupational therapy may also help.    For more information:   Drug Enforcement Administration  [address] [place] [address]  Phone: 9- [phone] - [phone]  Web Address: https://www.deadiversion.[facility].gov/drug_disposal/     Food and Drug Administration  [address] [place] [address]  Phone: 9- 486 - [phone]  Web Address: http://www.fda.gov     © Copyright [employer] 2018 Information is for End User's use only and may not be sold, redistributed or otherwise used for commercial purposes. All illustrations and images included in CareNotes® are the copyrighted property of [employer].D.A[employer]M., Inc. or [employer]

## 2024-12-02 NOTE — TELEPHONE ENCOUNTER
Received call from  Lakeshia from [facility] on behalf of Patient to schedule New Patient appt for Skin lesion of scalp.    Scheduled 7/31/25 at 1:00 pm with Dr. Puente in [place]. Confirmed insurance, provided [place] Address.    Lakeshia asked about wait list. Informed her that they can call back to check for cancellations/sooner appt.     Lakeshia verbalized understanding.

## 2024-12-02 NOTE — ASSESSMENT & PLAN NOTE
Controlled.  Continue gabapentin 300 mg at bedtime.  Orders:    gabapentin (NEURONTIN) 300 mg capsule; Take 1 capsule (300 mg total) by mouth daily at bedtime

## 2024-12-02 NOTE — TELEPHONE ENCOUNTER
Relayed instructions to PT, and highlighted the [facility] phone number on PT's AVS. PT verbalized understanding.

## 2024-12-03 ENCOUNTER — TELEPHONE (OUTPATIENT)
Dept: FAMILY MEDICINE CLINIC | Facility: CLINIC | Age: 74
End: 2024-12-03

## 2024-12-03 ENCOUNTER — TELEPHONE (OUTPATIENT)
Age: 74
End: 2024-12-03

## 2024-12-03 NOTE — TELEPHONE ENCOUNTER
Lina called to cancel patient's appointment in July 2025. They were able to get her into a sooner appointment with another dermatologist.

## 2024-12-03 NOTE — TELEPHONE ENCOUNTER
12/3 9am: LVM to let PT know that the prescription for the triamcinolone (KENALOG) 0.1 % cream she was sent to her [facility] pharmacy, her Dermatology appointment in July was rescheduled to Tuesday, January 7, 2025 at 2:45pm w/[facility], [address], [address], [place] [address]. Phone: [phone], and her July dermatology appt has been cancelled.

## 2024-12-30 ENCOUNTER — TELEPHONE (OUTPATIENT)
Dept: FAMILY MEDICINE CLINIC | Facility: CLINIC | Age: 74
End: 2024-12-30

## 2025-01-25 ENCOUNTER — APPOINTMENT (OUTPATIENT)
Dept: RADIOLOGY | Facility: MEDICAL CENTER | Age: 75
End: 2025-01-25
Payer: COMMERCIAL

## 2025-01-25 ENCOUNTER — OFFICE VISIT (OUTPATIENT)
Dept: URGENT CARE | Facility: MEDICAL CENTER | Age: 75
End: 2025-01-25
Payer: COMMERCIAL

## 2025-01-25 VITALS
SYSTOLIC BLOOD PRESSURE: 127 MMHG | OXYGEN SATURATION: 96 % | RESPIRATION RATE: 18 BRPM | HEART RATE: 69 BPM | TEMPERATURE: 97.6 F | DIASTOLIC BLOOD PRESSURE: 58 MMHG

## 2025-01-25 DIAGNOSIS — M25.511 ACUTE PAIN OF RIGHT SHOULDER: ICD-10-CM

## 2025-01-25 DIAGNOSIS — S43.401A SPRAIN OF RIGHT SHOULDER, UNSPECIFIED SHOULDER SPRAIN TYPE, INITIAL ENCOUNTER: Primary | ICD-10-CM

## 2025-01-25 PROCEDURE — S9083 URGENT CARE CENTER GLOBAL: HCPCS | Performed by: PHYSICIAN ASSISTANT

## 2025-01-25 PROCEDURE — 73060 X-RAY EXAM OF HUMERUS: CPT

## 2025-01-25 PROCEDURE — 73030 X-RAY EXAM OF SHOULDER: CPT

## 2025-01-25 PROCEDURE — 99213 OFFICE O/P EST LOW 20 MIN: CPT | Performed by: PHYSICIAN ASSISTANT

## 2025-01-25 RX ORDER — PREDNISONE 10 MG/1
TABLET ORAL
Qty: 20 TABLET | Refills: 0 | Status: SHIPPED | OUTPATIENT
Start: 2025-01-25

## 2025-01-25 NOTE — PROGRESS NOTES
[facility]        NAME: You Desir is a 74 y.o. female  : 1950    MRN: [id number]  DATE: 2025  TIME: 4:22 PM    /58   Pulse 69   Temp 97.6 °F (36.4 °C)   Resp 18   SpO2 96%     Assessment and Plan   Sprain of right shoulder, unspecified shoulder sprain type, initial encounter [S43.401A]  1. Sprain of right shoulder, unspecified shoulder sprain type, initial encounter  XR shoulder 2+ vw right    XR humerus right    Sling    Ambulatory referral to Orthopedic Surgery    predniSONE 10 mg tablet            Patient Instructions       Follow up with PCP in 3-5 days.  Proceed to  ER if symptoms worsen.    Chief Complaint     Chief Complaint   Patient presents with    Shoulder Pain     Patient c/o right shoulder pain for the last month. She noted the pain worsen in the last 24 hrs           History of Present Illness       Pt with right shoulder and upper arm pain x 1 month, pt with increased pain of last 2-3 days no known injury no prior right shoulder pain     Shoulder Pain         Review of Systems   Review of Systems   Constitutional: Negative.    HENT: Negative.     Eyes: Negative.    Respiratory: Negative.     Cardiovascular: Negative.    Gastrointestinal: Negative.    Endocrine: Negative.    Genitourinary: Negative.    Musculoskeletal: Negative.    Skin: Negative.    Allergic/Immunologic: Negative.    Neurological: Negative.    Hematological: Negative.    Psychiatric/Behavioral: Negative.     All other systems reviewed and are negative.        Current Medications       Current Outpatient Medications:     predniSONE 10 mg tablet, 4 tabs po qd x 2 days then 3 tabs po qd x 2 days then 2 tabs po qd x 2 days then 1 tab po qd x 2 days, Disp: 20 tablet, Rfl: 0    butalbital-acetaminophen-caffeine (FIORICET,ESGIC) -40 mg per tablet, Take 1 tablet by mouth every 4 (four) hours as needed for headaches, Disp: 20 tablet, Rfl: 0    escitalopram (LEXAPRO) 20 mg tablet, TAKE 1  TABLET BY MOUTH EVERY DAY, Disp: 90 tablet, Rfl: 0    gabapentin (NEURONTIN) 300 mg capsule, Take 1 capsule (300 mg total) by mouth daily at bedtime, Disp: 90 capsule, Rfl: 1    Multiple Vitamins-Minerals (MULTIVITAL PO), Take 1 tablet by mouth daily, Disp: , Rfl:     omeprazole (PriLOSEC) 20 mg delayed release capsule, Take 1 capsule (20 mg total) by mouth daily, Disp: 90 capsule, Rfl: 1    propranolol (INDERAL LA) 120 mg 24 hr capsule, TAKE 1 CAPSULE BY MOUTH EVERY DAY, Disp: 90 capsule, Rfl: 1    rosuvastatin (CRESTOR) 40 MG tablet, TAKE 1 TABLET BY MOUTH EVERY DAY, Disp: 90 tablet, Rfl: 1    SUMAtriptan (IMITREX) 25 mg tablet, Take 1 tablet (25 mg total) by mouth once as needed for migraine, Disp: 9 tablet, Rfl: 3    triamcinolone (KENALOG) 0.1 % cream, Apply topically 2 (two) times a day as needed for rash Do not use for over 2 weeks at a time., Disp: 45 g, Rfl: 0    Current Allergies     Allergies as of 01/25/2025    (No Known Allergies)            The following portions of the patient's history were reviewed and updated as appropriate: allergies, current medications, past family history, past medical history, past social history, past surgical history and problem list.     Past Medical History:   Diagnosis Date    SHAY (acute kidney injury) ([facility]ly [facility]) 2/3/2021    Anxiety 2018    Arthritis 2008    Back pain     Depression     GERD (gastroesophageal reflux disease) 2020    Headache(784.0) 1978    Hyperlipidemia        Past Surgical History:   Procedure Laterality Date    APPENDECTOMY      COLONOSCOPY      HYSTERECTOMY         Family History   Problem Relation Age of Onset    Heart failure Mother     Arthritis Mother     Coronary artery disease Mother     Lung cancer Father     Cancer Father     Lupus Daughter     No Known Problems Maternal Grandmother     No Known Problems Maternal Grandfather     Cancer Paternal Grandfather         unknown type    Multiple sclerosis Brother     Bipolar disorder Son     Asthma Son      Breast cancer Maternal Aunt     No Known Problems Maternal Aunt     No Known Problems Paternal Aunt     No Known Problems Paternal Aunt     No Known Problems Paternal Aunt          Medications have been verified.        Objective   /58   Pulse 69   Temp 97.6 °F (36.4 °C)   Resp 18   SpO2 96%        Physical Exam     Physical Exam  Vitals and nursing note reviewed.   Constitutional:       Appearance: Normal appearance. She is normal weight.   HENT:      Head: Normocephalic and atraumatic.   Cardiovascular:      Rate and Rhythm: Normal rate and regular rhythm.   Pulmonary:      Effort: Pulmonary effort is normal.      Breath sounds: Normal breath sounds.   Abdominal:      Palpations: Abdomen is soft.   Musculoskeletal:      Cervical back: Normal range of motion and neck supple.      Comments: Right shoulder from with pain humeral head and mid humerus  no swelling no erythema  distal neuro and vascular wnl hand grasp wnl      Neurological:      Mental Status: She is alert.

## 2025-01-30 ENCOUNTER — OFFICE VISIT (OUTPATIENT)
Dept: OBGYN CLINIC | Facility: MEDICAL CENTER | Age: 75
End: 2025-01-30
Payer: COMMERCIAL

## 2025-01-30 VITALS — WEIGHT: 164 LBS | BODY MASS INDEX: 24.86 KG/M2 | HEIGHT: 68 IN

## 2025-01-30 DIAGNOSIS — M25.511 ACUTE PAIN OF RIGHT SHOULDER: Primary | ICD-10-CM

## 2025-01-30 DIAGNOSIS — M75.81 TENDINITIS OF RIGHT ROTATOR CUFF: ICD-10-CM

## 2025-01-30 PROCEDURE — 99203 OFFICE O/P NEW LOW 30 MIN: CPT | Performed by: EMERGENCY MEDICINE

## 2025-01-30 PROCEDURE — 20610 DRAIN/INJ JOINT/BURSA W/O US: CPT | Performed by: EMERGENCY MEDICINE

## 2025-01-30 RX ORDER — ROPIVACAINE HYDROCHLORIDE 2 MG/ML
4 INJECTION, SOLUTION EPIDURAL; INFILTRATION; PERINEURAL
Status: COMPLETED | OUTPATIENT
Start: 2025-01-30 | End: 2025-01-30

## 2025-01-30 RX ORDER — TRIAMCINOLONE ACETONIDE 40 MG/ML
40 INJECTION, SUSPENSION INTRA-ARTICULAR; INTRAMUSCULAR
Status: COMPLETED | OUTPATIENT
Start: 2025-01-30 | End: 2025-01-30

## 2025-01-30 RX ADMIN — TRIAMCINOLONE ACETONIDE 40 MG: 40 INJECTION, SUSPENSION INTRA-ARTICULAR; INTRAMUSCULAR at 09:00

## 2025-01-30 RX ADMIN — ROPIVACAINE HYDROCHLORIDE 4 ML: 2 INJECTION, SOLUTION EPIDURAL; INFILTRATION; PERINEURAL at 09:00

## 2025-01-30 NOTE — PATIENT INSTRUCTIONS
While taking the oral steroid Prednisone, do not take any NSAIDs such as Advil, Motrin, ibuprofen, Motrin, Aleve or naproxen.  You can restart the NSAIDs after you finish the steroids.    However you may take Tylenol with these medications    While taking oral steroids, you may experience mild side effects such as feeling jittery or flushing.  Please call if your side effects are significant or you have any questions.      THEN    You may use Advil (ibuprofen) 400-600mg every 6 hours or at least twice per day (OR Aleve (naproxen) 250-500mg every 12 hours as needed for pain and inflammation).  However do not mix or take other NSAIDs together such as Advil, Motrin, ibuprofen, Celebrex, naproxen, diclofenac or Aleve.    You may also take Tylenol (acetaminophen) together with Advil (ibuprofen) or Aleve (naproxen) as this is safe and can help decrease your pain levels.  The dosing for Tylenol is 500mg every 4-6 hours as needed OR max 1,000mg per dose up to 3 times per day for a total of 3,000mg per day  *Check with your primary care physician to see if these medications are safe to take and to make sure they do not interfere with your other medications and medical issues.          Patient Education     Rotator Cuff Tear Exercises   About this topic   A rotator cuff tear is a problem that can limit how much you can move your shoulder. It can also be painful. The rotator cuff is a group of muscles and tendons in your shoulder. It helps your shoulder move and be steady. These muscles help to rotate and lift the arm. Tendons are strong bands that connect muscles to bones. You can tear a tendon in your shoulder if you fall or move your shoulder too fast and with too much force. Your tendon can also wear out over time and tear. Large tears may require surgery. For small tears, exercises may help make this problem better.  General   Before starting with a program, ask your doctor if you are healthy enough to do these exercises.  Your doctor may have you work with a  or physical therapist to make a safe exercise program to meet your needs. You should not do the exercises if they cause sharp pains. You may need to start out with easy exercises at first. Then, once your shoulder is feeling better, you may start the harder exercises.  Passive Exercises:   You use the movement of your body to move your arm. Do NOT use your shoulder muscles to move your arm.  Pendulum exercises ? Hold onto a table with one hand and bend forward at the waist until your back is level with the table. Let your sore arm hang in front of you. Do each exercise for 1 minute.  Circles ? Move your body in large circles one way. After you move a few times, your arm should start gently moving in circles. Now, move your body in circles the other way until your arm moves the other way.  Swinging side-to-side ? Move your body side to side. After you move a few times, your arm should start gently moving side-to-side.  Swinging back and forth ? Move your body forwards and then backwards. After you move a few times, your arm should start gently moving back and forth.  Stretching Exercises   Stretching exercises keep your muscles flexible. They also stop them from getting tight. Start by doing each of these stretches 2 to 3 times. In order for your body to make changes, you will need to hold these stretches for 20 to 30 seconds. Try to do the stretches 2 to 3 times each day. Do all exercises slowly.  Strengthening Exercises   Strengthening exercises keep your muscles firm and strong. Start by repeating each exercise 2 to 3 times. Work up to doing each exercise 10 times. Try to do the exercises 2 to 3 times each day. Do all exercises slowly.  Shoulder blade squeezes ? Pinch your shoulder blades together on your upper back and hold 3 to 5 seconds. Relax.  Cane rehab exercises:  Overhead flexions ? Lie down and grab the cane with both hands. Start with your arms straight and  the cane resting on your upper legs. Keep your arms straight and lift the cane over your head.  Abductions or side-to-side motion ? Stand and grab the cane with both hands. Turn your thumbs to the left to stretch your left shoulder. Start with your arms straight and the cane resting on your upper legs. Push the cane to the left, raising your left arm. Keep your left elbow straight. Keep pushing until you feel a good stretch. Switch the position of your hands to point your thumbs to the right and push the cane right to stretch your right shoulder.  External rotations or side-to-side rotations ? Lie down and grab the cane with both hands. Start with your elbows bent and touching your body. Put the tip of the cane in the palm of your right hand to stretch your right shoulder. Grab the cane at the other end with your left hand. Push the cane sideways into your right palm until you feel a stretch in your shoulder. Be sure to keep your right elbow close to your body while you are stretching. Switch hands to stretch the left shoulder.  Internal rotations ? Stand up and grab the cane with both hands behind your back. With your palms facing backwards, slide the cane up your back. Go until you feel a good stretch in front of the shoulder.  Shoulder blade exercises ? Lie on your stomach near the edge of a mat or bed or supported on an exercise ball. Start with your arms hanging down in a relaxed position.  Y position ? Raise your arms up and to the sides so your elbows are near your ears and your arms are straight out diagonally like the letter Y. Lower the arms back to the starting position.  T position ? Raise your arms straight out to the sides, even with your shoulders. Lower the arms back to the starting position.  W position ? Raise your arms up and to the sides with your elbows bent. Your hands should be just above your shoulders and looks like a W from above. Lower the arms back to the starting position.  L position ?  "Keep your elbows at your sides and raise just your lower arms out to the side to make a letter L and a backwards letter L. Lower the arms back to the starting position.  Shoulder exercises ? Tie a knot in an exercise band. Secure the band in a door by shutting it in around waist level. Wrap the band around one hand for a good . Stand away from the door enough to have slight tension in the band. As the exercises get easier, you may need to change to a heavier band. Moving closer to, or farther away from, the point where the band is tied down will decrease or increase the tension in the band.  Internal rotations ? Face sideways with the arm holding the band closest to the door. Bend the elbow to 90 degrees or in an \"L\" position. Keeping your elbow by your side and bent, pull the band across your body. Bring it back to the starting position. Repeat with the other arm.  External rotations ? Face sideways with the arm holding the band farthest from the door. Bend the elbow to 90 degrees or in an \"L\" position. Keeping your elbow by your side and bent, pull the band away from your body. Bring it back to the starting position. Repeat with the other arm.  Abductions ? Face sideways with the arm holding the band farthest from the door. Be sure that your thumb is facing upward. Keep your elbow straight and pull the band out to the side and away from your body. Go up to shoulder level. Bring it back to the starting position. Repeat with the other arm.  Flexions ? Face away from the door. Keeping your elbow straight, pull the band straight out in front of you. Bring it back to the starting position. Repeat with the other arm.               What will the results be?   Less pain  More strength  Able to move your arm more  Easier to do daily activities  Shoulder is more stable  Prevent re-injury  Helpful tips   Stay active and work out to keep your muscles strong and flexible.  Eat a healthy diet to keep your muscles " healthy.  Be sure you do not hold your breath when exercising. This can raise your blood pressure. If you tend to hold your breath, try counting out loud when exercising. If any exercise bothers you, stop right away.  Always warm up before stretching. Heated muscles stretch much easier than cool muscles. Stretching cool muscles can lead to injury.  Try swinging your arms at an easy pace for a few minutes to warm up your muscles. Do this again after exercising.  Never bounce when doing stretches.  After exercising, it is a good idea to use ice. Place an ice pack or a bag of frozen peas wrapped in a towel over the painful part. Never put ice right on the skin. Do not leave the ice on more than 10 to 15 minutes at a time. Ice after activity may help decrease pain and swelling. Never ice before stretching.  Doing exercises before a meal may be a good way to get into a routine.  Exercise may be slightly uncomfortable, but you should not have sharp pains. If you do get sharp pains, stop what you are doing. If the sharp pains continue, call your doctor.  Last Reviewed Date   2020-03-10  Consumer Information Use and Disclaimer   This generalized information is a limited summary of diagnosis, treatment, and/or medication information. It is not meant to be comprehensive and should be used as a tool to help the user understand and/or assess potential diagnostic and treatment options. It does NOT include all information about conditions, treatments, medications, side effects, or risks that may apply to a specific patient. It is not intended to be medical advice or a substitute for the medical advice, diagnosis, or treatment of a health care provider based on the health care provider's examination and assessment of a patient’s specific and unique circumstances. Patients must speak with a health care provider for complete information about their health, medical questions, and treatment options, including any risks or benefits  regarding use of medications. This information does not endorse any treatments or medications as safe, effective, or approved for treating a specific patient. [employer] and its affiliates disclaim any warranty or liability relating to this information or the use thereof. The use of this information is governed by the Terms of Use, available at https://www.[employer].com/en/know/clinical-effectiveness-terms   Copyright   Copyright © 2024 [employer] and its affiliates and/or licensors. All rights reserved.

## 2025-01-30 NOTE — PROGRESS NOTES
Assessment/Plan:    Diagnoses and all orders for this visit:    Acute pain of right shoulder  -     Large joint arthrocentesis: R subacromial bursa    Tendinitis of right rotator cuff  -     Ambulatory referral to Orthopedic Surgery  -     Large joint arthrocentesis: R subacromial bursa    Acute right shoulder pain consistent with rotator cuff tendinitis.  X-rays of the shoulder and humerus are within normal limits.  I have provided her home exercises to perform for the shoulder.  To consider formal physical therapy.    Return in about 3 months (around 4/30/2025).      Subjective:   Patient ID: You Desir is a 74 y.o. female.    NP presents for about a month of worsening Right lateral shoulder for which she completed a course of oral steroids.  Also taking Tylenol and Advil.          Review of Systems    The following portions of the patient's chart were reviewed and updated as appropriate:   Allergy:  No Known Allergies    Medications:    Current Outpatient Medications:     butalbital-acetaminophen-caffeine (FIORICET,ESGIC) -40 mg per tablet, Take 1 tablet by mouth every 4 (four) hours as needed for headaches, Disp: 20 tablet, Rfl: 0    escitalopram (LEXAPRO) 20 mg tablet, TAKE 1 TABLET BY MOUTH EVERY DAY, Disp: 90 tablet, Rfl: 0    gabapentin (NEURONTIN) 300 mg capsule, Take 1 capsule (300 mg total) by mouth daily at bedtime, Disp: 90 capsule, Rfl: 1    Multiple Vitamins-Minerals (MULTIVITAL PO), Take 1 tablet by mouth daily, Disp: , Rfl:     omeprazole (PriLOSEC) 20 mg delayed release capsule, Take 1 capsule (20 mg total) by mouth daily, Disp: 90 capsule, Rfl: 1    predniSONE 10 mg tablet, 4 tabs po qd x 2 days then 3 tabs po qd x 2 days then 2 tabs po qd x 2 days then 1 tab po qd x 2 days, Disp: 20 tablet, Rfl: 0    propranolol (INDERAL LA) 120 mg 24 hr capsule, TAKE 1 CAPSULE BY MOUTH EVERY DAY, Disp: 90 capsule, Rfl: 1    rosuvastatin (CRESTOR) 40 MG tablet, TAKE 1 TABLET BY MOUTH EVERY DAY,  "Disp: 90 tablet, Rfl: 1    SUMAtriptan (IMITREX) 25 mg tablet, Take 1 tablet (25 mg total) by mouth once as needed for migraine, Disp: 9 tablet, Rfl: 3    triamcinolone (KENALOG) 0.1 % cream, Apply topically 2 (two) times a day as needed for rash Do not use for over 2 weeks at a time., Disp: 45 g, Rfl: 0    Patient Active Problem List   Diagnosis    Abnormal EKG    Benign neoplasm of colon    Breast tenderness in female    Neck pain    Hearing loss    Displacement of thoracic intervertebral disc without myelopathy    Hyperlipidemia    Irritable bowel syndrome    Levoscoliosis    Lightheadedness    Chronic low back pain    Memory difficulties    Migraine    Onychomycosis    Osteoarthritis of both hands    Palpitations    Skin lesions    Anxiety    Gastritis    Overweight (BMI 25.0-29.9)    Lumbar radiculopathy    Spinal stenosis of lumbar region    Diarrhea    Prediabetes    Celiac disease    Sweats, menopausal    Other fatigue    Acute pain of right shoulder       Objective:  Ht 5' 8\" (1.727 m)   Wt 74.4 kg (164 lb)   BMI 24.94 kg/m²     Right Shoulder Exam     Range of Motion   Active abduction:  abnormal   Internal rotation 0 degrees:  abnormal     Tests   Drop arm: negative    Other   Erythema: absent            Physical Exam      Neurologic Exam    Large joint arthrocentesis: R subacromial bursa  Universal Protocol:  Consent: Verbal consent obtained.  Risks and benefits: risks, benefits and alternatives were discussed  Consent given by: patient  Time out: Immediately prior to procedure a \"time out\" was called to verify the correct patient, procedure, equipment, support staff and site/side marked as required.  Timeout called at: 1/30/2025 9:21 AM.  Patient understanding: patient states understanding of the procedure being performed  Test results: test results available and properly labeled  Site marked: the operative site was marked  Patient identity confirmed: verbally with patient  Supporting " Documentation  Indications: pain   Procedure Details  Location: shoulder - R subacromial bursa  Preparation: Patient was prepped and draped in the usual sterile fashion  Needle size: 22 G  Ultrasound guidance: no  Approach: posterolateral  Medications administered: 40 mg triamcinolone acetonide 40 mg/mL; 4 mL ropivacaine 0.2 %    Patient tolerance: patient tolerated the procedure well with no immediate complications  Dressing:  Sterile dressing applied    No erythema of Shoulder(s)        I have personally reviewed pertinent films in PACS   Xrays Right Shoulder and Humerus            Past Medical History:   Diagnosis Date    SHAY (acute kidney injury) ([facility]) 2/3/2021    Anxiety 2018    Arthritis 2008    Back pain     Depression     GERD (gastroesophageal reflux disease) 2020    Headache(784.0) 1978    Hyperlipidemia        Past Surgical History:   Procedure Laterality Date    APPENDECTOMY      COLONOSCOPY      HYSTERECTOMY         Social History     Socioeconomic History    Marital status: /Civil Union     Spouse name: Not on file    Number of children: Not on file    Years of education: Not on file    Highest education level: Not on file   Occupational History    Not on file   Tobacco Use    Smoking status: Former     Current packs/day: 0.00     Average packs/day: 1 pack/day for 20.0 years (20.0 ttl pk-yrs)     Types: Cigarettes     Start date:      Quit date:      Years since quittin.0    Smokeless tobacco: Never   Vaping Use    Vaping status: Never Used   Substance and Sexual Activity    Alcohol use: Yes     Alcohol/week: 3.0 standard drinks of alcohol     Types: 3 Glasses of wine per week     Comment: 1/2 glass of wine at a time    Drug use: Never    Sexual activity: Not on file   Other Topics Concern    Not on file   Social History Narrative    Not on file     Social Drivers of Health     Financial Resource Strain: Low Risk  (10/25/2023)    Overall Financial Resource Strain ([facility])      Difficulty of Paying Living Expenses: Not very hard   Food Insecurity: No Food Insecurity (12/1/2024)    Hunger Vital Sign     Worried About Running Out of Food in the Last Year: Never true     Ran Out of Food in the Last Year: Never true   Transportation Needs: No Transportation Needs (12/1/2024)    PRAPARE - Transportation     Lack of Transportation (Medical): No     Lack of Transportation (Non-Medical): No   Physical Activity: Not on file   Stress: Not on file   Social Connections: Not on file   Intimate Partner Violence: Not on file   Housing Stability: Low Risk  (12/1/2024)    Housing Stability Vital Sign     Unable to Pay for Housing in the Last Year: No     Number of Times Moved in the Last Year: 0     Homeless in the Last Year: No       Family History   Problem Relation Age of Onset    Heart failure Mother     Arthritis Mother     Coronary artery disease Mother     Lung cancer Father     Cancer Father     Lupus Daughter     No Known Problems Maternal Grandmother     No Known Problems Maternal Grandfather     Cancer Paternal Grandfather         unknown type    Multiple sclerosis Brother     Bipolar disorder Son     Asthma Son     Breast cancer Maternal Aunt     No Known Problems Maternal Aunt     No Known Problems Paternal Aunt     No Known Problems Paternal Aunt     No Known Problems Paternal Aunt

## 2025-02-03 DIAGNOSIS — G43.909 MIGRAINE WITHOUT STATUS MIGRAINOSUS, NOT INTRACTABLE, UNSPECIFIED MIGRAINE TYPE: ICD-10-CM

## 2025-02-04 RX ORDER — BUTALBITAL, ACETAMINOPHEN AND CAFFEINE 50; 325; 40 MG/1; MG/1; MG/1
1 TABLET ORAL EVERY 4 HOURS PRN
Qty: 20 TABLET | Refills: 0 | Status: SHIPPED | OUTPATIENT
Start: 2025-02-04

## 2025-02-05 DIAGNOSIS — I10 ESSENTIAL HYPERTENSION: ICD-10-CM

## 2025-02-06 RX ORDER — PROPRANOLOL HYDROCHLORIDE 120 MG/1
120 CAPSULE, EXTENDED RELEASE ORAL DAILY
Qty: 90 CAPSULE | Refills: 1 | Status: SHIPPED | OUTPATIENT
Start: 2025-02-06

## 2025-02-14 DIAGNOSIS — F41.9 ANXIETY: ICD-10-CM

## 2025-02-14 RX ORDER — ESCITALOPRAM OXALATE 20 MG/1
20 TABLET ORAL DAILY
Qty: 90 TABLET | Refills: 0 | Status: SHIPPED | OUTPATIENT
Start: 2025-02-14

## 2025-03-17 DIAGNOSIS — G89.29 CHRONIC MIDLINE LOW BACK PAIN WITH LEFT-SIDED SCIATICA: ICD-10-CM

## 2025-03-17 DIAGNOSIS — M54.42 CHRONIC MIDLINE LOW BACK PAIN WITH LEFT-SIDED SCIATICA: ICD-10-CM

## 2025-03-18 RX ORDER — GABAPENTIN 300 MG/1
300 CAPSULE ORAL
Qty: 90 CAPSULE | Refills: 0 | OUTPATIENT
Start: 2025-03-18

## 2025-03-24 ENCOUNTER — OFFICE VISIT (OUTPATIENT)
Dept: FAMILY MEDICINE CLINIC | Facility: CLINIC | Age: 75
End: 2025-03-24
Payer: COMMERCIAL

## 2025-03-24 VITALS
DIASTOLIC BLOOD PRESSURE: 78 MMHG | SYSTOLIC BLOOD PRESSURE: 124 MMHG | HEART RATE: 66 BPM | BODY MASS INDEX: 24.94 KG/M2 | TEMPERATURE: 97.5 F | WEIGHT: 164 LBS | OXYGEN SATURATION: 98 %

## 2025-03-24 DIAGNOSIS — R51.9 NEW ONSET OF HEADACHES: Primary | ICD-10-CM

## 2025-03-24 PROCEDURE — 99214 OFFICE O/P EST MOD 30 MIN: CPT | Performed by: INTERNAL MEDICINE

## 2025-03-24 PROCEDURE — G2211 COMPLEX E/M VISIT ADD ON: HCPCS | Performed by: INTERNAL MEDICINE

## 2025-03-24 NOTE — PROGRESS NOTES
Name: You Desir      : 1950      MRN: [id number]  Encounter Provider: Naseem Franco MD  Encounter Date: 3/24/2025   Encounter department: [facility]    Assessment & Plan  New onset of headaches  New onset headaches.  Will do a CT scan of the head to exclude any gross acute intracranial process.  If no improvement in few weeks he will follow-up with us in the office.    Orders:    CT head wo contrast; Future         History of Present Illness     Patient came today with complains of worsening headaches that started few weeks ago.  She reports that she does have a history of migraines but this type of pain that she has right now is different.  It is associated with dizziness and nausea.    Headache    Review of Systems   Constitutional:  Negative for chills and fever.   Gastrointestinal:  Positive for nausea. Negative for vomiting.   Neurological:  Positive for dizziness and headaches. Negative for speech difficulty, weakness and light-headedness.   Psychiatric/Behavioral:  Negative for confusion.      Past Medical History:   Diagnosis Date    SHAY (acute kidney injury) ([facility]) 2/3/2021    Anxiety 2018    Arthritis 2008    Back pain     Depression     GERD (gastroesophageal reflux disease)     Headache(784.0) 1978    Hyperlipidemia      Past Surgical History:   Procedure Laterality Date    APPENDECTOMY      COLONOSCOPY      HYSTERECTOMY       Family History   Problem Relation Age of Onset    Heart failure Mother     Arthritis Mother     Coronary artery disease Mother     Lung cancer Father     Cancer Father     Lupus Daughter     No Known Problems Maternal Grandmother     No Known Problems Maternal Grandfather     Cancer Paternal Grandfather         unknown type    Multiple sclerosis Brother     Bipolar disorder Son     Asthma Son     Breast cancer Maternal Aunt     No Known Problems Maternal Aunt     No Known Problems Paternal Aunt     No Known Problems  Paternal Aunt     No Known Problems Paternal Aunt      Social History     Tobacco Use    Smoking status: Former     Current packs/day: 0.00     Average packs/day: 1 pack/day for 20.0 years (20.0 ttl pk-yrs)     Types: Cigarettes     Start date:      Quit date:      Years since quittin.2    Smokeless tobacco: Never   Vaping Use    Vaping status: Never Used   Substance and Sexual Activity    Alcohol use: Yes     Alcohol/week: 3.0 standard drinks of alcohol     Types: 3 Glasses of wine per week     Comment: 1/2 glass of wine at a time    Drug use: Never    Sexual activity: Not on file     Current Outpatient Medications on File Prior to Visit   Medication Sig    butalbital-acetaminophen-caffeine (FIORICET,ESGIC) -40 mg per tablet Take 1 tablet by mouth every 4 (four) hours as needed for headaches    escitalopram (LEXAPRO) 20 mg tablet TAKE 1 TABLET BY MOUTH EVERY DAY    gabapentin (NEURONTIN) 300 mg capsule Take 1 capsule (300 mg total) by mouth daily at bedtime    Multiple Vitamins-Minerals (MULTIVITAL PO) Take 1 tablet by mouth daily    omeprazole (PriLOSEC) 20 mg delayed release capsule Take 1 capsule (20 mg total) by mouth daily    propranolol (INDERAL LA) 120 mg 24 hr capsule TAKE 1 CAPSULE BY MOUTH EVERY DAY    rosuvastatin (CRESTOR) 40 MG tablet TAKE 1 TABLET BY MOUTH EVERY DAY    SUMAtriptan (IMITREX) 25 mg tablet Take 1 tablet (25 mg total) by mouth once as needed for migraine    [DISCONTINUED] predniSONE 10 mg tablet 4 tabs po qd x 2 days then 3 tabs po qd x 2 days then 2 tabs po qd x 2 days then 1 tab po qd x 2 days (Patient not taking: Reported on 3/24/2025)    [DISCONTINUED] triamcinolone (KENALOG) 0.1 % cream Apply topically 2 (two) times a day as needed for rash Do not use for over 2 weeks at a time. (Patient not taking: Reported on 3/24/2025)     No Known Allergies  Immunization History   Administered Date(s) Administered    COVID-19 PFIZER VACCINE 0.3 ML IM 03/10/2021, 2021,  12/04/2021    COVID-19 Pfizer Vac BIVALENT Jose-sucrose 12 Yr+ IM 11/17/2022    COVID-19 Pfizer vac (Jose-sucrose, gray cap) 12 yr+ IM 06/30/2022    INFLUENZA 01/01/2006    Influenza Quadrivalent Preservative Free 3 years and older IM 10/17/2014, 10/01/2015    Influenza Split High Dose Preservative Free IM 01/09/2018, 11/06/2024    Influenza, high dose seasonal 0.7 mL 10/23/2019, 10/21/2020, 10/25/2021, 10/28/2022, 10/30/2023    Influenza, seasonal, injectable 10/18/2011, 12/06/2013    Pneumococcal Conjugate 13-Valent 01/09/2018    Pneumococcal Polysaccharide PPV23 03/25/2019    Tdap 01/31/2014, 01/29/2021     Objective   /78   Pulse 66   Temp 97.5 °F (36.4 °C)   Wt 74.4 kg (164 lb)   SpO2 98%   BMI 24.94 kg/m²     Physical Exam  Constitutional:       General: She is not in acute distress.     Appearance: She is not toxic-appearing.   Cardiovascular:      Rate and Rhythm: Normal rate.      Pulses: Normal pulses.   Pulmonary:      Effort: No respiratory distress.   Neurological:      Cranial Nerves: No cranial nerve deficit.      Motor: No weakness.      Coordination: Coordination normal.      Gait: Gait normal.   Psychiatric:         Mood and Affect: Mood normal.

## 2025-03-24 NOTE — ASSESSMENT & PLAN NOTE
New onset headaches.  Will do a CT scan of the head to exclude any gross acute intracranial process.  If no improvement in few weeks he will follow-up with us in the office.    Orders:    CT head wo contrast; Future

## 2025-03-28 ENCOUNTER — HOSPITAL ENCOUNTER (OUTPATIENT)
Dept: RADIOLOGY | Facility: HOSPITAL | Age: 75
Discharge: HOME | End: 2025-03-28
Attending: INTERNAL MEDICINE
Payer: COMMERCIAL

## 2025-03-28 DIAGNOSIS — R51.9 NEW ONSET OF HEADACHES: ICD-10-CM

## 2025-03-28 PROCEDURE — 70450 CT HEAD/BRAIN W/O DYE: CPT

## 2025-03-31 ENCOUNTER — RESULTS FOLLOW-UP (OUTPATIENT)
Dept: FAMILY MEDICINE CLINIC | Facility: CLINIC | Age: 75
End: 2025-03-31

## 2025-04-07 DIAGNOSIS — G43.909 MIGRAINE WITHOUT STATUS MIGRAINOSUS, NOT INTRACTABLE, UNSPECIFIED MIGRAINE TYPE: ICD-10-CM

## 2025-04-07 DIAGNOSIS — G43.701 CHRONIC MIGRAINE WITHOUT AURA WITH STATUS MIGRAINOSUS, NOT INTRACTABLE: ICD-10-CM

## 2025-04-07 RX ORDER — SUMATRIPTAN SUCCINATE 25 MG/1
25 TABLET ORAL ONCE AS NEEDED
Qty: 9 TABLET | Refills: 0 | Status: SHIPPED | OUTPATIENT
Start: 2025-04-07

## 2025-04-08 RX ORDER — BUTALBITAL, ACETAMINOPHEN AND CAFFEINE 50; 325; 40 MG/1; MG/1; MG/1
1 TABLET ORAL EVERY 4 HOURS PRN
Qty: 20 TABLET | Refills: 0 | Status: SHIPPED | OUTPATIENT
Start: 2025-04-08

## 2025-04-30 ENCOUNTER — OFFICE VISIT (OUTPATIENT)
Dept: OBGYN CLINIC | Facility: MEDICAL CENTER | Age: 75
End: 2025-04-30
Payer: COMMERCIAL

## 2025-04-30 VITALS — HEIGHT: 68 IN | BODY MASS INDEX: 24.71 KG/M2 | WEIGHT: 163 LBS

## 2025-04-30 DIAGNOSIS — G89.29 CHRONIC RIGHT SHOULDER PAIN: Primary | ICD-10-CM

## 2025-04-30 DIAGNOSIS — M75.81 TENDINITIS OF RIGHT ROTATOR CUFF: ICD-10-CM

## 2025-04-30 DIAGNOSIS — M25.511 CHRONIC RIGHT SHOULDER PAIN: Primary | ICD-10-CM

## 2025-04-30 PROCEDURE — 99213 OFFICE O/P EST LOW 20 MIN: CPT | Performed by: EMERGENCY MEDICINE

## 2025-04-30 NOTE — PROGRESS NOTES
Name: You Desir      : 1950      MRN: [id number]  Encounter Provider: Brett Jose MD  Encounter Date: 2025   Encounter department: [facility]  :  Assessment & Plan  Chronic right shoulder pain  You had improvement with SA CSI however pain returned 2 weeks ago.  Discussed OTC meds and PT, as well as risks/benefits of repeat CSI.  If no improvement t/c repeat CSI vs MRI.    Orders:    Ambulatory Referral to Physical Therapy; Future    Tendinitis of right rotator cuff    Orders:    Ambulatory Referral to Physical Therapy; Future          Return in about 6 weeks (around 2025).      Subjective:     History of Present Illness   Patient returns s/p Right SA CSI last ev with significant improvement, however pain has returned over the last several weeks, and interfered with sleep the last couple days. Taking Tylenol and ASA for pain    Initial note:  NP presents for about a month of worsening Right lateral shoulder for which she completed a course of oral steroids.  Also taking Tylenol and Advil.            Review of Systems    The following portions of the patient's chart were reviewed and updated as appropriate:   Allergy:  No Known Allergies    Medications:    Current Outpatient Medications:     butalbital-acetaminophen-caffeine (FIORICET,ESGIC) -40 mg per tablet, Take 1 tablet by mouth every 4 (four) hours as needed for headaches, Disp: 20 tablet, Rfl: 0    escitalopram (LEXAPRO) 20 mg tablet, TAKE 1 TABLET BY MOUTH EVERY DAY, Disp: 90 tablet, Rfl: 0    gabapentin (NEURONTIN) 300 mg capsule, Take 1 capsule (300 mg total) by mouth daily at bedtime, Disp: 90 capsule, Rfl: 1    Multiple Vitamins-Minerals (MULTIVITAL PO), Take 1 tablet by mouth daily, Disp: , Rfl:     omeprazole (PriLOSEC) 20 mg delayed release capsule, Take 1 capsule (20 mg total) by mouth daily, Disp: 90 capsule, Rfl: 1    propranolol (INDERAL LA) 120 mg 24 hr capsule, TAKE  "1 CAPSULE BY MOUTH EVERY DAY, Disp: 90 capsule, Rfl: 1    rosuvastatin (CRESTOR) 40 MG tablet, TAKE 1 TABLET BY MOUTH EVERY DAY, Disp: 90 tablet, Rfl: 1    SUMAtriptan (IMITREX) 25 mg tablet, Take 1 tablet (25 mg total) by mouth once as needed for migraine, Disp: 9 tablet, Rfl: 0    Patient Active Problem List   Diagnosis    Abnormal EKG    Benign neoplasm of colon    Breast tenderness in female    Neck pain    Hearing loss    Displacement of thoracic intervertebral disc without myelopathy    Hyperlipidemia    Irritable bowel syndrome    Levoscoliosis    Lightheadedness    Chronic low back pain    Memory difficulties    Migraine    Onychomycosis    Osteoarthritis of both hands    Palpitations    Skin lesions    Anxiety    Gastritis    Overweight (BMI 25.0-29.9)    Lumbar radiculopathy    Spinal stenosis of lumbar region    Diarrhea    Prediabetes    Celiac disease    Sweats, menopausal    Other fatigue    Acute pain of right shoulder    New onset of headaches       Objective:  Objective   Ht 5' 8\" (1.727 m)   Wt 73.9 kg (163 lb)   BMI 24.78 kg/m²         Right Shoulder Exam     Range of Motion   Active abduction:  abnormal   External rotation:  normal     Muscle Strength   External rotation: 5/5     Tests   Drop arm: positive      Left Shoulder Exam     Range of Motion   Active abduction:  normal   External rotation:  normal     Muscle Strength   External rotation: 5/5              Physical Exam      Neurologic Exam    Procedures    I have personally reviewed the written report of the pertinent studies.             Past Medical History:   Diagnosis Date    SHAY (acute kidney injury) ([facility]) 2/3/2021    Anxiety 2018    Arthritis 2008    Back pain     Depression     GERD (gastroesophageal reflux disease) 2020    Headache(784.0) 1978    Hyperlipidemia        Past Surgical History:   Procedure Laterality Date    APPENDECTOMY      COLONOSCOPY      HYSTERECTOMY         Social History     Socioeconomic History    Marital " status: /Civil Union     Spouse name: Not on file    Number of children: Not on file    Years of education: Not on file    Highest education level: Not on file   Occupational History    Not on file   Tobacco Use    Smoking status: Former     Current packs/day: 0.00     Average packs/day: 1 pack/day for 20.0 years (20.0 ttl pk-yrs)     Types: Cigarettes     Start date:      Quit date:      Years since quittin.3    Smokeless tobacco: Never   Vaping Use    Vaping status: Never Used   Substance and Sexual Activity    Alcohol use: Yes     Alcohol/week: 3.0 standard drinks of alcohol     Types: 3 Glasses of wine per week     Comment: 1/2 glass of wine at a time    Drug use: Never    Sexual activity: Not on file   Other Topics Concern    Not on file   Social History Narrative    Not on file     Social Drivers of Health     Financial Resource Strain: Low Risk  (10/25/2023)    Overall Financial Resource Strain (CARDIA)     Difficulty of Paying Living Expenses: Not very hard   Food Insecurity: No Food Insecurity (2024)    Hunger Vital Sign     Worried About Running Out of Food in the Last Year: Never true     Ran Out of Food in the Last Year: Never true   Transportation Needs: No Transportation Needs (2024)    PRAPARE - Transportation     Lack of Transportation (Medical): No     Lack of Transportation (Non-Medical): No   Physical Activity: Not on file   Stress: Not on file   Social Connections: Not on file   Intimate Partner Violence: Not on file   Housing Stability: Low Risk  (2024)    Housing Stability Vital Sign     Unable to Pay for Housing in the Last Year: No     Number of Times Moved in the Last Year: 0     Homeless in the Last Year: No       Family History   Problem Relation Age of Onset    Heart failure Mother     Arthritis Mother     Coronary artery disease Mother     Lung cancer Father     Cancer Father     Lupus Daughter     No Known Problems Maternal Grandmother     No Known  Problems Maternal Grandfather     Cancer Paternal Grandfather         unknown type    Multiple sclerosis Brother     Bipolar disorder Son     Asthma Son     Breast cancer Maternal Aunt     No Known Problems Maternal Aunt     No Known Problems Paternal Aunt     No Known Problems Paternal Aunt     No Known Problems Paternal Aunt

## 2025-04-30 NOTE — ASSESSMENT & PLAN NOTE
You had improvement with SA CSI however pain returned 2 weeks ago.  Discussed OTC meds and PT, as well as risks/benefits of repeat CSI.  If no improvement t/c repeat CSI vs MRI.    Orders:    Ambulatory Referral to Physical Therapy; Future

## 2025-04-30 NOTE — PATIENT INSTRUCTIONS
You may use Advil (ibuprofen) 400-600mg every 6 hours or at least twice per day (OR Aleve (naproxen) 250-500mg every 12 hours as needed for pain and inflammation).  However do not mix or take other NSAIDs together such as Advil, Motrin, ibuprofen, Celebrex, naproxen, diclofenac or Aleve.    You may also take Tylenol (acetaminophen) together with Advil (ibuprofen) or Aleve (naproxen) as this is safe and can help decrease your pain levels.  The dosing for Tylenol is 500mg every 4-6 hours as needed OR max 1,000mg per dose up to 3 times per day for a total of 3,000mg per day  *Check with your primary care physician to see if these medications are safe to take and to make sure they do not interfere with your other medications and medical issues.        Rotator Cuff Exercises     About this topic   A rotator cuff tear or tendinitis a problem that can limit how much you can move your shoulder. It can also be painful. The rotator cuff is a group of muscles and tendons in your shoulder. It helps your shoulder move and be steady. These muscles help to rotate and lift the arm. Tendons are strong bands that connect muscles to bones. You can irritate or tear a tendon in your shoulder if you perform repetitive actions with the arm/shoulder, or if you fall or move your shoulder too fast and with too much force. Your tendon can also wear out over time and tear. Large tears may require surgery. For small tears, exercises may help make this problem better.  General   Before starting with a program, ask your doctor if you are healthy enough to do these exercises. Your doctor may have you work with a  or physical therapist to make a safe exercise program to meet your needs. You should not do the exercises if they cause sharp pains. You may need to start out with easy exercises at first. Then, once your shoulder is feeling better, you may start the harder exercises.  Passive Exercises:   You use the movement of your body to move  your arm. Do NOT use your shoulder muscles to move your arm.  Pendulum exercises ? Hold onto a table with one hand and bend forward at the waist until your back is level with the table. Let your sore arm hang in front of you. Do each exercise for 1 minute.  Circles ? Move your body in large circles one way. After you move a few times, your arm should start gently moving in circles. Now, move your body in circles the other way until your arm moves the other way.  Swinging side-to-side ? Move your body side to side. After you move a few times, your arm should start gently moving side-to-side.  Swinging back and forth ? Move your body forwards and then backwards. After you move a few times, your arm should start gently moving back and forth.  Stretching Exercises   Stretching exercises keep your muscles flexible. They also stop them from getting tight. Start by doing each of these stretches 2 to 3 times. In order for your body to make changes, you will need to hold these stretches for 20 to 30 seconds. Try to do the stretches 2 to 3 times each day. Do all exercises slowly.  Strengthening Exercises   Strengthening exercises keep your muscles firm and strong. Start by repeating each exercise 2 to 3 times. Work up to doing each exercise 10 times. Try to do the exercises 2 to 3 times each day. Do all exercises slowly.  Shoulder blade squeezes ? Pinch your shoulder blades together on your upper back and hold 3 to 5 seconds. Relax.  Cane rehab exercises:  Overhead flexions ? Lie down and grab the cane with both hands. Start with your arms straight and the cane resting on your upper legs. Keep your arms straight and lift the cane over your head.  Abductions or side-to-side motion ? Stand and grab the cane with both hands. Turn your thumbs to the left to stretch your left shoulder. Start with your arms straight and the cane resting on your upper legs. Push the cane to the left, raising your left arm. Keep your left elbow  straight. Keep pushing until you feel a good stretch. Switch the position of your hands to point your thumbs to the right and push the cane right to stretch your right shoulder.  External rotations or side-to-side rotations ? Lie down and grab the cane with both hands. Start with your elbows bent and touching your body. Put the tip of the cane in the palm of your right hand to stretch your right shoulder. Grab the cane at the other end with your left hand. Push the cane sideways into your right palm until you feel a stretch in your shoulder. Be sure to keep your right elbow close to your body while you are stretching. Switch hands to stretch the left shoulder.  Internal rotations ? Stand up and grab the cane with both hands behind your back. With your palms facing backwards, slide the cane up your back. Go until you feel a good stretch in front of the shoulder.  Shoulder blade exercises ? Lie on your stomach near the edge of a mat or bed or supported on an exercise ball. Start with your arms hanging down in a relaxed position.  Y position ? Raise your arms up and to the sides so your elbows are near your ears and your arms are straight out diagonally like the letter Y. Lower the arms back to the starting position.  T position ? Raise your arms straight out to the sides, even with your shoulders. Lower the arms back to the starting position.  W position ? Raise your arms up and to the sides with your elbows bent. Your hands should be just above your shoulders and looks like a W from above. Lower the arms back to the starting position.  L position ? Keep your elbows at your sides and raise just your lower arms out to the side to make a letter L and a backwards letter L. Lower the arms back to the starting position.  Shoulder exercises ? Tie a knot in an exercise band. Secure the band in a door by shutting it in around waist level. Wrap the band around one hand for a good . Stand away from the door enough to have  "slight tension in the band. As the exercises get easier, you may need to change to a heavier band. Moving closer to, or farther away from, the point where the band is tied down will decrease or increase the tension in the band.  Internal rotations ? Face sideways with the arm holding the band closest to the door. Bend the elbow to 90 degrees or in an \"L\" position. Keeping your elbow by your side and bent, pull the band across your body. Bring it back to the starting position. Repeat with the other arm.  External rotations ? Face sideways with the arm holding the band farthest from the door. Bend the elbow to 90 degrees or in an \"L\" position. Keeping your elbow by your side and bent, pull the band away from your body. Bring it back to the starting position. Repeat with the other arm.  Abductions ? Face sideways with the arm holding the band farthest from the door. Be sure that your thumb is facing upward. Keep your elbow straight and pull the band out to the side and away from your body. Go up to shoulder level. Bring it back to the starting position. Repeat with the other arm.  Flexions ? Face away from the door. Keeping your elbow straight, pull the band straight out in front of you. Bring it back to the starting position. Repeat with the other arm.                  What will the results be?   Less pain  More strength  Able to move your arm more  Easier to do daily activities  Shoulder is more stable  Prevent re-injury  Helpful tips   Stay active and work out to keep your muscles strong and flexible.  Eat a healthy diet to keep your muscles healthy.  Be sure you do not hold your breath when exercising. This can raise your blood pressure. If you tend to hold your breath, try counting out loud when exercising. If any exercise bothers you, stop right away.  Always warm up before stretching. Heated muscles stretch much easier than cool muscles. Stretching cool muscles can lead to injury.  Try swinging your arms at an " easy pace for a few minutes to warm up your muscles. Do this again after exercising.  Never bounce when doing stretches.  After exercising, it is a good idea to use ice. Place an ice pack or a bag of frozen peas wrapped in a towel over the painful part. Never put ice right on the skin. Do not leave the ice on more than 10 to 15 minutes at a time. Ice after activity may help decrease pain and swelling. Never ice before stretching.  Doing exercises before a meal may be a good way to get into a routine.  Exercise may be slightly uncomfortable, but you should not have sharp pains. If you do get sharp pains, stop what you are doing. If the sharp pains continue, call your doctor.  Last Reviewed Date   2020-03-10  Consumer Information Use and Disclaimer   This generalized information is a limited summary of diagnosis, treatment, and/or medication information. It is not meant to be comprehensive and should be used as a tool to help the user understand and/or assess potential diagnostic and treatment options. It does NOT include all information about conditions, treatments, medications, side effects, or risks that may apply to a specific patient. It is not intended to be medical advice or a substitute for the medical advice, diagnosis, or treatment of a health care provider based on the health care provider's examination and assessment of a patient’s specific and unique circumstances. Patients must speak with a health care provider for complete information about their health, medical questions, and treatment options, including any risks or benefits regarding use of medications. This information does not endorse any treatments or medications as safe, effective, or approved for treating a specific patient. Up[employer], Inc[employer] and its affiliates disclaim any warranty or liability relating to this information or the use thereof. The use of this information is governed by the Terms of Use, available at  https://www.woltersAddvocateuwer.com/en/know/clinical-effectiveness-terms   Copyright   Copyright © 2024 [employer], Inc[employer] and its affiliates and/or licensors. All rights reserved.

## 2025-05-15 DIAGNOSIS — F41.9 ANXIETY: ICD-10-CM

## 2025-05-15 RX ORDER — ESCITALOPRAM OXALATE 20 MG/1
20 TABLET ORAL DAILY
Qty: 90 TABLET | Refills: 1 | Status: SHIPPED | OUTPATIENT
Start: 2025-05-15

## 2025-05-28 DIAGNOSIS — R10.10 UPPER ABDOMINAL PAIN: ICD-10-CM

## 2025-05-28 RX ORDER — OMEPRAZOLE 20 MG/1
20 CAPSULE, DELAYED RELEASE ORAL DAILY
Qty: 90 CAPSULE | Refills: 1 | Status: SHIPPED | OUTPATIENT
Start: 2025-05-28

## 2025-05-30 DIAGNOSIS — G43.701 CHRONIC MIGRAINE WITHOUT AURA WITH STATUS MIGRAINOSUS, NOT INTRACTABLE: ICD-10-CM

## 2025-05-31 RX ORDER — SUMATRIPTAN SUCCINATE 25 MG/1
25 TABLET ORAL ONCE AS NEEDED
Qty: 9 TABLET | Refills: 0 | Status: SHIPPED | OUTPATIENT
Start: 2025-05-31

## 2025-06-11 ENCOUNTER — OFFICE VISIT (OUTPATIENT)
Dept: OBGYN CLINIC | Facility: MEDICAL CENTER | Age: 75
End: 2025-06-11
Payer: COMMERCIAL

## 2025-06-11 VITALS — HEIGHT: 68 IN | BODY MASS INDEX: 24.55 KG/M2 | WEIGHT: 162 LBS

## 2025-06-11 DIAGNOSIS — M54.42 CHRONIC MIDLINE LOW BACK PAIN WITH LEFT-SIDED SCIATICA: ICD-10-CM

## 2025-06-11 DIAGNOSIS — M25.511 CHRONIC RIGHT SHOULDER PAIN: Primary | ICD-10-CM

## 2025-06-11 DIAGNOSIS — G89.29 CHRONIC RIGHT SHOULDER PAIN: Primary | ICD-10-CM

## 2025-06-11 DIAGNOSIS — G43.909 MIGRAINE WITHOUT STATUS MIGRAINOSUS, NOT INTRACTABLE, UNSPECIFIED MIGRAINE TYPE: ICD-10-CM

## 2025-06-11 DIAGNOSIS — G89.29 CHRONIC MIDLINE LOW BACK PAIN WITH LEFT-SIDED SCIATICA: ICD-10-CM

## 2025-06-11 DIAGNOSIS — M75.81 TENDINITIS OF RIGHT ROTATOR CUFF: ICD-10-CM

## 2025-06-11 PROCEDURE — 99213 OFFICE O/P EST LOW 20 MIN: CPT | Performed by: EMERGENCY MEDICINE

## 2025-06-11 PROCEDURE — 20610 DRAIN/INJ JOINT/BURSA W/O US: CPT | Performed by: EMERGENCY MEDICINE

## 2025-06-11 RX ORDER — GABAPENTIN 300 MG/1
300 CAPSULE ORAL
Qty: 90 CAPSULE | Refills: 1 | Status: SHIPPED | OUTPATIENT
Start: 2025-06-11

## 2025-06-11 RX ORDER — METHYLPREDNISOLONE ACETATE 40 MG/ML
1 INJECTION, SUSPENSION INTRA-ARTICULAR; INTRALESIONAL; INTRAMUSCULAR; SOFT TISSUE
Status: COMPLETED | OUTPATIENT
Start: 2025-06-11 | End: 2025-06-11

## 2025-06-11 RX ORDER — ROPIVACAINE HYDROCHLORIDE 2 MG/ML
4 INJECTION, SOLUTION EPIDURAL; INFILTRATION; PERINEURAL
Status: COMPLETED | OUTPATIENT
Start: 2025-06-11 | End: 2025-06-11

## 2025-06-11 RX ADMIN — ROPIVACAINE HYDROCHLORIDE 4 ML: 2 INJECTION, SOLUTION EPIDURAL; INFILTRATION; PERINEURAL at 13:45

## 2025-06-11 RX ADMIN — METHYLPREDNISOLONE ACETATE 1 ML: 40 INJECTION, SUSPENSION INTRA-ARTICULAR; INTRALESIONAL; INTRAMUSCULAR; SOFT TISSUE at 13:45

## 2025-06-11 NOTE — PATIENT INSTRUCTIONS
"Patient Education     Steroid injection   The Basics   Written by the doctors and editors at [facility]   What is a steroid injection? -- Steroids, also known as \"glucocorticoids,\" are medicines that help reduce swelling and pain. Doctors sometimes inject a steroid medicine into a joint or other part of the body to relieve pain. This is also sometimes called a \"cortisone shot.\"  After the injection, the steroid starts to work within a few days.  How long does a steroid injection work? -- It depends on the person and where the injection is given. For some people, the effects of a steroid injection can last for a few weeks or longer.  Sometimes, the doctor also injects a medicine called a \"local anesthetic\" with the steroid. This might help relieve pain until the steroid starts to work.  A steroid injection can help with pain, but it won't cure the problem that is causing the pain.  How do I prepare for a steroid injection? -- The doctor or nurse will tell you if you need to do anything special to prepare. Before your procedure, your doctor will do an exam.  Your doctor will also ask you about your \"health history.\" This involves asking you questions about any health problems you have or had in the past, past surgeries, and any medicines you take. Tell them about:   Any medicines you are taking - This includes any prescription or \"over-the-counter\" medicines you use, plus any herbal supplements you take. It helps to write down and bring a list of any medicines you take, or bring a bag with all of your medicines with you.   Any allergies you have   Any bleeding problems you have   Any other steroid injections you have had  Ask the doctor or nurse if you have questions or if there is anything you do not understand.  How is a steroid injection given? -- When it is time for the injection:   The doctor will clean the skin over the area where they plan to give the shot. (This is called the \"injection site.\")   They might use " ultrasound or a special kind of X-ray during the procedure. This is to check where to give the steroid.   Sometimes, they might give a shot of medicine to numb the skin.   They will inject the steroid.   Then, they will take out the needle and cover the injection site with a bandage.  What happens after a steroid injection? -- You can go home after the injection.  For the next few days, you might want to:   Apply a cold gel pack, bag of ice, or bag of frozen vegetables to the injection site every 1 to 2 hours, for 15 minutes each time. Put a thin towel between the ice (or other cold object) and your skin.   Rest the treated body part for a few days.   Take medicines to relieve pain. Examples include acetaminophen (sample brand name: Tylenol), ibuprofen (sample brand names: Advil, Motrin), or naproxen (sample brand name: Aleve).  If you have diabetes, the doctor might want you to check your blood sugar levels more often for a few days. The steroid medicine might temporarily raise your blood sugar.  What are the risks of a steroid injection? -- Your doctor will talk to you about all of the possible risks, and answer your questions. Possible risks include:   Bleeding, bruising, or soreness at the injection site   Damage to parts near the injection site - This might include cartilage damage, injury to nerves, tendon rupture, or thinning of skin and bones.   Skin around the injection site becoming lighter or whiter in color   Infection   Health conditions like diabetes or high blood pressure getting worse for a few days   Allergic reaction to the medicine  What else should I know? -- Most of the time, doctors limit the total number of steroid injections to a certain area.  A steroid injection might be only 1 part of your treatment plan. Take your other medicines as instructed. Also, follow your doctor's recommendations about other treatments. These might include things like physical therapy or devices like a brace or  cane.  All topics are updated as new evidence becomes available and our peer review process is complete.  This topic retrieved from [employer] on: Apr 25, 2024.  Topic 809168 Version 2.0  Release: 32.4.2 - C32.114  © 2024 [employer] and/or its affiliates. All rights reserved.  Consumer Information Use and Disclaimer   Disclaimer: This generalized information is a limited summary of diagnosis, treatment, and/or medication information. It is not meant to be comprehensive and should be used as a tool to help the user understand and/or assess potential diagnostic and treatment options. It does NOT include all information about conditions, treatments, medications, side effects, or risks that may apply to a specific patient. It is not intended to be medical advice or a substitute for the medical advice, diagnosis, or treatment of a health care provider based on the health care provider's examination and assessment of a patient's specific and unique circumstances. Patients must speak with a health care provider for complete information about their health, medical questions, and treatment options, including any risks or benefits regarding use of medications. This information does not endorse any treatments or medications as safe, effective, or approved for treating a specific patient. Up[employer], Inc[employer] and its affiliates disclaim any warranty or liability relating to this information or the use thereof.The use of this information is governed by the Terms of Use, available at https://www.woltersTuteeuwer.com/en/know/clinical-effectiveness-terms. 2024© UpToDate, Inc. and its affiliates and/or licensors. All rights reserved.  Copyright   © 2024 [employer]Date, Inc. and/or its affiliates. All rights reserved.

## 2025-06-11 NOTE — PROGRESS NOTES
"Name: You Desir      : 1950      MRN: [id number]  Encounter Provider: Brett Jose MD  Encounter Date: 2025   Encounter department: [facility]  :  Assessment & Plan  Chronic right shoulder pain  Discussed formal PT, provided home exercises  If no improvement will obtain MRI  Reviewed Xrays  Orders:    Large joint arthrocentesis: R subacromial bursa    Tendinitis of right rotator cuff    Orders:    Large joint arthrocentesis: R subacromial bursa      Return in about 3 months (around 2025).      Subjective:     History of Present Illness   You returns with continued right shoulder pain, increased with reaching/overhead, and also pain at night time.      Previous note:  Patient returns s/p Right SA CSI last ev with significant improvement, however pain has returned over the last several weeks, and interfered with sleep the last couple days. Taking Tylenol and ASA for pain    Initial note:  NP presents for about a month of worsening Right lateral shoulder for which she completed a course of oral steroids.  Also taking Tylenol and Advil.            Review of Systems    The following portions of the patient's chart were reviewed and updated as appropriate:   Allergy:  Allergies[1]    Medications:  Current Medications[2]    Problem List[3]    Objective:  Objective   Ht 5' 8\" (1.727 m)   Wt 73.5 kg (162 lb)   BMI 24.63 kg/m²         Right Shoulder Exam     Range of Motion   Active abduction:  abnormal     Tests   Drop arm: negative    Other   Erythema: absent            Physical Exam      Neurologic Exam    Large joint arthrocentesis: R subacromial bursa    Performed by: Brett Jose MD  Authorized by: Brett Jose MD    Universal Protocol:  Consent: Verbal consent obtained  Risks and benefits: risks, benefits and alternatives were discussed  Consent given by: patient  Time out: Immediately prior to procedure a \"time out\" was called to verify the " correct patient, procedure, equipment, support staff and site/side marked as required.  Timeout called at: 2025 1:56 PM.  Patient understanding: patient states understanding of the procedure being performed  Test results: test results available and properly labeled  Site marked: the operative site was marked  Patient identity confirmed: verbally with patient  Supporting Documentation  Indications: pain     Is this a Visco injection? NoProcedure Details  Location: shoulder - R subacromial bursa  Preparation: Patient was prepped and draped in the usual sterile fashion  Needle size: 22 G  Ultrasound guidance: no  Approach: posterolateral  Medications administered: 1 mL methylPREDNISolone acetate 40 mg/mL; 4 mL ropivacaine 0.2 %    Patient tolerance: patient tolerated the procedure well with no immediate complications  Dressing:  Sterile dressing applied    No erythema of Shoulder(s)          I have personally reviewed the written report of the pertinent studies.   XR SHOULDER 2+ VW RIGHT     INDICATION: M25.511: Pain in right shoulder.     COMPARISON: None     FINDINGS:     No acute fracture or dislocation.     No significant degenerative changes.     No lytic or blastic osseous lesion.     Unremarkable soft tissues.     IMPRESSION:     No acute osseous abnormality.               Past Medical History[4]    Past Surgical History[5]    Social History     Socioeconomic History    Marital status: /Civil Union     Spouse name: Not on file    Number of children: Not on file    Years of education: Not on file    Highest education level: Not on file   Occupational History    Not on file   Tobacco Use    Smoking status: Former     Current packs/day: 0.00     Average packs/day: 1 pack/day for 20.0 years (20.0 ttl pk-yrs)     Types: Cigarettes     Start date:      Quit date:      Years since quittin.4    Smokeless tobacco: Never   Vaping Use    Vaping status: Never Used   Substance and Sexual Activity     Alcohol use: Yes     Alcohol/week: 3.0 standard drinks of alcohol     Types: 3 Glasses of wine per week     Comment: 1/2 glass of wine at a time    Drug use: Never    Sexual activity: Not on file   Other Topics Concern    Not on file   Social History Narrative    Not on file     Social Drivers of Health     Financial Resource Strain: Low Risk  (10/25/2023)    Overall Financial Resource Strain (CARDIA)     Difficulty of Paying Living Expenses: Not very hard   Food Insecurity: No Food Insecurity (12/1/2024)    Nursing - Inadequate Food Risk Classification     Worried About Running Out of Food in the Last Year: Never true     Ran Out of Food in the Last Year: Never true     Ran Out of Food in the Last Year: Not on file   Transportation Needs: No Transportation Needs (12/1/2024)    PRAPARE - Transportation     Lack of Transportation (Medical): No     Lack of Transportation (Non-Medical): No   Physical Activity: Not on file   Stress: Not on file   Social Connections: Not on file   Intimate Partner Violence: Not on file   Housing Stability: Low Risk  (12/1/2024)    Housing Stability Vital Sign     Unable to Pay for Housing in the Last Year: No     Number of Times Moved in the Last Year: 0     Homeless in the Last Year: No       Family History[6]           [1] No Known Allergies  [2]   Current Outpatient Medications:     butalbital-acetaminophen-caffeine (FIORICET,ESGIC) -40 mg per tablet, Take 1 tablet by mouth every 4 (four) hours as needed for headaches, Disp: 20 tablet, Rfl: 0    escitalopram (LEXAPRO) 20 mg tablet, TAKE 1 TABLET BY MOUTH EVERY DAY, Disp: 90 tablet, Rfl: 1    gabapentin (NEURONTIN) 300 mg capsule, Take 1 capsule (300 mg total) by mouth daily at bedtime, Disp: 90 capsule, Rfl: 1    Multiple Vitamins-Minerals (MULTIVITAL PO), Take 1 tablet by mouth in the morning., Disp: , Rfl:     omeprazole (PriLOSEC) 20 mg delayed release capsule, TAKE 1 CAPSULE BY MOUTH EVERY DAY, Disp: 90 capsule, Rfl: 1     propranolol (INDERAL LA) 120 mg 24 hr capsule, TAKE 1 CAPSULE BY MOUTH EVERY DAY, Disp: 90 capsule, Rfl: 1    rosuvastatin (CRESTOR) 40 MG tablet, TAKE 1 TABLET BY MOUTH EVERY DAY, Disp: 90 tablet, Rfl: 1    SUMAtriptan (IMITREX) 25 mg tablet, Take 1 tablet (25 mg total) by mouth once as needed for migraine, Disp: 9 tablet, Rfl: 0  [3]   Patient Active Problem List  Diagnosis    Abnormal EKG    Benign neoplasm of colon    Breast tenderness in female    Neck pain    Hearing loss    Displacement of thoracic intervertebral disc without myelopathy    Hyperlipidemia    Irritable bowel syndrome    Levoscoliosis    Lightheadedness    Chronic low back pain    Memory difficulties    Migraine    Onychomycosis    Osteoarthritis of both hands    Palpitations    Skin lesions    Anxiety    Gastritis    Overweight (BMI 25.0-29.9)    Lumbar radiculopathy    Spinal stenosis of lumbar region    Diarrhea    Prediabetes    Celiac disease    Sweats, menopausal    Other fatigue    Acute pain of right shoulder    New onset of headaches    Chronic right shoulder pain    Tendinitis of right rotator cuff   [4]   Past Medical History:  Diagnosis Date    SHAY (acute kidney injury) ([facility]) 2/3/2021    Anxiety 2018    Arthritis 2008    Back pain     Depression     GERD (gastroesophageal reflux disease) 2020    Headache(784.0) 1978    Hyperlipidemia    [5]   Past Surgical History:  Procedure Laterality Date    APPENDECTOMY      COLONOSCOPY      HYSTERECTOMY     [6]   Family History  Problem Relation Name Age of Onset    Heart failure Mother Allison     Arthritis Mother Allison     Coronary artery disease Mother Allison     Lung cancer Father Chuckie     Cancer Father Chuckie     Lupus Daughter      No Known Problems Maternal Grandmother      No Known Problems Maternal Grandfather      Cancer Paternal Grandfather          unknown type    Multiple sclerosis Brother      Bipolar disorder Son      Asthma Son      Breast cancer Maternal Aunt      No Known Problems  Maternal Aunt      No Known Problems Paternal Aunt      No Known Problems Paternal Aunt      No Known Problems Paternal Aunt

## 2025-06-11 NOTE — ASSESSMENT & PLAN NOTE
Discussed formal PT, provided home exercises  If no improvement will obtain MRI  Reviewed Xrays  Orders:    Large joint arthrocentesis: R subacromial bursa

## 2025-06-12 RX ORDER — BUTALBITAL, ACETAMINOPHEN AND CAFFEINE 50; 325; 40 MG/1; MG/1; MG/1
1 TABLET ORAL EVERY 4 HOURS PRN
Qty: 20 TABLET | Refills: 0 | Status: SHIPPED | OUTPATIENT
Start: 2025-06-12

## 2025-06-27 DIAGNOSIS — E78.5 HYPERLIPIDEMIA, UNSPECIFIED HYPERLIPIDEMIA TYPE: ICD-10-CM

## 2025-06-30 RX ORDER — ROSUVASTATIN CALCIUM 40 MG/1
40 TABLET, COATED ORAL DAILY
Qty: 90 TABLET | Refills: 1 | Status: SHIPPED | OUTPATIENT
Start: 2025-06-30

## 2025-07-01 ENCOUNTER — OFFICE VISIT (OUTPATIENT)
Dept: FAMILY MEDICINE CLINIC | Facility: CLINIC | Age: 75
End: 2025-07-01
Payer: COMMERCIAL

## 2025-07-01 ENCOUNTER — APPOINTMENT (OUTPATIENT)
Dept: LAB | Facility: MEDICAL CENTER | Age: 75
End: 2025-07-01
Payer: COMMERCIAL

## 2025-07-01 VITALS
HEART RATE: 65 BPM | WEIGHT: 163.2 LBS | SYSTOLIC BLOOD PRESSURE: 130 MMHG | OXYGEN SATURATION: 98 % | BODY MASS INDEX: 24.81 KG/M2 | DIASTOLIC BLOOD PRESSURE: 74 MMHG

## 2025-07-01 DIAGNOSIS — R73.03 PREDIABETES: ICD-10-CM

## 2025-07-01 DIAGNOSIS — Z13.89 SCREENING FOR BLOOD OR PROTEIN IN URINE: ICD-10-CM

## 2025-07-01 DIAGNOSIS — R42 DIZZINESS: ICD-10-CM

## 2025-07-01 DIAGNOSIS — R00.2 PALPITATIONS: Primary | ICD-10-CM

## 2025-07-01 DIAGNOSIS — Z13.0 SCREENING FOR DEFICIENCY ANEMIA: ICD-10-CM

## 2025-07-01 DIAGNOSIS — I10 ESSENTIAL HYPERTENSION: ICD-10-CM

## 2025-07-01 DIAGNOSIS — H93.13 TINNITUS OF BOTH EARS: ICD-10-CM

## 2025-07-01 DIAGNOSIS — E78.5 HYPERLIPIDEMIA, UNSPECIFIED HYPERLIPIDEMIA TYPE: ICD-10-CM

## 2025-07-01 DIAGNOSIS — Z13.29 SCREENING FOR HYPOTHYROIDISM: ICD-10-CM

## 2025-07-01 DIAGNOSIS — R00.2 PALPITATIONS: ICD-10-CM

## 2025-07-01 DIAGNOSIS — F41.9 ANXIETY: ICD-10-CM

## 2025-07-01 LAB
ALBUMIN SERPL BCG-MCNC: 4.2 G/DL (ref 3.5–5)
ALP SERPL-CCNC: 49 U/L (ref 34–104)
ALT SERPL W P-5'-P-CCNC: 13 U/L (ref 7–52)
ANION GAP SERPL CALCULATED.3IONS-SCNC: 5 MMOL/L (ref 4–13)
AST SERPL W P-5'-P-CCNC: 19 U/L (ref 13–39)
BACTERIA UR QL AUTO: ABNORMAL /HPF
BASOPHILS # BLD AUTO: 0.07 THOUSANDS/ÂΜL (ref 0–0.1)
BASOPHILS NFR BLD AUTO: 1 % (ref 0–1)
BILIRUB SERPL-MCNC: 0.38 MG/DL (ref 0.2–1)
BILIRUB UR QL STRIP: NEGATIVE
BUN SERPL-MCNC: 21 MG/DL (ref 5–25)
CALCIUM SERPL-MCNC: 9.4 MG/DL (ref 8.4–10.2)
CHLORIDE SERPL-SCNC: 105 MMOL/L (ref 96–108)
CLARITY UR: CLEAR
CO2 SERPL-SCNC: 29 MMOL/L (ref 21–32)
COLOR UR: ABNORMAL
CREAT SERPL-MCNC: 0.81 MG/DL (ref 0.6–1.3)
EOSINOPHIL # BLD AUTO: 0.18 THOUSAND/ÂΜL (ref 0–0.61)
EOSINOPHIL NFR BLD AUTO: 4 % (ref 0–6)
ERYTHROCYTE [DISTWIDTH] IN BLOOD BY AUTOMATED COUNT: 13.2 % (ref 11.6–15.1)
GFR SERPL CREATININE-BSD FRML MDRD: 72 ML/MIN/1.73SQ M
GLUCOSE P FAST SERPL-MCNC: 86 MG/DL (ref 65–99)
GLUCOSE UR STRIP-MCNC: NEGATIVE MG/DL
HCT VFR BLD AUTO: 40.8 % (ref 34.8–46.1)
HGB BLD-MCNC: 13 G/DL (ref 11.5–15.4)
HGB UR QL STRIP.AUTO: NEGATIVE
IMM GRANULOCYTES # BLD AUTO: 0.01 THOUSAND/UL (ref 0–0.2)
IMM GRANULOCYTES NFR BLD AUTO: 0 % (ref 0–2)
KETONES UR STRIP-MCNC: NEGATIVE MG/DL
LEUKOCYTE ESTERASE UR QL STRIP: ABNORMAL
LYMPHOCYTES # BLD AUTO: 1.18 THOUSANDS/ÂΜL (ref 0.6–4.47)
LYMPHOCYTES NFR BLD AUTO: 24 % (ref 14–44)
MCH RBC QN AUTO: 30.2 PG (ref 26.8–34.3)
MCHC RBC AUTO-ENTMCNC: 31.9 G/DL (ref 31.4–37.4)
MCV RBC AUTO: 95 FL (ref 82–98)
MONOCYTES # BLD AUTO: 0.42 THOUSAND/ÂΜL (ref 0.17–1.22)
MONOCYTES NFR BLD AUTO: 9 % (ref 4–12)
MUCOUS THREADS UR QL AUTO: ABNORMAL
NEUTROPHILS # BLD AUTO: 3.01 THOUSANDS/ÂΜL (ref 1.85–7.62)
NEUTS SEG NFR BLD AUTO: 62 % (ref 43–75)
NITRITE UR QL STRIP: NEGATIVE
NON-SQ EPI CELLS URNS QL MICRO: ABNORMAL /HPF
NRBC BLD AUTO-RTO: 0 /100 WBCS
PH UR STRIP.AUTO: 5.5 [PH]
PLATELET # BLD AUTO: 161 THOUSANDS/UL (ref 149–390)
PMV BLD AUTO: 13 FL (ref 8.9–12.7)
POTASSIUM SERPL-SCNC: 4.3 MMOL/L (ref 3.5–5.3)
PROT SERPL-MCNC: 7.3 G/DL (ref 6.4–8.4)
PROT UR STRIP-MCNC: NEGATIVE MG/DL
RBC # BLD AUTO: 4.3 MILLION/UL (ref 3.81–5.12)
RBC #/AREA URNS AUTO: ABNORMAL /HPF
SODIUM SERPL-SCNC: 139 MMOL/L (ref 135–147)
SP GR UR STRIP.AUTO: 1.01 (ref 1–1.03)
TSH SERPL DL<=0.05 MIU/L-ACNC: 1.63 UIU/ML (ref 0.45–4.5)
UROBILINOGEN UR STRIP-ACNC: <2 MG/DL
WBC # BLD AUTO: 4.87 THOUSAND/UL (ref 4.31–10.16)
WBC #/AREA URNS AUTO: ABNORMAL /HPF

## 2025-07-01 PROCEDURE — G2211 COMPLEX E/M VISIT ADD ON: HCPCS | Performed by: INTERNAL MEDICINE

## 2025-07-01 PROCEDURE — 84443 ASSAY THYROID STIM HORMONE: CPT

## 2025-07-01 PROCEDURE — 85025 COMPLETE CBC W/AUTO DIFF WBC: CPT

## 2025-07-01 PROCEDURE — 83036 HEMOGLOBIN GLYCOSYLATED A1C: CPT

## 2025-07-01 PROCEDURE — 99214 OFFICE O/P EST MOD 30 MIN: CPT | Performed by: INTERNAL MEDICINE

## 2025-07-01 PROCEDURE — 36415 COLL VENOUS BLD VENIPUNCTURE: CPT

## 2025-07-01 PROCEDURE — 81001 URINALYSIS AUTO W/SCOPE: CPT

## 2025-07-01 PROCEDURE — 80053 COMPREHEN METABOLIC PANEL: CPT

## 2025-07-01 RX ORDER — PROPRANOLOL HYDROCHLORIDE 80 MG/1
80 CAPSULE, EXTENDED RELEASE ORAL DAILY
Qty: 90 CAPSULE | Refills: 1 | Status: SHIPPED | OUTPATIENT
Start: 2025-07-01

## 2025-07-02 LAB
EST. AVERAGE GLUCOSE BLD GHB EST-MCNC: 123 MG/DL
HBA1C MFR BLD: 5.9 %

## 2025-07-02 NOTE — ASSESSMENT & PLAN NOTE
She has chronic history of palpitations for many years, Holter monitor that was done in the past did not reveal any clinically significant arrhythmia.  She reports that symptoms are overall stable.  Patient reports lightheadedness/dizziness we will try to decrease dose of her propranolol to see if her palpitations still will be controlled and to see if it will make any difference in her current symptoms.  Orders:    propranolol (INDERAL LA) 80 mg 24 hr capsule; Take 1 capsule (80 mg total) by mouth daily    Comprehensive metabolic panel; Future    TSH, 3rd generation with Free T4 reflex; Future    CBC and differential; Future

## 2025-07-02 NOTE — ASSESSMENT & PLAN NOTE
She does have anxiety which is not very well-controlled so she would like to follow-up with behavioral therapy.  She does not think that Lexapro is making any difference so we will try to taper it down slowly and stop it.  Orders:    Ambulatory referral to Psych Services; Future

## 2025-07-02 NOTE — PROGRESS NOTES
Name: You Desir      : 1950      MRN: [id number]  Encounter Provider: Naseem Franco MD  Encounter Date: 2025   Encounter department[facility]    Assessment & Plan  Palpitations  She has chronic history of palpitations for many years, Holter monitor that was done in the past did not reveal any clinically significant arrhythmia.  She reports that symptoms are overall stable.  Patient reports lightheadedness/dizziness we will try to decrease dose of her propranolol to see if her palpitations still will be controlled and to see if it will make any difference in her current symptoms.  Orders:    propranolol (INDERAL LA) 80 mg 24 hr capsule; Take 1 capsule (80 mg total) by mouth daily    Comprehensive metabolic panel; Future    TSH, 3rd generation with Free T4 reflex; Future    CBC and differential; Future    Hyperlipidemia, unspecified hyperlipidemia type  Continue current dose of rosuvastatin.       Prediabetes  Recheck hemoglobin A1c.  Orders:    Hemoglobin A1C; Future    Essential hypertension  Blood pressure is overall within a good range, continue to monitor.       Screening for blood or protein in urine    Orders:    UA (URINE) with reflex to Scope; Future    Screening for deficiency anemia         Screening for hypothyroidism         Dizziness  Patient reports episodes of dizziness sometimes positional sometimes when she is not moving.  Reports 1 episode when she was standing in a line in the store recently, she did not lose consciousness.  Her orthostatic vitals are negative.  Negative Vera-Hallpike.  She does report  past medical history of tendinitis.  Currently etiology of her dizziness is unknown, can be vasovagal episodes/not likely that it is related to her palpitations because her symptoms are usually not related to these episodes.  We should also exclude inner ear etiology such as Ménière's disease/vestibular neuritis so she will be sent to  ENT for further testing.  We also going to try to taper down her Lexapro to see if it is playing any role in her symptoms as a side effect.  Orders:    Ambulatory Referral to Otolaryngology; Future    Ambulatory Referral to Physical Therapy; Future    Tinnitus of both ears  Follow-up with ENT.  Orders:    Ambulatory Referral to Otolaryngology; Future    Ambulatory Referral to Physical Therapy; Future    Anxiety  She does have anxiety which is not very well-controlled so she would like to follow-up with behavioral therapy.  She does not think that Lexapro is making any difference so we will try to taper it down slowly and stop it.  Orders:    Ambulatory referral to Psych Services; Future         History of Present Illness     Patient came today with complaints of dizziness that she noticed is getting worse recently.    Dizziness  Associated symptoms include fatigue. Pertinent negatives include no chest pain, coughing, headaches or weakness.   Palpitations  Associated symptoms include fatigue. Pertinent negatives include no chest pain, coughing, headaches or weakness.   Fatigue  Associated symptoms include fatigue. Pertinent negatives include no chest pain, coughing, headaches or weakness.     Review of Systems   Constitutional:  Positive for fatigue.   HENT:  Positive for tinnitus. Negative for hearing loss.    Respiratory:  Negative for cough and shortness of breath.    Cardiovascular:  Positive for palpitations. Negative for chest pain and leg swelling.   Neurological:  Positive for dizziness and light-headedness. Negative for syncope, weakness and headaches.   Psychiatric/Behavioral:  Negative for confusion.      Past Medical History[1]  Past Surgical History[2]  Family History[3]  Social History[4]  Medications[5]  No Known Allergies  Immunization History   Administered Date(s) Administered    COVID-19 PFIZER VACCINE 0.3 ML IM 03/10/2021, 04/07/2021, 12/04/2021    COVID-19 Pfizer Vac BIVALENT Jose-sucrose 12 Yr+  IM 11/17/2022    COVID-19 Pfizer vac (Jose-sucrose, gray cap) 12 yr+ IM 06/30/2022    INFLUENZA 01/01/2006    Influenza Quadrivalent Preservative Free 3 years and older IM 10/17/2014, 10/01/2015    Influenza Split High Dose Preservative Free IM 01/09/2018, 11/06/2024    Influenza, high dose seasonal 0.7 mL 10/23/2019, 10/21/2020, 10/25/2021, 10/28/2022, 10/30/2023    Influenza, seasonal, injectable 10/18/2011, 12/06/2013    Pneumococcal Conjugate 13-Valent 01/09/2018    Pneumococcal Polysaccharide PPV23 03/25/2019    Tdap 01/31/2014, 01/29/2021     Objective   /74   Pulse 65   Wt 74 kg (163 lb 3.2 oz)   SpO2 98%   BMI 24.81 kg/m²     Physical Exam  Constitutional:       General: She is not in acute distress.     Appearance: She is not toxic-appearing.     Cardiovascular:      Heart sounds: No murmur heard.     No gallop.   Pulmonary:      Effort: No respiratory distress.      Breath sounds: No wheezing or rales.     Neurological:      Cranial Nerves: No cranial nerve deficit.      Gait: Gait normal.      Comments: Negative Vera-Hallpike.   Psychiatric:         Mood and Affect: Mood normal.         Behavior: Behavior normal.              [1]   Past Medical History:  Diagnosis Date    SHAY (acute kidney injury) ([facility]) 2/3/2021    Anxiety 2018    Arthritis 2008    Back pain     Depression     GERD (gastroesophageal reflux disease) 2020    Headache(784.0) 1978    Hyperlipidemia    [2]   Past Surgical History:  Procedure Laterality Date    APPENDECTOMY      COLONOSCOPY      HYSTERECTOMY     [3]   Family History  Problem Relation Name Age of Onset    Heart failure Mother Allison     Arthritis Mother Allison     Coronary artery disease Mother Allison     Lung cancer Father Chuckie     Cancer Father Chuckie     Lupus Daughter      No Known Problems Maternal Grandmother      No Known Problems Maternal Grandfather      Cancer Paternal Grandfather          unknown type    Multiple sclerosis Brother      Bipolar disorder Son       Asthma Son      Breast cancer Maternal Aunt      No Known Problems Maternal Aunt      No Known Problems Paternal Aunt      No Known Problems Paternal Aunt      No Known Problems Paternal Aunt     [4]   Social History  Tobacco Use    Smoking status: Former     Current packs/day: 0.00     Average packs/day: 1 pack/day for 20.0 years (20.0 ttl pk-yrs)     Types: Cigarettes     Start date:      Quit date:      Years since quittin.5    Smokeless tobacco: Never   Vaping Use    Vaping status: Never Used   Substance and Sexual Activity    Alcohol use: Yes     Alcohol/week: 3.0 standard drinks of alcohol     Types: 3 Glasses of wine per week     Comment: 1/2 glass of wine at a time    Drug use: Never   [5]   Current Outpatient Medications on File Prior to Visit   Medication Sig    butalbital-acetaminophen-caffeine (FIORICET,ESGIC) -40 mg per tablet Take 1 tablet by mouth every 4 (four) hours as needed for headaches    escitalopram (LEXAPRO) 20 mg tablet TAKE 1 TABLET BY MOUTH EVERY DAY    gabapentin (NEURONTIN) 300 mg capsule Take 1 capsule (300 mg total) by mouth daily at bedtime    Multiple Vitamins-Minerals (MULTIVITAL PO) Take 1 tablet by mouth in the morning.    omeprazole (PriLOSEC) 20 mg delayed release capsule TAKE 1 CAPSULE BY MOUTH EVERY DAY    rosuvastatin (CRESTOR) 40 MG tablet TAKE 1 TABLET BY MOUTH EVERY DAY    SUMAtriptan (IMITREX) 25 mg tablet Take 1 tablet (25 mg total) by mouth once as needed for migraine

## 2025-07-15 DIAGNOSIS — G43.701 CHRONIC MIGRAINE WITHOUT AURA WITH STATUS MIGRAINOSUS, NOT INTRACTABLE: ICD-10-CM

## 2025-07-17 ENCOUNTER — TELEPHONE (OUTPATIENT)
Age: 75
End: 2025-07-17

## 2025-07-17 RX ORDER — SUMATRIPTAN SUCCINATE 25 MG/1
25 TABLET ORAL ONCE AS NEEDED
Qty: 9 TABLET | Refills: 0 | Status: SHIPPED | OUTPATIENT
Start: 2025-07-17

## 2025-07-17 NOTE — TELEPHONE ENCOUNTER
Contacted patient in regards to ROUTINE Referral, LVM to contact [phone] to discuss services needed at this time in order to be added to proper wait list.    Please add patient to proper WL(s).

## 2025-08-06 DIAGNOSIS — R00.2 PALPITATIONS: ICD-10-CM

## 2025-08-07 RX ORDER — PROPRANOLOL HYDROCHLORIDE 120 MG/1
120 CAPSULE, EXTENDED RELEASE ORAL DAILY
Qty: 30 CAPSULE | Refills: 3 | Status: SHIPPED | OUTPATIENT
Start: 2025-08-07